# Patient Record
Sex: FEMALE | Race: WHITE | NOT HISPANIC OR LATINO | Employment: OTHER | URBAN - METROPOLITAN AREA
[De-identification: names, ages, dates, MRNs, and addresses within clinical notes are randomized per-mention and may not be internally consistent; named-entity substitution may affect disease eponyms.]

---

## 2023-03-04 PROBLEM — I10 BENIGN ESSENTIAL HYPERTENSION: Status: ACTIVE | Noted: 2020-12-02

## 2023-03-04 PROBLEM — E11.9 TYPE 2 DIABETES MELLITUS (MULTI): Status: ACTIVE | Noted: 2020-12-02

## 2023-03-04 PROBLEM — H04.129 TEAR FILM INSUFFICIENCY: Status: ACTIVE | Noted: 2020-12-02

## 2023-03-04 PROBLEM — E07.89 OTHER SPECIFIED DISORDERS OF THYROID: Status: ACTIVE | Noted: 2020-12-02

## 2023-03-04 PROBLEM — N39.41 URGE INCONTINENCE OF URINE: Status: ACTIVE | Noted: 2020-12-02

## 2023-03-04 PROBLEM — E66.9 OBESITY WITH BODY MASS INDEX 30 OR GREATER: Status: ACTIVE | Noted: 2023-03-04

## 2023-03-04 PROBLEM — E78.5 HYPERLIPIDEMIA: Status: ACTIVE | Noted: 2020-12-02

## 2023-03-04 PROBLEM — E55.9 VITAMIN D DEFICIENCY: Status: ACTIVE | Noted: 2020-12-02

## 2023-03-04 PROBLEM — E53.9 VITAMIN B-COMPLEX DEFICIENCY: Status: ACTIVE | Noted: 2020-12-02

## 2023-03-04 RX ORDER — INSULIN GLARGINE 100 [IU]/ML
32 INJECTION, SOLUTION SUBCUTANEOUS NIGHTLY
COMMUNITY
End: 2023-03-17 | Stop reason: ALTCHOICE

## 2023-03-04 RX ORDER — METFORMIN HYDROCHLORIDE 500 MG/1
2 TABLET, EXTENDED RELEASE ORAL 2 TIMES DAILY
COMMUNITY
Start: 2020-12-02 | End: 2023-03-17 | Stop reason: ALTCHOICE

## 2023-03-04 RX ORDER — MAGNESIUM 250 MG
TABLET ORAL
COMMUNITY
End: 2023-11-07 | Stop reason: WASHOUT

## 2023-03-04 RX ORDER — CYCLOSPORINE 0.5 MG/ML
EMULSION OPHTHALMIC
COMMUNITY
End: 2023-09-11

## 2023-03-04 RX ORDER — LEVOTHYROXINE SODIUM 100 UG/1
1 TABLET ORAL DAILY
COMMUNITY
End: 2023-03-17 | Stop reason: ALTCHOICE

## 2023-03-04 RX ORDER — BIOTIN 10 MG
TABLET ORAL
COMMUNITY
End: 2023-03-17 | Stop reason: ALTCHOICE

## 2023-03-04 RX ORDER — PRAVASTATIN SODIUM 20 MG/1
1 TABLET ORAL NIGHTLY
COMMUNITY
Start: 2021-08-23 | End: 2023-03-17

## 2023-03-04 RX ORDER — ATENOLOL 50 MG/1
1 TABLET ORAL DAILY
COMMUNITY
Start: 2020-12-02 | End: 2023-03-17 | Stop reason: ALTCHOICE

## 2023-03-04 RX ORDER — DULAGLUTIDE 1.5 MG/.5ML
INJECTION, SOLUTION SUBCUTANEOUS
COMMUNITY
End: 2023-03-17 | Stop reason: ALTCHOICE

## 2023-03-04 RX ORDER — CHOLECALCIFEROL (VITAMIN D3) 125 MCG
CAPSULE ORAL
COMMUNITY
End: 2023-03-17 | Stop reason: ALTCHOICE

## 2023-03-04 RX ORDER — BLOOD-GLUCOSE METER
EACH MISCELLANEOUS 2 TIMES DAILY
COMMUNITY
End: 2023-04-25 | Stop reason: ALTCHOICE

## 2023-03-15 ASSESSMENT — ENCOUNTER SYMPTOMS
NECK PAIN: 1
SWEATS: 0
BLURRED VISION: 0
HYPERTENSION: 1
PND: 0
ORTHOPNEA: 0
HEADACHES: 0
PALPITATIONS: 0
SHORTNESS OF BREATH: 0

## 2023-03-17 ENCOUNTER — OFFICE VISIT (OUTPATIENT)
Dept: PRIMARY CARE | Facility: CLINIC | Age: 68
End: 2023-03-17
Payer: MEDICARE

## 2023-03-17 ENCOUNTER — TELEPHONE (OUTPATIENT)
Dept: PRIMARY CARE | Facility: CLINIC | Age: 68
End: 2023-03-17

## 2023-03-17 VITALS
WEIGHT: 166 LBS | DIASTOLIC BLOOD PRESSURE: 90 MMHG | BODY MASS INDEX: 27.66 KG/M2 | OXYGEN SATURATION: 98 % | SYSTOLIC BLOOD PRESSURE: 150 MMHG | HEART RATE: 82 BPM | HEIGHT: 65 IN

## 2023-03-17 DIAGNOSIS — Z13.6 SCREENING FOR CARDIOVASCULAR CONDITION: ICD-10-CM

## 2023-03-17 DIAGNOSIS — R68.2 DRY MOUTH: ICD-10-CM

## 2023-03-17 DIAGNOSIS — R26.89 FUNCTIONAL GAIT ABNORMALITY: ICD-10-CM

## 2023-03-17 DIAGNOSIS — I10 BENIGN ESSENTIAL HYPERTENSION: ICD-10-CM

## 2023-03-17 DIAGNOSIS — M54.50 CHRONIC BILATERAL LOW BACK PAIN WITHOUT SCIATICA: ICD-10-CM

## 2023-03-17 DIAGNOSIS — R41.89 BRAIN FOG: ICD-10-CM

## 2023-03-17 DIAGNOSIS — G89.29 CHRONIC BILATERAL LOW BACK PAIN WITHOUT SCIATICA: ICD-10-CM

## 2023-03-17 DIAGNOSIS — Z78.0 POSTMENOPAUSAL STATUS (AGE-RELATED) (NATURAL): ICD-10-CM

## 2023-03-17 DIAGNOSIS — E53.8 VITAMIN B12 DEFICIENCY: ICD-10-CM

## 2023-03-17 DIAGNOSIS — E78.5 HYPERLIPIDEMIA, UNSPECIFIED HYPERLIPIDEMIA TYPE: ICD-10-CM

## 2023-03-17 DIAGNOSIS — Z00.00 ROUTINE GENERAL MEDICAL EXAMINATION AT HEALTH CARE FACILITY: ICD-10-CM

## 2023-03-17 DIAGNOSIS — E11.9 TYPE 2 DIABETES MELLITUS WITHOUT COMPLICATION, WITH LONG-TERM CURRENT USE OF INSULIN (MULTI): ICD-10-CM

## 2023-03-17 DIAGNOSIS — Z79.4 TYPE 2 DIABETES MELLITUS WITHOUT COMPLICATION, WITH LONG-TERM CURRENT USE OF INSULIN (MULTI): ICD-10-CM

## 2023-03-17 DIAGNOSIS — Z13.820 SCREENING FOR OSTEOPOROSIS: Primary | ICD-10-CM

## 2023-03-17 PROBLEM — N39.41 URGE INCONTINENCE OF URINE: Status: RESOLVED | Noted: 2020-12-02 | Resolved: 2023-03-17

## 2023-03-17 PROBLEM — E53.9 VITAMIN B-COMPLEX DEFICIENCY: Status: RESOLVED | Noted: 2020-12-02 | Resolved: 2023-03-17

## 2023-03-17 PROBLEM — E55.9 VITAMIN D DEFICIENCY: Chronic | Status: ACTIVE | Noted: 2020-12-02

## 2023-03-17 PROBLEM — E55.9 VITAMIN D DEFICIENCY: Chronic | Status: RESOLVED | Noted: 2020-12-02 | Resolved: 2023-03-17

## 2023-03-17 PROBLEM — H04.129 TEAR FILM INSUFFICIENCY: Status: RESOLVED | Noted: 2020-12-02 | Resolved: 2023-03-17

## 2023-03-17 PROBLEM — E07.89 OTHER SPECIFIED DISORDERS OF THYROID: Chronic | Status: ACTIVE | Noted: 2020-12-02

## 2023-03-17 PROBLEM — E66.9 OBESITY WITH BODY MASS INDEX 30 OR GREATER: Chronic | Status: ACTIVE | Noted: 2023-03-04

## 2023-03-17 PROCEDURE — G0402 INITIAL PREVENTIVE EXAM: HCPCS | Performed by: FAMILY MEDICINE

## 2023-03-17 PROCEDURE — 4010F ACE/ARB THERAPY RXD/TAKEN: CPT | Performed by: FAMILY MEDICINE

## 2023-03-17 PROCEDURE — 1036F TOBACCO NON-USER: CPT | Performed by: FAMILY MEDICINE

## 2023-03-17 PROCEDURE — 99214 OFFICE O/P EST MOD 30 MIN: CPT | Performed by: FAMILY MEDICINE

## 2023-03-17 PROCEDURE — 1159F MED LIST DOCD IN RCRD: CPT | Performed by: FAMILY MEDICINE

## 2023-03-17 PROCEDURE — 1170F FXNL STATUS ASSESSED: CPT | Performed by: FAMILY MEDICINE

## 2023-03-17 PROCEDURE — 3077F SYST BP >= 140 MM HG: CPT | Performed by: FAMILY MEDICINE

## 2023-03-17 PROCEDURE — 3080F DIAST BP >= 90 MM HG: CPT | Performed by: FAMILY MEDICINE

## 2023-03-17 PROCEDURE — 1160F RVW MEDS BY RX/DR IN RCRD: CPT | Performed by: FAMILY MEDICINE

## 2023-03-17 PROCEDURE — G0403 EKG FOR INITIAL PREVENT EXAM: HCPCS | Performed by: FAMILY MEDICINE

## 2023-03-17 RX ORDER — ROSUVASTATIN CALCIUM 10 MG/1
10 TABLET, COATED ORAL NIGHTLY
COMMUNITY
End: 2023-03-17 | Stop reason: SDUPTHER

## 2023-03-17 RX ORDER — LOSARTAN POTASSIUM 100 MG/1
100 TABLET ORAL DAILY
COMMUNITY
End: 2023-03-17

## 2023-03-17 RX ORDER — ASPIRIN 81 MG/1
1 TABLET ORAL DAILY
COMMUNITY
Start: 2021-12-16 | End: 2023-11-07 | Stop reason: ALTCHOICE

## 2023-03-17 RX ORDER — LOSARTAN POTASSIUM 50 MG/1
50 TABLET ORAL DAILY
Qty: 90 TABLET | Refills: 1 | Status: SHIPPED | OUTPATIENT
Start: 2023-03-17 | End: 2023-11-07 | Stop reason: SDUPTHER

## 2023-03-17 RX ORDER — BUTALB/ACETAMINOPHEN/CAFFEINE 50-325-40
1 TABLET ORAL DAILY
COMMUNITY
End: 2023-11-07 | Stop reason: ALTCHOICE

## 2023-03-17 RX ORDER — ROSUVASTATIN CALCIUM 10 MG/1
10 TABLET, COATED ORAL NIGHTLY
Qty: 90 TABLET | Refills: 0 | Status: SHIPPED | OUTPATIENT
Start: 2023-03-17 | End: 2023-11-07 | Stop reason: SDUPTHER

## 2023-03-17 RX ORDER — LEVOTHYROXINE SODIUM 88 UG/1
88 TABLET ORAL
COMMUNITY
End: 2023-09-11

## 2023-03-17 ASSESSMENT — ENCOUNTER SYMPTOMS
CONSTIPATION: 0
RHINORRHEA: 0
DIARRHEA: 0
BLURRED VISION: 0
SWEATS: 0
TREMORS: 0
FATIGUE: 0
ABDOMINAL PAIN: 0
HYPERTENSION: 1
SHORTNESS OF BREATH: 0
COUGH: 0
DIZZINESS: 0
VOMITING: 0
ORTHOPNEA: 0
PND: 0
NECK PAIN: 1
VOICE CHANGE: 0
WEAKNESS: 1
SLEEP DISTURBANCE: 0
DYSURIA: 0
FEVER: 0
SORE THROAT: 0
NAUSEA: 0
HEADACHES: 0
PALPITATIONS: 0
APPETITE CHANGE: 0
CHILLS: 0
SPEECH DIFFICULTY: 0

## 2023-03-17 ASSESSMENT — PATIENT HEALTH QUESTIONNAIRE - PHQ9
2. FEELING DOWN, DEPRESSED OR HOPELESS: NOT AT ALL
SUM OF ALL RESPONSES TO PHQ9 QUESTIONS 1 AND 2: 0
1. LITTLE INTEREST OR PLEASURE IN DOING THINGS: NOT AT ALL

## 2023-03-17 ASSESSMENT — ACTIVITIES OF DAILY LIVING (ADL)
DRESSING: INDEPENDENT
TAKING_MEDICATION: INDEPENDENT
MANAGING_FINANCES: INDEPENDENT
BATHING: INDEPENDENT
GROCERY_SHOPPING: INDEPENDENT
DOING_HOUSEWORK: INDEPENDENT

## 2023-03-17 NOTE — ASSESSMENT & PLAN NOTE
Brain MRI normal.  Cervical MRI shows arthritis.  Check lumbar x-ray.  Recommend follow-up with neurology.  
Continue rosuvastatin 10 mg.  
Management per endocrinology.  
Management per endocrinology.  
Possible long-term COVID. Due to changes in memory and mother's history of dementia, recommend evaluation by neuropsychology.   
Sjogren's testing ordered.  Patient requesting referral to ENT.  
Start losartan 50 mg.  Discussed that would like to avoid beta-blockers and would not recommend restarting atenolol.  
No

## 2023-03-17 NOTE — PROGRESS NOTES
Subjective   Reason for Visit: Юлия Ku is an 67 y.o. female here for a Medicare Wellness visit.     Past Medical, Surgical, and Family History reviewed and updated in chart.    Reviewed all medications by prescribing practitioner or clinical pharmacist (such as prescriptions, OTCs, herbal therapies and supplements) and documented in the medical record.    Юлия returns for follow-up after seeing another PCP.    Patient has been having issues with dry mouth.  Discussed this with previous PCP who did Sjogren's testing which was negative.  Patient states mouth always feels dry.  Is eating less due to dryness in mouth.  Is not having any pain in the salivary glands.  Has seen multiple dentists, but has not received any help with this problem.    Patient is also having issues with her gait.  Has had 3 falls in the last year.  Has issues with the gait shuffling.  Did see a neurologist.  MRI of the brain and cervical spine was done.  Results showed arthritis in the cervical spine spine.    Patient has been off atenolol.  Has been taking her mom's losartan intermittently.  Did not take any medication in the past few days.  Knows that her blood pressures have been running high and other doctors offices.    Also is having issues with brain fog.  Feels like her mind is not as clear.  This has been going on since COVID.  Her mom was diagnosed with dementia in her 70s.    Hypertension  This is a recurrent problem. The current episode started more than 1 year ago. The problem has been waxing and waning since onset. The problem is uncontrolled. Associated symptoms include malaise/fatigue and neck pain. Pertinent negatives include no anxiety, blurred vision, chest pain, headaches, orthopnea, palpitations, peripheral edema, PND, shortness of breath or sweats. Agents associated with hypertension include thyroid hormones. Risk factors for coronary artery disease include diabetes mellitus. There are no compliance problems.   "      Patient Care Team:  Bibiana Dupree DO as PCP - General  Yovany Grigsby MD as Referring Physician (Neurology)  Yosef Mccormick MD as Referring Physician (Endocrinology)     Review of Systems   Constitutional:  Positive for malaise/fatigue. Negative for appetite change, chills, fatigue and fever.   HENT:  Negative for congestion, rhinorrhea, sore throat and voice change.    Eyes:  Negative for blurred vision and visual disturbance.   Respiratory:  Negative for cough and shortness of breath.    Cardiovascular:  Negative for chest pain, palpitations, orthopnea and PND.   Gastrointestinal:  Negative for abdominal pain, constipation, diarrhea, nausea and vomiting.   Genitourinary:  Negative for dysuria and urgency.   Musculoskeletal:  Positive for neck pain.   Skin:  Negative for rash.   Neurological:  Positive for weakness. Negative for dizziness, tremors, speech difficulty and headaches.   Psychiatric/Behavioral:  Negative for sleep disturbance.        Objective   Vitals:  /90   Pulse 82   Ht 1.651 m (5' 5\")   Wt 75.3 kg (166 lb)   SpO2 98%   BMI 27.62 kg/m²       Physical Exam  Constitutional:       General: She is not in acute distress.     Appearance: Normal appearance.   HENT:      Head: Normocephalic.      Nose: No congestion.      Mouth/Throat:      Mouth: Mucous membranes are moist.   Eyes:      Extraocular Movements: Extraocular movements intact.      Conjunctiva/sclera: Conjunctivae normal.   Cardiovascular:      Rate and Rhythm: Normal rate and regular rhythm.      Heart sounds: No murmur heard.  Pulmonary:      Effort: Pulmonary effort is normal.      Breath sounds: No wheezing or rhonchi.   Abdominal:      Palpations: Abdomen is soft.      Tenderness: There is no abdominal tenderness.   Musculoskeletal:         General: No swelling or tenderness.      Right lower leg: No edema.      Left lower leg: No edema.   Skin:     General: Skin is warm and dry.   Neurological:      " General: No focal deficit present.      Mental Status: She is alert.      Motor: No weakness.      Coordination: Coordination normal.      Gait: Gait normal.      Deep Tendon Reflexes: Reflexes normal.   Psychiatric:         Mood and Affect: Mood normal.         Behavior: Behavior normal.         Assessment/Plan   Problem List Items Addressed This Visit          Circulatory    Benign essential hypertension (Chronic)    Current Assessment & Plan     Start losartan 50 mg.  Discussed that would like to avoid beta-blockers and would not recommend restarting atenolol.         Relevant Medications    losartan (Cozaar) 50 mg tablet    Other Relevant Orders    Comprehensive Metabolic Panel       Endocrine/Metabolic    Type 2 diabetes mellitus (CMS/HCC) (Chronic)    Current Assessment & Plan     Management per endocrinology.            Other    Hyperlipidemia (Chronic)    Current Assessment & Plan     Continue rosuvastatin 10 mg.         Relevant Medications    rosuvastatin (Crestor) 10 mg tablet    Other Relevant Orders    Lipid Panel    Comprehensive Metabolic Panel    Dry mouth    Current Assessment & Plan     Sjogren's testing ordered.  Patient requesting referral to ENT.         Relevant Orders    Anti-SSA    Anti-SSB    Referral to ENT    Functional gait abnormality    Current Assessment & Plan     Brain MRI normal.  Cervical MRI shows arthritis.  Check lumbar x-ray.  Recommend follow-up with neurology.         Relevant Orders    XR lumbar spine 2-3 views    Brain fog    Current Assessment & Plan     Possible long-term COVID. Due to changes in memory and mother's history of dementia, recommend evaluation by neuropsychology.          Relevant Orders    Referral to Neuropsychology     Other Visit Diagnoses       Screening for osteoporosis    -  Primary    Relevant Orders    XR DEXA bone density    Postmenopausal status (age-related) (natural)        Relevant Orders    XR DEXA bone density    Routine general medical  examination at health care facility        Screening for cardiovascular condition        Relevant Orders    ECG 12 lead (Completed)    Chronic bilateral low back pain without sciatica        Relevant Orders    XR lumbar spine 2-3 views    Vitamin B12 deficiency        Relevant Orders    Vitamin B12

## 2023-03-17 NOTE — PROGRESS NOTES
Answers submitted by the patient for this visit:  High Blood Pressure Questionnaire (Submitted on 3/15/2023)  Chief Complaint: Hypertension  Chronicity: recurrent  Onset: more than 1 year ago  Progression since onset: waxing and waning  Condition status: uncontrolled  anxiety: No  blurred vision: No  chest pain: No  headaches: No  malaise/fatigue: Yes  neck pain: Yes  orthopnea: No  palpitations: No  peripheral edema: No  PND: No  shortness of breath: No  sweats: No  Agents associated with hypertension: thyroid hormones  CAD risks: diabetes mellitus  Compliance problems: no compliance problems

## 2023-03-17 NOTE — TELEPHONE ENCOUNTER
Receieved a call from Lizbeth from [facility] they received Referral for ENT Dr. Encarnacion for Dry Mouth-- they do not handle that and also the doctor states  should be handled by a dentist.

## 2023-03-20 ENCOUNTER — APPOINTMENT (OUTPATIENT)
Dept: PRIMARY CARE | Facility: CLINIC | Age: 68
End: 2023-03-20
Payer: MEDICARE

## 2023-03-21 ENCOUNTER — APPOINTMENT (OUTPATIENT)
Dept: PRIMARY CARE | Facility: CLINIC | Age: 68
End: 2023-03-21
Payer: MEDICARE

## 2023-04-03 ENCOUNTER — LAB (OUTPATIENT)
Dept: LAB | Facility: LAB | Age: 68
End: 2023-04-03
Payer: MEDICARE

## 2023-04-03 DIAGNOSIS — R68.2 DRY MOUTH: ICD-10-CM

## 2023-04-03 DIAGNOSIS — E53.8 VITAMIN B12 DEFICIENCY: ICD-10-CM

## 2023-04-03 DIAGNOSIS — I10 BENIGN ESSENTIAL HYPERTENSION: ICD-10-CM

## 2023-04-03 DIAGNOSIS — E78.5 HYPERLIPIDEMIA, UNSPECIFIED HYPERLIPIDEMIA TYPE: ICD-10-CM

## 2023-04-03 LAB
ALANINE AMINOTRANSFERASE (SGPT) (U/L) IN SER/PLAS: 11 U/L (ref 7–45)
ALBUMIN (G/DL) IN SER/PLAS: 4.6 G/DL (ref 3.4–5)
ALKALINE PHOSPHATASE (U/L) IN SER/PLAS: 43 U/L (ref 33–136)
ANION GAP IN SER/PLAS: 11 MMOL/L (ref 10–20)
ANTI-SSA: <0.2 AI
ANTI-SSB: <0.2 AI
ASPARTATE AMINOTRANSFERASE (SGOT) (U/L) IN SER/PLAS: 13 U/L (ref 9–39)
BILIRUBIN TOTAL (MG/DL) IN SER/PLAS: 0.8 MG/DL (ref 0–1.2)
CALCIUM (MG/DL) IN SER/PLAS: 9.6 MG/DL (ref 8.6–10.3)
CARBON DIOXIDE, TOTAL (MMOL/L) IN SER/PLAS: 29 MMOL/L (ref 21–32)
CHLORIDE (MMOL/L) IN SER/PLAS: 103 MMOL/L (ref 98–107)
CHOLESTEROL (MG/DL) IN SER/PLAS: 126 MG/DL (ref 0–199)
CHOLESTEROL IN HDL (MG/DL) IN SER/PLAS: 61.3 MG/DL
CHOLESTEROL/HDL RATIO: 2.1
COBALAMIN (VITAMIN B12) (PG/ML) IN SER/PLAS: 810 PG/ML (ref 211–911)
CREATININE (MG/DL) IN SER/PLAS: 0.76 MG/DL (ref 0.5–1.05)
GFR FEMALE: 86 ML/MIN/1.73M2
GLUCOSE (MG/DL) IN SER/PLAS: 102 MG/DL (ref 74–99)
LDL: 47 MG/DL (ref 0–99)
POTASSIUM (MMOL/L) IN SER/PLAS: 4.3 MMOL/L (ref 3.5–5.3)
PROTEIN TOTAL: 6.6 G/DL (ref 6.4–8.2)
SODIUM (MMOL/L) IN SER/PLAS: 139 MMOL/L (ref 136–145)
TRIGLYCERIDE (MG/DL) IN SER/PLAS: 88 MG/DL (ref 0–149)
UREA NITROGEN (MG/DL) IN SER/PLAS: 13 MG/DL (ref 6–23)
VLDL: 18 MG/DL (ref 0–40)

## 2023-04-03 PROCEDURE — 80061 LIPID PANEL: CPT

## 2023-04-03 PROCEDURE — 36415 COLL VENOUS BLD VENIPUNCTURE: CPT

## 2023-04-03 PROCEDURE — 86235 NUCLEAR ANTIGEN ANTIBODY: CPT

## 2023-04-03 PROCEDURE — 80053 COMPREHEN METABOLIC PANEL: CPT

## 2023-04-03 PROCEDURE — 82607 VITAMIN B-12: CPT

## 2023-04-25 ENCOUNTER — OFFICE VISIT (OUTPATIENT)
Dept: PRIMARY CARE | Facility: CLINIC | Age: 68
End: 2023-04-25
Payer: MEDICARE

## 2023-04-25 VITALS
OXYGEN SATURATION: 95 % | BODY MASS INDEX: 27.46 KG/M2 | HEART RATE: 99 BPM | DIASTOLIC BLOOD PRESSURE: 72 MMHG | SYSTOLIC BLOOD PRESSURE: 126 MMHG | TEMPERATURE: 97.5 F | WEIGHT: 165 LBS

## 2023-04-25 DIAGNOSIS — R68.2 DRY MOUTH: Primary | ICD-10-CM

## 2023-04-25 DIAGNOSIS — Z11.59 ENCOUNTER FOR HEPATITIS C SCREENING TEST FOR LOW RISK PATIENT: ICD-10-CM

## 2023-04-25 DIAGNOSIS — Z79.4 TYPE 2 DIABETES MELLITUS WITHOUT COMPLICATION, WITH LONG-TERM CURRENT USE OF INSULIN (MULTI): Chronic | ICD-10-CM

## 2023-04-25 DIAGNOSIS — Z11.4 SCREENING FOR HIV (HUMAN IMMUNODEFICIENCY VIRUS): ICD-10-CM

## 2023-04-25 DIAGNOSIS — E11.9 TYPE 2 DIABETES MELLITUS WITHOUT COMPLICATION, WITH LONG-TERM CURRENT USE OF INSULIN (MULTI): Chronic | ICD-10-CM

## 2023-04-25 DIAGNOSIS — I10 BENIGN ESSENTIAL HYPERTENSION: Chronic | ICD-10-CM

## 2023-04-25 DIAGNOSIS — R20.8 BURNING SENSATION OF MOUTH: ICD-10-CM

## 2023-04-25 PROCEDURE — 3074F SYST BP LT 130 MM HG: CPT | Performed by: FAMILY MEDICINE

## 2023-04-25 PROCEDURE — 1160F RVW MEDS BY RX/DR IN RCRD: CPT | Performed by: FAMILY MEDICINE

## 2023-04-25 PROCEDURE — 99214 OFFICE O/P EST MOD 30 MIN: CPT | Performed by: FAMILY MEDICINE

## 2023-04-25 PROCEDURE — 1159F MED LIST DOCD IN RCRD: CPT | Performed by: FAMILY MEDICINE

## 2023-04-25 PROCEDURE — 1036F TOBACCO NON-USER: CPT | Performed by: FAMILY MEDICINE

## 2023-04-25 PROCEDURE — 3078F DIAST BP <80 MM HG: CPT | Performed by: FAMILY MEDICINE

## 2023-04-25 PROCEDURE — 4010F ACE/ARB THERAPY RXD/TAKEN: CPT | Performed by: FAMILY MEDICINE

## 2023-04-25 RX ORDER — INSULIN GLARGINE 100 [IU]/ML
20 INJECTION, SOLUTION SUBCUTANEOUS DAILY
COMMUNITY
End: 2023-10-26 | Stop reason: WASHOUT

## 2023-04-25 RX ORDER — METFORMIN HYDROCHLORIDE 500 MG/1
500 TABLET, EXTENDED RELEASE ORAL
COMMUNITY
Start: 2022-10-04

## 2023-04-25 RX ORDER — MAGNESIUM GLUCONATE 27.5 (500)
250 TABLET ORAL DAILY
COMMUNITY
End: 2023-11-07 | Stop reason: WASHOUT

## 2023-04-25 RX ORDER — NYSTATIN 100000 [USP'U]/ML
500000 SUSPENSION ORAL 4 TIMES DAILY
Qty: 280 ML | Refills: 0 | Status: SHIPPED | OUTPATIENT
Start: 2023-04-25 | End: 2023-10-26 | Stop reason: WASHOUT

## 2023-04-25 ASSESSMENT — ENCOUNTER SYMPTOMS
COUGH: 0
VOMITING: 0
CHILLS: 0
SORE THROAT: 0
DIARRHEA: 0
NAUSEA: 0
FEVER: 0
TROUBLE SWALLOWING: 0
SHORTNESS OF BREATH: 0
DYSURIA: 0
ABDOMINAL PAIN: 0

## 2023-04-25 ASSESSMENT — PATIENT HEALTH QUESTIONNAIRE - PHQ9
SUM OF ALL RESPONSES TO PHQ9 QUESTIONS 1 AND 2: 0
1. LITTLE INTEREST OR PLEASURE IN DOING THINGS: NOT AT ALL
2. FEELING DOWN, DEPRESSED OR HOPELESS: NOT AT ALL

## 2023-04-25 NOTE — PROGRESS NOTES
Subjective   Patient ID: Юлия Ku is a 68 y.o. female who presents for Dry Mouth (X 8 months   Discuss autoimmune testing).    Юлия is here to discuss her dry mouth.  This is a constant, chronic problem.  Has been using lozenges and other dry mouth products, but she is not having any improvement.  Also has burning in mouth. Sometimes foods taste different than usual. Labs done. Here to review.     She is worried about myasthenia gravis as her father was diagnosed with it..  Reports that she does have double vision occasionally. Happens after take out contacts. Does not happen when contacts in.               Review of Systems   Constitutional:  Negative for chills and fever.   HENT:  Negative for mouth sores, sore throat and trouble swallowing.    Respiratory:  Negative for cough and shortness of breath.    Cardiovascular:  Negative for chest pain.   Gastrointestinal:  Negative for abdominal pain, diarrhea, nausea and vomiting.   Genitourinary:  Negative for dysuria.       Objective   /72   Pulse 99   Temp 36.4 °C (97.5 °F)   Wt 74.8 kg (165 lb)   SpO2 95%   BMI 27.46 kg/m²     Physical Exam  Constitutional:       General: She is not in acute distress.  HENT:      Head: Normocephalic and atraumatic.      Comments: White tongue  Eyes:      Extraocular Movements: Extraocular movements intact.   Pulmonary:      Effort: Pulmonary effort is normal.   Neurological:      Mental Status: She is alert.   Psychiatric:         Mood and Affect: Mood normal.         Assessment/Plan   Diagnoses and all orders for this visit:  Dry mouth  Comments:  Referred to rheumatology. Labs ordered to further evaluate. Possibly stomatitis will try nystatin treatment to see if improves symptoms.  Orders:  -     Referral to Rheumatology; Future  -     Acetylcholine Receptor Binding Antibody; Future  -     MONALISA; Future  -     Hepatitis C Antibody; Future  -     CBC and Auto Differential; Future  Burning sensation of mouth  -      nystatin (Mycostatin) 100,000 unit/mL suspension; Take 5 mL (500,000 Units) by mouth in the morning and 5 mL (500,000 Units) at noon and 5 mL (500,000 Units) in the evening and 5 mL (500,000 Units) before bedtime. Swish in mouth and spit out..  Screening for HIV (human immunodeficiency virus)  Encounter for hepatitis C screening test for low risk patient  -     Hepatitis C Antibody; Future  Type 2 diabetes mellitus without complication, with long-term current use of insulin (CMS/HCC)  -     CBC and Auto Differential; Future  Benign essential hypertension  -     CBC and Auto Differential; Future

## 2023-04-25 NOTE — ASSESSMENT & PLAN NOTE
Negative Sjogren's antibodies 2023. Referred to rheumatology. Labs ordered to further evaluate. Possibly stomatitis will try nystatin treatment to see if improves symptoms.

## 2023-04-27 ENCOUNTER — LAB (OUTPATIENT)
Dept: LAB | Facility: LAB | Age: 68
End: 2023-04-27
Payer: MEDICARE

## 2023-04-27 DIAGNOSIS — I10 BENIGN ESSENTIAL HYPERTENSION: Chronic | ICD-10-CM

## 2023-04-27 DIAGNOSIS — Z11.59 ENCOUNTER FOR HEPATITIS C SCREENING TEST FOR LOW RISK PATIENT: ICD-10-CM

## 2023-04-27 DIAGNOSIS — R68.2 DRY MOUTH: ICD-10-CM

## 2023-04-27 DIAGNOSIS — Z79.4 TYPE 2 DIABETES MELLITUS WITHOUT COMPLICATION, WITH LONG-TERM CURRENT USE OF INSULIN (MULTI): Chronic | ICD-10-CM

## 2023-04-27 DIAGNOSIS — E11.9 TYPE 2 DIABETES MELLITUS WITHOUT COMPLICATION, WITH LONG-TERM CURRENT USE OF INSULIN (MULTI): Chronic | ICD-10-CM

## 2023-04-27 LAB
BASOPHILS (10*3/UL) IN BLOOD BY AUTOMATED COUNT: 0.04 X10E9/L (ref 0–0.1)
BASOPHILS/100 LEUKOCYTES IN BLOOD BY AUTOMATED COUNT: 0.7 % (ref 0–2)
EOSINOPHILS (10*3/UL) IN BLOOD BY AUTOMATED COUNT: 0.07 X10E9/L (ref 0–0.7)
EOSINOPHILS/100 LEUKOCYTES IN BLOOD BY AUTOMATED COUNT: 1.3 % (ref 0–6)
ERYTHROCYTE DISTRIBUTION WIDTH (RATIO) BY AUTOMATED COUNT: 15.2 % (ref 11.5–14.5)
ERYTHROCYTE MEAN CORPUSCULAR HEMOGLOBIN CONCENTRATION (G/DL) BY AUTOMATED: 32.2 G/DL (ref 32–36)
ERYTHROCYTE MEAN CORPUSCULAR VOLUME (FL) BY AUTOMATED COUNT: 91 FL (ref 80–100)
ERYTHROCYTES (10*6/UL) IN BLOOD BY AUTOMATED COUNT: 4.67 X10E12/L (ref 4–5.2)
HEMATOCRIT (%) IN BLOOD BY AUTOMATED COUNT: 42.6 % (ref 36–46)
HEMOGLOBIN (G/DL) IN BLOOD: 13.7 G/DL (ref 12–16)
IMMATURE GRANULOCYTES/100 LEUKOCYTES IN BLOOD BY AUTOMATED COUNT: 0.2 % (ref 0–0.9)
LEUKOCYTES (10*3/UL) IN BLOOD BY AUTOMATED COUNT: 5.5 X10E9/L (ref 4.4–11.3)
LYMPHOCYTES (10*3/UL) IN BLOOD BY AUTOMATED COUNT: 1.55 X10E9/L (ref 1.2–4.8)
LYMPHOCYTES/100 LEUKOCYTES IN BLOOD BY AUTOMATED COUNT: 28.3 % (ref 13–44)
MONOCYTES (10*3/UL) IN BLOOD BY AUTOMATED COUNT: 0.4 X10E9/L (ref 0.1–1)
MONOCYTES/100 LEUKOCYTES IN BLOOD BY AUTOMATED COUNT: 7.3 % (ref 2–10)
NEUTROPHILS (10*3/UL) IN BLOOD BY AUTOMATED COUNT: 3.41 X10E9/L (ref 1.2–7.7)
NEUTROPHILS/100 LEUKOCYTES IN BLOOD BY AUTOMATED COUNT: 62.2 % (ref 40–80)
PLATELETS (10*3/UL) IN BLOOD AUTOMATED COUNT: 265 X10E9/L (ref 150–450)

## 2023-04-27 PROCEDURE — 86038 ANTINUCLEAR ANTIBODIES: CPT

## 2023-04-27 PROCEDURE — 85025 COMPLETE CBC W/AUTO DIFF WBC: CPT

## 2023-04-27 PROCEDURE — 36415 COLL VENOUS BLD VENIPUNCTURE: CPT

## 2023-04-27 PROCEDURE — 83519 RIA NONANTIBODY: CPT

## 2023-04-27 PROCEDURE — 86803 HEPATITIS C AB TEST: CPT

## 2023-04-28 LAB
ANTI-NUCLEAR ANTIBODY (ANA): NEGATIVE
HEPATITIS C VIRUS AB PRESENCE IN SERUM: NONREACTIVE

## 2023-05-01 ENCOUNTER — TELEPHONE (OUTPATIENT)
Dept: PRIMARY CARE | Facility: CLINIC | Age: 68
End: 2023-05-01

## 2023-05-01 ENCOUNTER — APPOINTMENT (OUTPATIENT)
Dept: PRIMARY CARE | Facility: CLINIC | Age: 68
End: 2023-05-01
Payer: MEDICARE

## 2023-05-01 LAB — ACHR BINDING AB, SERUM: 0 NMOL/L (ref 0–0.4)

## 2023-05-01 NOTE — TELEPHONE ENCOUNTER
----- Message from Bibiana Dupree DO sent at 5/1/2023  8:27 AM EDT -----  Please let patient know that all of her labs were normal including blood counts, screen for autoimmune disease and screening for hepatitis C. Recommend see rheumatologist as planned.

## 2023-05-02 ENCOUNTER — PATIENT MESSAGE (OUTPATIENT)
Dept: PRIMARY CARE | Facility: CLINIC | Age: 68
End: 2023-05-02
Payer: MEDICARE

## 2023-06-02 LAB
ANTICARDIOLIPIN IGA ANTIBODY: <0.5 APL U/ML (ref 0–20)
ANTICARDIOLIPIN IGG ANTIBODY: <1.6 GPL U/ML (ref 0–20)
ANTICARDIOLIPIN IGM ANTIBODY: 0.7 MPL U/ML (ref 0–20)
BETA 2 GLYCOPROTEIN 1 IGA AB IN SERUM: <0.6 U/ML (ref 0–20)
BETA 2 GLYCOPROTEIN 1 IGG AB IN SERUM: <1.4 U/ML (ref 0–20)
BETA 2 GLYCOPROTEIN 1 IGM ANTIBODY IN SERUM: 0.7 U/ML (ref 0–20)
IGG (MG/DL) IN SER/PLAS: 469 MG/DL (ref 700–1600)
IGG SUBCLASS 1 (MG/DL) IN SERUM: 372 MG/DL (ref 490–1140)
IGG SUBCLASS 2 (MG/DL) IN SERUM: 105 MG/DL (ref 150–640)
IGG SUBCLASS 3 (MG/DL) IN SERUM: 19 MG/DL (ref 11–85)
IGG SUBCLASS 4 (MG/DL) IN SERUM: 46 MG/DL (ref 3–200)
THYROPEROXIDASE AB (IU/ML) IN SER/PLAS: <28 IU/ML
TISSUE TRANSGLUTAMINASE, IGA: <1 U/ML (ref 0–14)

## 2023-06-04 LAB
ALLERGEN ANIMAL: CAT DANDER IGE (KU/L): 0.59 KU/L
ALLERGEN ANIMAL: DOG DANDER IGE (KU/L): 0.11 KU/L
ALLERGEN GRASS: BERMUDA GRASS (CYNODON DACTYLON) IGE (KU/L): <0.1 KU/L
ALLERGEN GRASS: JOHNSON GRASS (SORGHUM HALEPENSE) IGE (KU/L): <0.1 KU/L
ALLERGEN GRASS: MEADOW GRASS, KENTUCKY BLUE (POA PRATENSIS )IGE (KU/L): 0.12 KU/L
ALLERGEN GRASS: TIMOTHY GRASS (PHLEUM PRATENSE) IGE (KU/L): <0.1 KU/L
ALLERGEN INSECT: COCKROACH IGE: <0.1 KU/L
ALLERGEN MICROORGANISM: ALTERNARIA ALTERNATA IGE (KU/L): <0.1 KU/L
ALLERGEN MICROORGANISM: ASPERGILLUS FUMIGATUS IGE (KU/L): <0.1 KU/L
ALLERGEN MICROORGANISM: CLADOSPORIUM HERBARUM IGE (KU/L): <0.1 KU/L
ALLERGEN MICROORGANISM: PENICILLIUM CHRYSOGENUM (P. NOTATUM) IGE (KU/L): <0.1 KU/L
ALLERGEN MITE: DERMATOPHAGOIDES FARINAE (HOUSE DUST MITE) IGE (KU/L): 1.5 KU/L
ALLERGEN MITE: DERMATOPHAGOIDES PTERONYSSINUS (HOUSE DUST MITE) IGE (KU/L): 0.92 KU/L
ALLERGEN TREE: BOX-ELDER (ACER NEGUNDO) IGE (KU/L): 0.11 KU/L
ALLERGEN TREE: COMMON SILVER BIRCH (BETULA VERRUCOSA) IGE (KU/L): <0.1 KU/L
ALLERGEN TREE: COTTONWOOD (POPULUS DELTOIDES) IGE (KU/L): <0.1 KU/L
ALLERGEN TREE: ELM (ULMUS AMERICANA) IGE (KU/L): <0.1 KU/L
ALLERGEN TREE: MAPLE LEAF SYCAMORE, LONDON PLANE IGE (KU/L): <0.1 KU/L
ALLERGEN TREE: MOUNTAIN JUNIPER (JUNIPERUS SABINOIDES) IGE (KU/L): <0.1 KU/L
ALLERGEN TREE: MULBERRY (MORUS ALBA) IGE (KU/L): <0.1 KU/L
ALLERGEN TREE: OAK (QUERCUS ALBA) IGE (KU/L): <0.1 KU/L
ALLERGEN TREE: PECAN, HICKORY (CARYA PECAN) IGE (KU/L): <0.1 KU/L
ALLERGEN TREE: WALNUT IGE: <0.1 KU/L
ALLERGEN TREE: WHITE ASH (FRAXINUS AMERICANA) IGE (KU/L): 0.13 KU/L
ALLERGEN WEED: COMMON PIGWEED (AMARANTHUS RETROFLEXUS) IGE (KU/L): <0.1 KU/L
ALLERGEN WEED: COMMON RAGWEED (AMB. ARTEMISIIFOLIA/A. ELATIOR) IGE (KU/L): <0.1 KU/L
ALLERGEN WEED: GOOSEFOOT, LAMB'S QUARTERS (CHENOPODIUM ALBUM) IGE (KU/L): <0.1 KU/L
ALLERGEN WEED: PLANTAIN (ENGLISH), RIBWORT (PLANTAGO LANCEOLATA) IGE (KU/L): <0.1 KU/L
ALLERGEN WEED: PRICKLY SALTWORT/RUSSIAN THISTLE (SALSOLA KALI) IGE (KU/L): <0.1 KU/L
ALLERGEN WEED: SHEEP SORREL (RUMEX ACETOSELLA) IGE (KU/L): <0.1 KU/L
IMMUNOCAP IGE: 44.4 KU/L (ref 0–214)
IMMUNOCAP INTERPRETATION: ABNORMAL

## 2023-06-05 ENCOUNTER — PATIENT MESSAGE (OUTPATIENT)
Dept: PRIMARY CARE | Facility: CLINIC | Age: 68
End: 2023-06-05
Payer: MEDICARE

## 2023-06-05 DIAGNOSIS — E07.89 OTHER SPECIFIED DISORDERS OF THYROID: Primary | Chronic | ICD-10-CM

## 2023-06-06 LAB
THYROGLOBULIN AB (IU/ML) IN SER/PLAS: <0.9 IU/ML (ref 0–4)
THYROGLOBULIN LC-MS/MS: NORMAL NG/ML (ref 1.3–31.8)
THYROGLOBULIN: 1.3 NG/ML (ref 1.3–31.8)

## 2023-06-09 LAB
CRYOGLOBULIN, QUALITATIVE: NORMAL
TRYPTASE: 2.4 MCG/L

## 2023-07-11 ASSESSMENT — ENCOUNTER SYMPTOMS
VISUAL CHANGE: 0
SLURRED SPEECH: 0
LOSS OF BALANCE: 1
FOCAL SENSORY LOSS: 0
HEADACHES: 0
VERTIGO: 0
PALPITATIONS: 0
MEMORY LOSS: 0
ABDOMINAL PAIN: 0
BOWEL INCONTINENCE: 0
AURA: 0
DIAPHORESIS: 0
DIZZINESS: 1
LIGHT-HEADEDNESS: 1
VOMITING: 0
FOCAL WEAKNESS: 1
ALTERED MENTAL STATUS: 0
SHORTNESS OF BREATH: 0
CONFUSION: 0
FATIGUE: 1
BACK PAIN: 0
NECK PAIN: 0
WEAKNESS: 1
FEVER: 0
NAUSEA: 0
NEUROLOGIC COMPLAINT: 1
CLUMSINESS: 1
NEAR-SYNCOPE: 0

## 2023-07-15 LAB — B. BURGDORFERI VLSE1/PEPC10 ABS, ELISA: 0.32 IV

## 2023-07-18 ENCOUNTER — OFFICE VISIT (OUTPATIENT)
Dept: PRIMARY CARE | Facility: CLINIC | Age: 68
End: 2023-07-18
Payer: MEDICARE

## 2023-07-18 VITALS
OXYGEN SATURATION: 96 % | DIASTOLIC BLOOD PRESSURE: 74 MMHG | BODY MASS INDEX: 27.46 KG/M2 | HEART RATE: 63 BPM | TEMPERATURE: 97 F | WEIGHT: 165 LBS | SYSTOLIC BLOOD PRESSURE: 122 MMHG

## 2023-07-18 DIAGNOSIS — Z79.4 TYPE 2 DIABETES MELLITUS WITHOUT COMPLICATION, WITH LONG-TERM CURRENT USE OF INSULIN (MULTI): Chronic | ICD-10-CM

## 2023-07-18 DIAGNOSIS — E78.5 HYPERLIPIDEMIA, UNSPECIFIED HYPERLIPIDEMIA TYPE: Chronic | ICD-10-CM

## 2023-07-18 DIAGNOSIS — E11.9 TYPE 2 DIABETES MELLITUS WITHOUT COMPLICATION, WITH LONG-TERM CURRENT USE OF INSULIN (MULTI): Chronic | ICD-10-CM

## 2023-07-18 DIAGNOSIS — R68.2 DRY MOUTH: ICD-10-CM

## 2023-07-18 DIAGNOSIS — R26.89 FUNCTIONAL GAIT ABNORMALITY: ICD-10-CM

## 2023-07-18 DIAGNOSIS — E89.0 POSTABLATIVE HYPOTHYROIDISM: Chronic | ICD-10-CM

## 2023-07-18 DIAGNOSIS — I10 BENIGN ESSENTIAL HYPERTENSION: Chronic | ICD-10-CM

## 2023-07-18 DIAGNOSIS — R41.89 BRAIN FOG: Primary | ICD-10-CM

## 2023-07-18 PROBLEM — E66.9 OBESITY WITH BODY MASS INDEX 30 OR GREATER: Chronic | Status: RESOLVED | Noted: 2023-03-04 | Resolved: 2023-07-18

## 2023-07-18 PROBLEM — I25.10 ARTERIOSCLEROSIS OF CORONARY ARTERY: Status: ACTIVE | Noted: 2022-06-27

## 2023-07-18 PROCEDURE — 1036F TOBACCO NON-USER: CPT | Performed by: FAMILY MEDICINE

## 2023-07-18 PROCEDURE — 4010F ACE/ARB THERAPY RXD/TAKEN: CPT | Performed by: FAMILY MEDICINE

## 2023-07-18 PROCEDURE — 1159F MED LIST DOCD IN RCRD: CPT | Performed by: FAMILY MEDICINE

## 2023-07-18 PROCEDURE — 1160F RVW MEDS BY RX/DR IN RCRD: CPT | Performed by: FAMILY MEDICINE

## 2023-07-18 PROCEDURE — 99214 OFFICE O/P EST MOD 30 MIN: CPT | Performed by: FAMILY MEDICINE

## 2023-07-18 PROCEDURE — 3074F SYST BP LT 130 MM HG: CPT | Performed by: FAMILY MEDICINE

## 2023-07-18 PROCEDURE — 3078F DIAST BP <80 MM HG: CPT | Performed by: FAMILY MEDICINE

## 2023-07-18 RX ORDER — MONTELUKAST SODIUM 10 MG/1
1 TABLET ORAL NIGHTLY
Qty: 30 TABLET | Refills: 5 | COMMUNITY
Start: 2023-06-01 | End: 2023-10-26 | Stop reason: WASHOUT

## 2023-07-18 ASSESSMENT — ENCOUNTER SYMPTOMS
PALPITATIONS: 0
NECK PAIN: 0
FOCAL WEAKNESS: 1
BOWEL INCONTINENCE: 0
VERTIGO: 0
NEAR-SYNCOPE: 0
LOSS OF BALANCE: 1
MEMORY LOSS: 0
FATIGUE: 1
WEAKNESS: 1
HEADACHES: 0
NEUROLOGIC COMPLAINT: 1
VISUAL CHANGE: 0
VOMITING: 0
CONFUSION: 0
CLUMSINESS: 1
ALTERED MENTAL STATUS: 0
ABDOMINAL PAIN: 0
FEVER: 0
SHORTNESS OF BREATH: 0
DIAPHORESIS: 0
FOCAL SENSORY LOSS: 0
SLURRED SPEECH: 0
DIZZINESS: 1
NAUSEA: 0
BACK PAIN: 0
AURA: 0
LIGHT-HEADEDNESS: 1

## 2023-07-18 NOTE — PROGRESS NOTES
Subjective   Patient ID: Юлия Ku is a 68 y.o. female who presents for Hyperlipidemia, Hypertension, and Diabetes (Recheck. Review labs.).    Юлия presents to discuss multiple issues.     Her main concerns are brain fog and weakness.     Did see endocrinologist. Was told to take 88 mcg levothyroxine consistently then recheck labs. Patient has not been taking the levothyroine 88 mcg because she states that she does not feel right taking it. Has been taking old rx instead. Is concerned that her brain fog could be due to long-covid.    Saw neurology regarding weakness. Plan to continue PT.     Went to ENT regarding dry mouth. Labs negative. Referred to oral surgeon to discuss mouth biopsy, but patient did not schedule because lost phone number.     Acute Neurological Problem  The patient's primary symptoms include clumsiness, focal weakness, a loss of balance and weakness. The patient's pertinent negatives include no altered mental status, focal sensory loss, memory loss, near-syncope, slurred speech, syncope or visual change. This is a chronic problem. The current episode started more than 1 year ago. The neurological problem developed gradually. The problem has been waxing and waning since onset. There was lower extremity focality noted. Associated symptoms include dizziness, fatigue and light-headedness. Pertinent negatives include no abdominal pain, auditory change, aura, back pain, bladder incontinence, bowel incontinence, chest pain, confusion, diaphoresis, fever, headaches, nausea, neck pain, palpitations, shortness of breath, vertigo or vomiting. Past treatments include bed rest and walking. The treatment provided no relief.        Review of Systems   Constitutional:  Positive for fatigue. Negative for diaphoresis and fever.   Respiratory:  Negative for shortness of breath.    Cardiovascular:  Negative for chest pain, palpitations and near-syncope.   Gastrointestinal:  Negative for abdominal pain, bowel  incontinence, nausea and vomiting.   Genitourinary:  Negative for bladder incontinence.   Musculoskeletal:  Negative for back pain and neck pain.   Neurological:  Positive for dizziness, focal weakness, weakness, light-headedness and loss of balance. Negative for vertigo, syncope and headaches.   Psychiatric/Behavioral:  Negative for confusion and memory loss.        Objective   /74   Pulse 63   Temp 36.1 °C (97 °F)   Wt 74.8 kg (165 lb)   SpO2 96%   BMI 27.46 kg/m²     Physical Exam  Constitutional:       General: She is not in acute distress.     Appearance: Normal appearance.   HENT:      Head: Normocephalic.      Mouth/Throat:      Mouth: Mucous membranes are moist.   Eyes:      Extraocular Movements: Extraocular movements intact.      Conjunctiva/sclera: Conjunctivae normal.   Cardiovascular:      Rate and Rhythm: Normal rate and regular rhythm.      Heart sounds: No murmur heard.  Pulmonary:      Breath sounds: No wheezing or rhonchi.   Musculoskeletal:      Cervical back: Neck supple.   Skin:     General: Skin is warm and dry.   Neurological:      Mental Status: She is alert.   Psychiatric:         Attention and Perception: Attention and perception normal.         Mood and Affect: Mood normal.         Speech: Speech is delayed.         Behavior: Behavior normal. Behavior is cooperative.         Cognition and Memory: Cognition normal.         Assessment/Plan   Problem List Items Addressed This Visit       Type 2 diabetes mellitus (CMS/HCC) (Chronic)     Mgmt per endocrinology.          Benign essential hypertension (Chronic)    Hyperlipidemia (Chronic)     Continue rosuvastatin.          Postablative hypothyroidism (Chronic)     Recommend start taking medication as prescribed by endocrinologist.          Dry mouth     Negative Sjogren's antibodies 2023. Seen by ENT.ENT referred to oral surgeon to discuss mouth biopsy.         Functional gait abnormality     Continue PT.         Relevant Orders     Disability Placard    Brain fog - Primary     Suspect due to uncontrolled thyroid disease as recent TSH 39. Recommend start taking the 88mcg of levothyroxine that was ordered by endocrinologist. Patient to reach out to specialist regarding repeat labs.         Relevant Orders    Referral to [facility]

## 2023-07-18 NOTE — PATIENT INSTRUCTIONS
- Bring your  to your next appointment.   - Call Dr. Ingram regarding labs and medication for your thyroid and diabetes.

## 2023-07-19 NOTE — ASSESSMENT & PLAN NOTE
Suspect due to uncontrolled thyroid disease as recent TSH 39. Recommend start taking the 88mcg of levothyroxine that was ordered by endocrinologist. Patient to reach out to specialist regarding repeat labs.

## 2023-07-19 NOTE — ASSESSMENT & PLAN NOTE
Negative Sjogren's antibodies 2023. Seen by ENT.ENT referred to oral surgeon to discuss mouth biopsy.

## 2023-07-25 LAB
RHEUMATOID FACTOR (IU/ML) IN SERUM OR PLASMA: <10 IU/ML (ref 0–15)
SEDIMENTATION RATE, ERYTHROCYTE: 5 MM/H (ref 0–30)
THYROTROPIN (MIU/L) IN SER/PLAS BY DETECTION LIMIT <= 0.05 MIU/L: 37.75 MIU/L (ref 0.44–3.98)
THYROXINE (T4) FREE (NG/DL) IN SER/PLAS: 0.8 NG/DL (ref 0.61–1.12)

## 2023-07-26 LAB — ANTI-NUCLEAR ANTIBODY (ANA): NEGATIVE

## 2023-07-29 LAB
VITAMIN A (RETINOL): 0.67 MG/L (ref 0.3–1.2)
VITAMIN A (RETINYL PALMITATE): 0.04 MG/L (ref 0–0.1)
VITAMIN A, INTERPRETATION: NORMAL

## 2023-08-14 ENCOUNTER — PATIENT MESSAGE (OUTPATIENT)
Dept: PRIMARY CARE | Facility: CLINIC | Age: 68
End: 2023-08-14
Payer: MEDICARE

## 2023-08-14 DIAGNOSIS — M62.81 MUSCLE WEAKNESS (GENERALIZED): ICD-10-CM

## 2023-08-14 DIAGNOSIS — Z86.16 HISTORY OF COVID-19: Primary | ICD-10-CM

## 2023-08-16 ENCOUNTER — TELEPHONE (OUTPATIENT)
Dept: PRIMARY CARE | Facility: CLINIC | Age: 68
End: 2023-08-16
Payer: MEDICARE

## 2023-08-16 NOTE — TELEPHONE ENCOUNTER
"Called pt and informed her that if \"[place]\" is a COVID Clinic than referral should work... it does not specify a location on referral. I informed the patient if she has any issues to let us know.   "

## 2023-08-16 NOTE — TELEPHONE ENCOUNTER
----- Message from Bibiana Dupree DO sent at 8/16/2023 10:37 AM EDT -----  Regarding: FW: post covid vaccine number 3  Contact: [phone]  Please contact patient to get information for requested referral  ----- Message -----  From: Katie Lam RN  Sent: 8/16/2023  10:21 AM EDT  To: Bibiana Dupree DO  Subject: FW: post covid vaccine number 3                    ----- Message -----  From: Юлия Ku  Sent: 8/16/2023   9:43 AM EDT  To: #  Subject: post covid vaccine number 3                      could you place the referral to the [place] one?  THe one here is Oct. 26th!!

## 2023-09-01 LAB — B. BURGDORFERI VLSE1/PEPC10 ABS, ELISA: 0.36 IV

## 2023-09-04 LAB
DIPHTHERIA ANTIBODY: 0.6 IU/ML
PNEUMO SEROTYPE INTERPRETATION: NORMAL
SEROTYPE 1, VIRC: 0.18 UG/ML
SEROTYPE 12F, VIRC: <0.04 UG/ML
SEROTYPE 14, VIRC: 0.79 UG/ML
SEROTYPE 18C(56), VIRC: <0.05 UG/ML
SEROTYPE 19F, VIRC: 2.98 UG/ML
SEROTYPE 23F, VIRC: 0.1 UG/ML
SEROTYPE 3, VIRC: 0.16 UG/ML
SEROTYPE 4, VIRC: 0.09 UG/ML
SEROTYPE 5, VIRC: 0.06 UG/ML
SEROTYPE 6B(26), VIRC: 0.04 UG/ML
SEROTYPE 7F(51), VIRC: 0.29 UG/ML
SEROTYPE 8, VIRC: <0.03 UG/ML
SEROTYPE 9N, VIRC: 0.2 UG/ML
SEROTYPE 9V(68), VIRC: 0.06 UG/ML
TETANUS AB IGG: 0.7 IU/ML

## 2023-09-07 LAB — THYROTROPIN (MIU/L) IN SER/PLAS BY DETECTION LIMIT <= 0.05 MIU/L: 0.75 MIU/L (ref 0.44–3.98)

## 2023-09-11 ENCOUNTER — OFFICE VISIT (OUTPATIENT)
Dept: PRIMARY CARE | Facility: CLINIC | Age: 68
End: 2023-09-11
Payer: MEDICARE

## 2023-09-11 VITALS
HEART RATE: 91 BPM | WEIGHT: 161 LBS | OXYGEN SATURATION: 96 % | SYSTOLIC BLOOD PRESSURE: 144 MMHG | BODY MASS INDEX: 26.79 KG/M2 | TEMPERATURE: 97.1 F | DIASTOLIC BLOOD PRESSURE: 92 MMHG

## 2023-09-11 DIAGNOSIS — R41.89 COGNITIVE CHANGES: Primary | ICD-10-CM

## 2023-09-11 DIAGNOSIS — E03.9 HYPOTHYROIDISM, UNSPECIFIED TYPE: ICD-10-CM

## 2023-09-11 DIAGNOSIS — R41.89 BRAIN FOG: ICD-10-CM

## 2023-09-11 DIAGNOSIS — R26.9 GAIT ABNORMALITY: ICD-10-CM

## 2023-09-11 DIAGNOSIS — L65.9 HAIR LOSS: ICD-10-CM

## 2023-09-11 DIAGNOSIS — R68.2 DRY MOUTH: ICD-10-CM

## 2023-09-11 PROCEDURE — 4010F ACE/ARB THERAPY RXD/TAKEN: CPT | Performed by: FAMILY MEDICINE

## 2023-09-11 PROCEDURE — 1036F TOBACCO NON-USER: CPT | Performed by: FAMILY MEDICINE

## 2023-09-11 PROCEDURE — 1160F RVW MEDS BY RX/DR IN RCRD: CPT | Performed by: FAMILY MEDICINE

## 2023-09-11 PROCEDURE — 3077F SYST BP >= 140 MM HG: CPT | Performed by: FAMILY MEDICINE

## 2023-09-11 PROCEDURE — 99215 OFFICE O/P EST HI 40 MIN: CPT | Performed by: FAMILY MEDICINE

## 2023-09-11 PROCEDURE — 1159F MED LIST DOCD IN RCRD: CPT | Performed by: FAMILY MEDICINE

## 2023-09-11 PROCEDURE — 3080F DIAST BP >= 90 MM HG: CPT | Performed by: FAMILY MEDICINE

## 2023-09-11 RX ORDER — LEVOTHYROXINE SODIUM 100 UG/1
100 TABLET ORAL DAILY
Qty: 90 TABLET | Refills: 1 | Status: SHIPPED | OUTPATIENT
Start: 2023-09-11 | End: 2024-04-11 | Stop reason: SDUPTHER

## 2023-09-11 RX ORDER — MIDODRINE HYDROCHLORIDE 5 MG/1
5 TABLET ORAL 3 TIMES DAILY
COMMUNITY
Start: 2023-08-30 | End: 2023-10-26 | Stop reason: WASHOUT

## 2023-09-11 ASSESSMENT — ENCOUNTER SYMPTOMS
CHILLS: 0
WEAKNESS: 1
FATIGUE: 1
FEVER: 0

## 2023-09-11 NOTE — LETTER
September 15, 2023     Юлия Ku  [address] [place] [address]    Patient: Юлия Ku   YOB: 1955   Date of Visit: 9/11/2023       Dear Dr. Obrien    I recently saw our mutual patient Юлия Ku. Over the last year she has had significant decline in both her cognition function and changes in her gait. Her neurologic work-up     Sincerely,     Bibiana Dupree, DO      ______________________________________________________________________________________    Subjective  Patient ID: Юлия Ku is a 68 y.o. female who presents for cognitive function (Discuss MOCA test).    Юлия presents with her sister for follow-up.    She continues to notice brain fog. Sister has noticed that her memory has declined. Scheduled with [facility] on 9/26. MRI done. Labs done. Here for further testing today.     Also has noticed hair loss. Located throughout scalp.          Review of Systems   Constitutional:  Positive for fatigue. Negative for chills and fever.   Musculoskeletal:  Positive for gait problem.   Neurological:  Positive for weakness.       Objective  BP (!) 144/92   Pulse 91   Temp 36.2 °C (97.1 °F)   Wt 73 kg (161 lb)   SpO2 96%   BMI 26.79 kg/m²     Physical Exam  Constitutional:       General: She is not in acute distress.     Appearance: Normal appearance.   HENT:      Head: Normocephalic.   Pulmonary:      Effort: Pulmonary effort is normal.   Neurological:      General: No focal deficit present.      Mental Status: She is alert.   Psychiatric:         Mood and Affect: Mood normal.         Assessment/Plan  Problem List Items Addressed This Visit       Cognitive changes - Primary     MOCA normal 26/30 on 9/11/23         Relevant Orders    Referral to Neuropsychology    Hypothyroidism     TSH at goal          Relevant Medications    levothyroxine (Synthroid, Levoxyl) 100 mcg tablet    Gait abnormality     Work-up negative including lumbar MRI, brain MRI, vitamin  B12 testing. Check for rare deficiencies that could be contributing to gait changes. Consult with  rheumatology to see if Sjogren's could be impacting gait.          Relevant Orders    Folate    Vitamin E    Copper, serum    Hair loss     Check iron panel.          Relevant Orders    CBC and Auto Differential    Iron and TIBC    Ferritin            Time Based Billing:  - Prep Time on Date of Patient Encounter:  5 minutes  - Time Directly with Patient/Family/Caregiver:   42 minutes  - Documentation Time:   5 minutes    Total Minutes:  52

## 2023-09-11 NOTE — ASSESSMENT & PLAN NOTE
Work-up negative including lumbar MRI, brain MRI, vitamin B12 testing. Check for rare deficiencies that could be contributing to gait changes. Consult with  rheumatology to see if Sjogren's could be impacting gait.

## 2023-09-11 NOTE — PROGRESS NOTES
Subjective   Patient ID: Юлия Ku is a 68 y.o. female who presents for cognitive function (Discuss MOCA test).    Юлия presents with her sister for follow-up.    She continues to notice brain fog. Sister has noticed that her memory has declined. Scheduled with [facility] on 9/26. MRI done. Labs done. Here for further testing today.     Also has noticed hair loss. Located throughout scalp.          Review of Systems   Constitutional:  Positive for fatigue. Negative for chills and fever.   Musculoskeletal:  Positive for gait problem.   Neurological:  Positive for weakness.       Objective   BP (!) 144/92   Pulse 91   Temp 36.2 °C (97.1 °F)   Wt 73 kg (161 lb)   SpO2 96%   BMI 26.79 kg/m²     Physical Exam  Constitutional:       General: She is not in acute distress.     Appearance: Normal appearance.   HENT:      Head: Normocephalic.   Pulmonary:      Effort: Pulmonary effort is normal.   Neurological:      General: No focal deficit present.      Mental Status: She is alert.   Psychiatric:         Mood and Affect: Mood normal.         Assessment/Plan   Problem List Items Addressed This Visit       Cognitive changes - Primary     MOCA normal 26/30 on 9/11/23         Relevant Orders    Referral to Neuropsychology    Hypothyroidism     TSH at goal          Relevant Medications    levothyroxine (Synthroid, Levoxyl) 100 mcg tablet    Gait abnormality     Work-up negative including lumbar MRI, brain MRI, vitamin B12 testing. Check for rare deficiencies that could be contributing to gait changes. Consult with  rheumatology to see if Sjogren's could be impacting gait.          Relevant Orders    Folate    Vitamin E    Copper, serum    Hair loss     Check iron panel.          Relevant Orders    CBC and Auto Differential    Iron and TIBC    Ferritin            Time Based Billing:  - Prep Time on Date of Patient Encounter:  5 minutes  - Time Directly with Patient/Family/Caregiver:   42 minutes  -  Documentation Time:   5 minutes    Total Minutes:  52

## 2023-09-11 NOTE — PATIENT INSTRUCTIONS
When you see Dr. Obrien:  - ask if he would agree with oral biopsy  - ask if he thnks that your brain fog and gait issues could be related to Sjogren's    Call 's office to let them know that your BP was 144/92 in PCP office. Does she want to change your midodrine?

## 2023-09-12 ENCOUNTER — LAB (OUTPATIENT)
Dept: LAB | Facility: LAB | Age: 68
End: 2023-09-12
Payer: MEDICARE

## 2023-09-12 DIAGNOSIS — L65.9 HAIR LOSS: ICD-10-CM

## 2023-09-12 DIAGNOSIS — R26.9 GAIT ABNORMALITY: ICD-10-CM

## 2023-09-12 LAB
BASOPHILS (10*3/UL) IN BLOOD BY AUTOMATED COUNT: 0.05 X10E9/L (ref 0–0.1)
BASOPHILS/100 LEUKOCYTES IN BLOOD BY AUTOMATED COUNT: 0.9 % (ref 0–2)
EOSINOPHILS (10*3/UL) IN BLOOD BY AUTOMATED COUNT: 0.06 X10E9/L (ref 0–0.7)
EOSINOPHILS/100 LEUKOCYTES IN BLOOD BY AUTOMATED COUNT: 1 % (ref 0–6)
ERYTHROCYTE DISTRIBUTION WIDTH (RATIO) BY AUTOMATED COUNT: 13.4 % (ref 11.5–14.5)
ERYTHROCYTE MEAN CORPUSCULAR HEMOGLOBIN CONCENTRATION (G/DL) BY AUTOMATED: 31.9 G/DL (ref 32–36)
ERYTHROCYTE MEAN CORPUSCULAR VOLUME (FL) BY AUTOMATED COUNT: 93 FL (ref 80–100)
ERYTHROCYTES (10*6/UL) IN BLOOD BY AUTOMATED COUNT: 4.56 X10E12/L (ref 4–5.2)
FERRITIN (UG/LL) IN SER/PLAS: 19 UG/L (ref 8–150)
FOLATE (NG/ML) IN SER/PLAS: 17.8 NG/ML
HEMATOCRIT (%) IN BLOOD BY AUTOMATED COUNT: 42.3 % (ref 36–46)
HEMOGLOBIN (G/DL) IN BLOOD: 13.5 G/DL (ref 12–16)
IMMATURE GRANULOCYTES/100 LEUKOCYTES IN BLOOD BY AUTOMATED COUNT: 0.2 % (ref 0–0.9)
IRON (UG/DL) IN SER/PLAS: 41 UG/DL (ref 35–150)
IRON BINDING CAPACITY (UG/DL) IN SER/PLAS: 443 UG/DL (ref 240–445)
IRON SATURATION (%) IN SER/PLAS: 9 % (ref 25–45)
LEUKOCYTES (10*3/UL) IN BLOOD BY AUTOMATED COUNT: 5.8 X10E9/L (ref 4.4–11.3)
LYMPHOCYTES (10*3/UL) IN BLOOD BY AUTOMATED COUNT: 1.69 X10E9/L (ref 1.2–4.8)
LYMPHOCYTES/100 LEUKOCYTES IN BLOOD BY AUTOMATED COUNT: 29.2 % (ref 13–44)
MONOCYTES (10*3/UL) IN BLOOD BY AUTOMATED COUNT: 0.6 X10E9/L (ref 0.1–1)
MONOCYTES/100 LEUKOCYTES IN BLOOD BY AUTOMATED COUNT: 10.4 % (ref 2–10)
NEUTROPHILS (10*3/UL) IN BLOOD BY AUTOMATED COUNT: 3.37 X10E9/L (ref 1.2–7.7)
NEUTROPHILS/100 LEUKOCYTES IN BLOOD BY AUTOMATED COUNT: 58.3 % (ref 40–80)
PLATELETS (10*3/UL) IN BLOOD AUTOMATED COUNT: 320 X10E9/L (ref 150–450)

## 2023-09-12 PROCEDURE — 83540 ASSAY OF IRON: CPT

## 2023-09-12 PROCEDURE — 82746 ASSAY OF FOLIC ACID SERUM: CPT

## 2023-09-12 PROCEDURE — 84446 ASSAY OF VITAMIN E: CPT

## 2023-09-12 PROCEDURE — 83550 IRON BINDING TEST: CPT

## 2023-09-12 PROCEDURE — 36415 COLL VENOUS BLD VENIPUNCTURE: CPT

## 2023-09-12 PROCEDURE — 82728 ASSAY OF FERRITIN: CPT

## 2023-09-12 PROCEDURE — 85025 COMPLETE CBC W/AUTO DIFF WBC: CPT

## 2023-09-12 PROCEDURE — 82525 ASSAY OF COPPER: CPT

## 2023-09-15 LAB
COPPER: 101 UG/DL (ref 80–155)
VITAMIN E (ALPHA-TOCOPHEROL): 11.2 MG/L (ref 5.5–18)
VITAMIN E (GAMMA-TOCOPHEROL): 0.6 MG/L (ref 0–6)

## 2023-09-18 ENCOUNTER — APPOINTMENT (OUTPATIENT)
Dept: PRIMARY CARE | Facility: CLINIC | Age: 68
End: 2023-09-18
Payer: MEDICARE

## 2023-10-12 ASSESSMENT — ANXIETY QUESTIONNAIRES
3. WORRYING TOO MUCH ABOUT DIFFERENT THINGS: NOT AT ALL
4. TROUBLE RELAXING: NOT AT ALL
1. FEELING NERVOUS, ANXIOUS, OR ON EDGE: NOT AT ALL
2. NOT BEING ABLE TO STOP OR CONTROL WORRYING: NOT AT ALL
6. BECOMING EASILY ANNOYED OR IRRITABLE: NOT AT ALL
7. FEELING AFRAID AS IF SOMETHING AWFUL MIGHT HAPPEN: NOT AT ALL
GAD7 TOTAL SCORE: 0
5. BEING SO RESTLESS THAT IT IS HARD TO SIT STILL: NOT AT ALL

## 2023-10-12 ASSESSMENT — PATIENT HEALTH QUESTIONNAIRE - PHQ9
3. TROUBLE FALLING OR STAYING ASLEEP OR SLEEPING TOO MUCH: SEVERAL DAYS
5. POOR APPETITE OR OVEREATING: NOT AT ALL
7. TROUBLE CONCENTRATING ON THINGS, SUCH AS READING THE NEWSPAPER OR WATCHING TELEVISION: SEVERAL DAYS
1. LITTLE INTEREST OR PLEASURE IN DOING THINGS: NOT AT ALL
4. FEELING TIRED OR HAVING LITTLE ENERGY: MORE THAN HALF THE DAYS
10. IF YOU CHECKED OFF ANY PROBLEMS, HOW DIFFICULT HAVE THESE PROBLEMS MADE IT FOR YOU TO DO YOUR WORK, TAKE CARE OF THINGS AT HOME, OR GET ALONG WITH OTHER PEOPLE: SOMEWHAT DIFFICULT
2. FEELING DOWN, DEPRESSED OR HOPELESS: NOT AT ALL
SUM OF ALL RESPONSES TO PHQ9 QUESTIONS 1 & 2: 0
6. FEELING BAD ABOUT YOURSELF - OR THAT YOU ARE A FAILURE OR HAVE LET YOURSELF OR YOUR FAMILY DOWN: NOT AT ALL
8. MOVING OR SPEAKING SO SLOWLY THAT OTHER PEOPLE COULD HAVE NOTICED. OR THE OPPOSITE, BEING SO FIGETY OR RESTLESS THAT YOU HAVE BEEN MOVING AROUND A LOT MORE THAN USUAL: SEVERAL DAYS
SUM OF ALL RESPONSES TO PHQ QUESTIONS 1-9: 5
9. THOUGHTS THAT YOU WOULD BE BETTER OFF DEAD, OR OF HURTING YOURSELF: NOT AT ALL

## 2023-10-16 ENCOUNTER — APPOINTMENT (OUTPATIENT)
Dept: NEUROLOGY | Facility: CLINIC | Age: 68
End: 2023-10-16
Payer: MEDICARE

## 2023-10-17 ENCOUNTER — CLINICAL SUPPORT (OUTPATIENT)
Dept: OTHER | Facility: CLINIC | Age: 68
End: 2023-10-17
Payer: MEDICARE

## 2023-10-17 DIAGNOSIS — Z86.16 PERSONAL HISTORY OF COVID-19: ICD-10-CM

## 2023-10-17 ASSESSMENT — MONTREAL COGNITIVE ASSESSMENT (MOCA)
9. REPEAT EACH SENTENCE: 2
VISUOSPATIAL/EXECUTIVE SUBSCORE: 0
WHAT LEVEL OF EDUCATION WAS ATTAINED: 0
11. FOR EACH PAIR OF WORDS, WHAT CATEGORY DO THEY BELONG TO (OUT OF 2): 2
12. MEMORY INDEX SCORE: 1
6. READ LIST OF DIGITS [FORWARD/BACKWARD]: 2
10. [FLUENCY] NAME WORDS STARTING WITH DESIGNATED LETTER: 0
13. ORIENTATION SUBSCORE: 6
8. SERIAL SUBTRACTION OF 7S: 3
4. NAME EACH OF THE THREE ANIMALS SHOWN: 0
5. MEMORY TRIALS: 0
WHAT IS THE TOTAL SCORE (OUT OF 30): 17
7. [VIGILENCE] TAP WHEN HEARING DESIGNATED LETTER: 1

## 2023-10-17 ASSESSMENT — SLEEP AND FATIGUE QUESTIONNAIRES
EASILY_FATIGUED: 4
FATIGUE_INTERFERES_SOCIAL_LIFE: 5
FATIGUE_INTERFERES_RESPONSIBILITIES: 5
MY FATIGUE PREVENTS SUSTAINED PHYSICAL FUNCTIONING.: 5
FATIGUE_INTERFERES_PHYSICAL_FUNCTIONING: 5
EXERCISE_BRINGS_ON_FATIGUE: 2
FATIGUE_MOST_DISABILING_SYMPTOM: 5
FATIGUE_CAUSES_FREQUENT_PROBLEMTS: 5
MY MOTIVATION IS LOWER WHEN I AM FATIGUED.: 5

## 2023-10-20 ASSESSMENT — PATIENT HEALTH QUESTIONNAIRE - PHQ9
SUM OF ALL RESPONSES TO PHQ QUESTIONS 1-9: 5
1. LITTLE INTEREST OR PLEASURE IN DOING THINGS: NOT AT ALL
8. MOVING OR SPEAKING SO SLOWLY THAT OTHER PEOPLE COULD HAVE NOTICED. OR THE OPPOSITE - BEING SO FIDGETY OR RESTLESS THAT YOU HAVE BEEN MOVING AROUND A LOT MORE THAN USUAL: SEVERAL DAYS
1. LITTLE INTEREST OR PLEASURE IN DOING THINGS: NOT AT ALL
7. TROUBLE CONCENTRATING ON THINGS, SUCH AS READING THE NEWSPAPER OR WATCHING TELEVISION: SEVERAL DAYS
7. TROUBLE CONCENTRATING ON THINGS, SUCH AS READING THE NEWSPAPER OR WATCHING TELEVISION: SEVERAL DAYS
3. TROUBLE FALLING OR STAYING ASLEEP OR SLEEPING TOO MUCH: SEVERAL DAYS
10. IF YOU CHECKED OFF ANY PROBLEMS, HOW DIFFICULT HAVE THESE PROBLEMS MADE IT FOR YOU TO DO YOUR WORK, TAKE CARE OF THINGS AT HOME, OR GET ALONG WITH OTHER PEOPLE: SOMEWHAT DIFFICULT
10. IF YOU CHECKED OFF ANY PROBLEMS, HOW DIFFICULT HAVE THESE PROBLEMS MADE IT FOR YOU TO DO YOUR WORK, TAKE CARE OF THINGS AT HOME, OR GET ALONG WITH OTHER PEOPLE: SOMEWHAT DIFFICULT
9. THOUGHTS THAT YOU WOULD BE BETTER OFF DEAD, OR OF HURTING YOURSELF: NOT AT ALL
5. POOR APPETITE OR OVEREATING: NOT AT ALL
4. FEELING TIRED OR HAVING LITTLE ENERGY: MORE THAN HALF THE DAYS
8. MOVING OR SPEAKING SO SLOWLY THAT OTHER PEOPLE COULD HAVE NOTICED. OR THE OPPOSITE, BEING SO FIGETY OR RESTLESS THAT YOU HAVE BEEN MOVING AROUND A LOT MORE THAN USUAL: SEVERAL DAYS
9. THOUGHTS THAT YOU WOULD BE BETTER OFF DEAD, OR OF HURTING YOURSELF: NOT AT ALL
3. TROUBLE FALLING OR STAYING ASLEEP: SEVERAL DAYS
4. FEELING TIRED OR HAVING LITTLE ENERGY: MORE THAN HALF THE DAYS
2. FEELING DOWN, DEPRESSED OR HOPELESS: NOT AT ALL
6. FEELING BAD ABOUT YOURSELF - OR THAT YOU ARE A FAILURE OR HAVE LET YOURSELF OR YOUR FAMILY DOWN: NOT AT ALL
2. FEELING DOWN, DEPRESSED OR HOPELESS: NOT AT ALL
SUM OF ALL RESPONSES TO PHQ9 QUESTIONS 1 & 2: 0
5. POOR APPETITE OR OVEREATING: NOT AT ALL
6. FEELING BAD ABOUT YOURSELF - OR THAT YOU ARE A FAILURE OR HAVE LET YOURSELF OR YOUR FAMILY DOWN: NOT AT ALL

## 2023-10-20 ASSESSMENT — SLEEP AND FATIGUE QUESTIONNAIRES
FATIGUE_MOST_DISABILING_SYMPTOM: 5
EASILY_FATIGUED: 4
MY MOTIVATION IS LOWER WHEN I AM FATIGUED.: 5
MY MOTIVATION IS LOWER WHEN I AM FATIGUED.: 5
EXERCISE_BRINGS_ON_FATIGUE: 2
AVERAGE_FSS_SCORE: 4.56
EXERCISE_BRINGS_ON_FATIGUE: 2
FATIGUE_INTERFERES_PHYSICAL_FUNCTIONING: 5
FATIGUE_INTERFERES_RESPONSIBILITIES: 5
FATIGUE_INTERFERES_PHYSICAL_FUNCTIONING: 5
FATIGUE_CAUSES_FREQUENT_PROBLEMTS: 5
FATIGUE_INTERFERES_RESPONSIBILITIES: 5
VISUAL ANALOGUE FATIGUE SCALE (VAFS): 3
MY FATIGUE PREVENTS SUSTAINED PHYSICAL FUNCTIONING.: 5
FATIGUE_INTERFERES_SOCIAL_LIFE: 5
FATIGUE_CAUSES_FREQUENT_PROBLEMTS: 5
FATIGUE_MOST_DISABILING_SYMPTOM: 5
EASILY_FATIGUED: 4
MY FATIGUE PREVENTS SUSTAINED PHYSICAL FUNCTIONING.: 5
FATIGUE_INTERFERES_SOCIAL_LIFE: 5

## 2023-10-20 ASSESSMENT — ANXIETY QUESTIONNAIRES
6. BECOMING EASILY ANNOYED OR IRRITABLE: NOT AT ALL
3. WORRYING TOO MUCH ABOUT DIFFERENT THINGS: NOT AT ALL
4. TROUBLE RELAXING: NOT AT ALL
2. NOT BEING ABLE TO STOP OR CONTROL WORRYING: NOT AT ALL
GAD7 TOTAL SCORE: 0
7. FEELING AFRAID AS IF SOMETHING AWFUL MIGHT HAPPEN: NOT AT ALL
1. FEELING NERVOUS, ANXIOUS, OR ON EDGE: NOT AT ALL
4. TROUBLE RELAXING: NOT AT ALL
5. BEING SO RESTLESS THAT IT IS HARD TO SIT STILL: NOT AT ALL
7. FEELING AFRAID AS IF SOMETHING AWFUL MIGHT HAPPEN: NOT AT ALL
1. FEELING NERVOUS, ANXIOUS, OR ON EDGE: NOT AT ALL
2. NOT BEING ABLE TO STOP OR CONTROL WORRYING: NOT AT ALL
6. BECOMING EASILY ANNOYED OR IRRITABLE: NOT AT ALL
3. WORRYING TOO MUCH ABOUT DIFFERENT THINGS: NOT AT ALL
5. BEING SO RESTLESS THAT IT IS HARD TO SIT STILL: NOT AT ALL

## 2023-10-20 ASSESSMENT — LIFESTYLE VARIABLES
USE_ALCOHOL_TO_HELP_SLEEP: NO
DO_YOU_DRINK?: I DID NOT DO THIS BEFORE COVID-19

## 2023-10-25 NOTE — PROGRESS NOTES
NPV In-Person    Subjective   COVID-19 Infection Date:  01/2021  PCR confirmed (sx: Fatigue, Loss or disturbed taste, Brain fog  - no hospitalization, no treatment)    COVID-19 vaccine status: Pfizer 03/30/21, 04/20/21, 10/29/21, 09/16/22, 09/20/23    Occupation:     Current Providers: PCP Dr. Bibiana millan, Endocrinology Dr. Ingram, Neurology Movement Dr. Ibarra, Neurology MS Dr. Zavala,  Rheumatology Dr. Obrien, Cardiology Dr. Funez    Survey Scores: 10/2023  PHQ-9: 5    MARGO-7: 0    Sleep Wellness: 11, snores  FSS average: 4.56    Modified Ecog average: 1.58    MOCA: 17/22 (10/2023)  Overall Health: 45     68 y.o. female with a h/o COVID-19 in January 2021, DM2, HTN, HLD, Grave's disease s/p ablation now on thyroid replacement, CAD, presents to [facility] care at [facility] with c/o Dry mouth, freezing gait, fatigue, brain fog, poor sleep     Has been struggling with dry mouth since November 2022, no sores, some choking on water, not with food  Loses taste by the end of the day  Smell has been OK  Had three blood tests for Sjogren's that were negative  Some moisture in her mouth but it gets slimey  Dry eyes have been worse  Was seen by dry mouth specialist in [place], was told there is no cure if it is, so did not proceed with biopsy  In June 2022 fell down the stairs and then off her bike, fell walking for the first time av 2022  Gait freezes when she turns or stops and then starts to walk again  Dr. Ibarra prescribed carbidopa/Levodopa, that seems to help some, doses do not seem to last long enough, not on max dose yet, next week will be at goal, hasn't fallen in 3 weeks  Medication is worsening the fatigue and brain fog  In Neuro PT at [facility], doesn't seem to help, more mindful of how to cope  Evaluation by MS specialist was negative   Has been losing weight, 45lbs total  Eating less due to dry mouth, does not feel as hungry as she used to  Constipation, takes dulcolax  every other day, has not been taking iron supplement as this is causing constipation  Was constipated as a child, off and on in between, was not constipated for a long time and then it returned a few months ago  No n/v or acid reflux  Urinary urgency, drinking more because of dry mouth  Fatigue in the morning, eases up by 4pm  Not sleeping well at night, tossing for 3 hours before she falls asleep, wakes up at 3-4, stays awake until 6am  Dozing in the morning after breakfast  Snores, has not had sleep study yet  Brain fog, having outer body experiences, feels as though reality is a dream, would rather be sleeping  Forgetful, cannot drive, cannot go shopping, difficulty paying attention, word finding difficulties  First noticed brain fog a few months ago  No new or worsening headaches  When she stares closely at something then eyes go crazy, cannot focus   No hearing changes, no tinnitus  No dizziness, no numbness or tingling  Feels weak in her hands and feet, signature has changed, no tremors  No edema, no rashes or sores, no abnormal bleeding or bruising, bruising on knees for falling  No SOB or cough, fatigue and exhausted after walking here  No chest pain or palpitations  Tries to read her BP often, in case it gets too high, had to stop midodrine because BP increased too much  Sometimes feels depressed, would like to partake in group therapy for Long COVID  Notes that she has been seeing a chiropractor  Also saw Ivelisse Braxton with internal medicine [place] [facility] who recommended a colonoscopy, full body scan, mammogram to rule out cancer, recommended neuromuscular doctor and EMG testing    full ROS completed and all negative unless noted in HPI     Relevant prior healthcare visits:  -10/2023 Neuro PT at [facility] notes impairment in balance, coordination, gait,  posture  -10/2023 Neurology [facility] for primary freezing of gait, trial Sinemet, repeat PT, may consider amantadine in the future, consider DaTscan if mediations  prove ineffective, recommends to increase caloric intake  -09/2023 Rheumatology [facility] for evaluation of CTD, notes that Sjogren's test negative, lip biopsy was advised but not done, notes patient is interested in Paxlovid treatment for PASC, plan to observe  -09/2023 PCP referred to neuropsychology for worsening brain fog, consult to Rheumatology for gait changes  -09/2023 SLP [facility] for cognitive deficits and communication deficits  -08/2023 Cardiology for RV dysfunction without pulmonary HTN, ischemic evaluation and RHC was negative, plans to repeat echocardiogram and event monitor, notes that orthostatics were positive in office so will try on midodrine, though believes that shuffling gait and brain fog is neurological in nature and she would benefit form second opinion  -08/2023 OT notes improvement in overal executive functioning and L hand FMC, however slight decline in bilateral hand  strength and L gross shoulder strength    Relevant prior diagnostic studies:  -09/2023 echocardiogram   -03/2023 MRI brain unremarkable, MRI C-spine with cervical spondylosis    Relevant prior laboratory values, unremarkable unless noted:  -09/2023 Ferritin 19, iron studies low, CBC/D, Copper, Vitamin E, Folate, TSH  -08/2023 Tetanus Ab, Diphteria Ab, Strep Pneumo Ab, Lyme Ab  -07/2023 Esr, Vitamin A, MONALISA, RF, Thyroxine  -06/2023 Tryptase, Allergy respiratory panel abnormal, thyroid perox, thyroglobulin, Ttransglutaminase, IGA low, HbA1c 6.2%, IGG low, beta 2 glycoprotein, cardiolipin ab  -06/2023 cryoglobulin  -04/2023 Heptatis C ab, MONALISA, AChR bidning Ab, Vitamin D, Vitamin B12, Anti-SSB and SSA    Exercise routine:  Diet:  Weight hx: pre-COVID-19 201  lbs -> post-COVID-19 157  lbs  Substance use: no tobacco use hx  Social:      Current Outpatient Medications:     aspirin 81 mg EC tablet, Take 1 tablet (81 mg) by mouth once daily., Disp: , Rfl:     calcium citrate-vitamin D3 (Citracal+D) 315 mg-5 mcg (200 unit) tablet, Take 1  tablet by mouth once daily., Disp: , Rfl:     carbidopa-levodopa (Parcopa)  mg disintegrating tablet, Take 0.5 tablets by mouth 3 times a day., Disp: , Rfl:     levothyroxine (Synthroid, Levoxyl) 100 mcg tablet, Take 1 tablet (100 mcg) by mouth once daily., Disp: 90 tablet, Rfl: 1    losartan (Cozaar) 50 mg tablet, Take 1 tablet (50 mg) by mouth once daily., Disp: 90 tablet, Rfl: 1    LUTEIN ORAL, Lutein 25MG OTC, Disp: , Rfl:     magnesium 250 mg tablet, OTC, Disp: , Rfl:     magnesium gluconate (Magonate) 27.5 mg magne- sium (500 mg) tablet, Take 250 mg by mouth once daily., Disp: , Rfl:     metFORMIN XR (Glucophage-XR) 500 mg 24 hr tablet, Take 1 tablet (500 mg) by mouth once daily in the evening. Take with meals., Disp: , Rfl:     omega-3/dha/epa/fish oil (OMEGA-3 ORAL), Super Omega 3 Epa/Dha 1000 MG oral capsules OTC, Disp: , Rfl:     pen needle, diabetic (BD ULTRA-FINE RUPAL PEN NEEDLE MISC), 2 times a day. Use with insulin (90 day supply) BD Pen Needle/Rupal/Ultra-fine/32G X 4MM, Disp: , Rfl:     rosuvastatin (Crestor) 10 mg tablet, Take 1 tablet (10 mg) by mouth once daily at bedtime., Disp: 90 tablet, Rfl: 0    Past Medical History:   Diagnosis Date    Personal history of irradiation     Status post radioactive iodine thyroid ablation    Personal history of other diseases of the circulatory system     History of hypertension    Personal history of other endocrine, nutritional and metabolic disease     History of type 2 diabetes mellitus    Personal history of other endocrine, nutritional and metabolic disease     History of Graves' disease    Personal history of other endocrine, nutritional and metabolic disease     History of hyperlipidemia    Personal history of other endocrine, nutritional and metabolic disease     History of hypothyroidism       Past Surgical History:   Procedure Laterality Date    OTHER SURGICAL HISTORY  01/12/2021    No history of surgery       Family History   Problem Relation  "Name Age of Onset    Alzheimer's disease Mother      Hypertension Mother      Heart disease Maternal Grandfather         Objective   /75 (BP Location: Left arm, Patient Position: Sitting, BP Cuff Size: Adult)   Pulse 91   Temp 36.9 °C (98.4 °F) (Temporal)   Ht 1.613 m (5' 3.5\")   Wt 72.1 kg (159 lb)   SpO2 97%   BMI 27.72 kg/m²     Physical Exam  Vitals reviewed.   Constitutional:       Appearance: Normal appearance.   HENT:      Mouth/Throat:      Mouth: Mucous membranes are moist.   Eyes:      Conjunctiva/sclera: Conjunctivae normal.   Cardiovascular:      Rate and Rhythm: Normal rate and regular rhythm.      Heart sounds: Normal heart sounds.   Pulmonary:      Effort: Pulmonary effort is normal.      Breath sounds: Normal breath sounds.   Abdominal:      General: Bowel sounds are normal.      Palpations: Abdomen is soft.   Musculoskeletal:         General: Normal range of motion.      Cervical back: Neck supple.   Skin:     General: Skin is warm and dry.   Neurological:      General: No focal deficit present.      Gait: Gait abnormal.   Psychiatric:         Mood and Affect: Mood normal.         Cognition and Memory: Cognition normal.         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 Taking this along with orange juice or a " "Vitamin C supplement increases absorption.   -I will discuss dry mouth and freezing gait with other providers for further guidance  -additional recommendations provided under \"Patient Education\" section            Relevant Orders    Home sleep apnea test (HSAT)    Referral to Psychology    Referral to Occupational Therapy    Vitamin B6    Cortisol AM    Cortisol PM    Immunoglobulins, IgG, IgA, IgM     Other Visit Diagnoses         Codes    Brain fog     R41.89    Relevant Orders    Referral to Occupational Therapy    Postviral fatigue syndrome     G93.31    Relevant Orders    Home sleep apnea test (HSAT)    Referral to Occupational Therapy            "

## 2023-10-26 ENCOUNTER — OFFICE VISIT (OUTPATIENT)
Dept: OTHER | Facility: CLINIC | Age: 68
End: 2023-10-26
Payer: MEDICARE

## 2023-10-26 VITALS
TEMPERATURE: 98.4 F | SYSTOLIC BLOOD PRESSURE: 121 MMHG | WEIGHT: 159 LBS | DIASTOLIC BLOOD PRESSURE: 75 MMHG | OXYGEN SATURATION: 97 % | HEART RATE: 91 BPM | HEIGHT: 64 IN | BODY MASS INDEX: 27.14 KG/M2

## 2023-10-26 DIAGNOSIS — G93.31 POSTVIRAL FATIGUE SYNDROME: ICD-10-CM

## 2023-10-26 DIAGNOSIS — R41.89 BRAIN FOG: ICD-10-CM

## 2023-10-26 DIAGNOSIS — Z86.16 PERSONAL HISTORY OF COVID-19: ICD-10-CM

## 2023-10-26 DIAGNOSIS — U09.9 POST-ACUTE SEQUELAE OF COVID-19 (PASC): Primary | ICD-10-CM

## 2023-10-26 PROCEDURE — 1036F TOBACCO NON-USER: CPT | Performed by: NURSE PRACTITIONER

## 2023-10-26 PROCEDURE — 3074F SYST BP LT 130 MM HG: CPT | Performed by: NURSE PRACTITIONER

## 2023-10-26 PROCEDURE — G2212 PROLONG OUTPT/OFFICE VIS: HCPCS | Performed by: NURSE PRACTITIONER

## 2023-10-26 PROCEDURE — 99215 OFFICE O/P EST HI 40 MIN: CPT | Mod: ZK | Performed by: NURSE PRACTITIONER

## 2023-10-26 PROCEDURE — 1125F AMNT PAIN NOTED PAIN PRSNT: CPT | Performed by: NURSE PRACTITIONER

## 2023-10-26 PROCEDURE — 1160F RVW MEDS BY RX/DR IN RCRD: CPT | Performed by: NURSE PRACTITIONER

## 2023-10-26 PROCEDURE — 3078F DIAST BP <80 MM HG: CPT | Performed by: NURSE PRACTITIONER

## 2023-10-26 PROCEDURE — 1159F MED LIST DOCD IN RCRD: CPT | Performed by: NURSE PRACTITIONER

## 2023-10-26 PROCEDURE — 99205 OFFICE O/P NEW HI 60 MIN: CPT | Performed by: NURSE PRACTITIONER

## 2023-10-26 PROCEDURE — 4010F ACE/ARB THERAPY RXD/TAKEN: CPT | Performed by: NURSE PRACTITIONER

## 2023-10-26 RX ORDER — CARBIDOPA AND LEVODOPA 25; 100 MG/1; MG/1
0.5 TABLET, ORALLY DISINTEGRATING ORAL 3 TIMES DAILY
COMMUNITY
End: 2024-05-07 | Stop reason: WASHOUT

## 2023-10-26 ASSESSMENT — PAIN SCALES - GENERAL: PAINLEVEL: 6

## 2023-10-26 NOTE — PATIENT INSTRUCTIONS
It was my pleasure seeing you in the [facility] today.  We will focus on addressing the following symptoms discussed today: Dry mouth, freezing gait, fatigue, brain fog, poor sleep    My recommendations are as follows:  -Home Sleep Study to rule out sleep apnea as a cause/contribution to your symptoms   -referral to Long COVID Psychology  -referral to Occupational therapist Sommer Guardado for fatigue and brain fog  -additional bloodwork: repeat immunoglobulin levels, Vitamin B6, morning cortisol level (ideally before 9am) and evening cortisol level after 4pm  -shorty supplement recommendation: Ferrous Gluconate is typically well tolerated, you can take 27-38 mg elemental iron every other day. Taking this along with orange juice or a Vitamin C supplement increases absorption.   -I will discuss dry mouth and freezing gait with other providers for further guidance    Tips to help improve brain fog and fatigue:  --avoid drinking Alcohol while recovering from COVID  --ensure to practice 30 minutes of exercise 7 days per week to keep BNDGF (brain derived neurotrophic growth factor) elevated as this will help in the regeneration of neurons, you may split exercise time up into 5 minute increments if this is better tolerated.   --Keep a food diary that grades fatigue and brain fog (for example: Sunday pizza -> fatigue 5/10 and brain fog 1/10. Monday no food until 4pm -> fatigue 10/10 and brain fog 8/10). You may notice that carbohydrates are worsening your cognitive/mood symptoms 24hrs after ingestion. Avoid refined carbohydrates and added sugars.  --Try intermittent fasting to help with symptoms, for example 12 hours of fasting overnight with no food/drinks except for water between 7pm and 7am  --slowly increase your activity by no more than 10% per week, rest when you feel tired  --utilize pacing techniques to manage fatigue, schedule rest times throughout the day so you do not run out of energy, more information to  "be found on this here: http://[email or web address]/health-info-[email or web address]_covid-19_fatigue.pdf  --Review the  Health Talk on Managing Fatigue and Thinking Changes after COVID-19 here: https://www.[email or web address]/Health-Talks/articles/2022/05/managing-fatigue-and-thinking-changes-after-covid-19  --You can also find many helpful tips and tricks in this book: \"The Long COVID self-help guide, practical ways to manage symptoms\" by [facility] from the [facility] [place]    To help improve sleep:  --try Melatonin children's liquid drops 1-2mg underneath your tongue 30 minutes before you go to bed  --go to bed at the same time each night and get up at the same time each morning, including the weekend  --goal of 7-9 hours of uninterrupted sleep per night  --make sure your bedroom is quiet, dark, relaxing, and at a comfortable temperature  --remove electronic devices, such as TVs, computers, and smart phones, from your bedroom  --avoid caffeine after the morning and large meals before bed  --drink plenty of water throughout the day but avoid drinking fluids two hours before bed  --expose yourself to bright light in the morning  --engage in a calming bed-time routine of warm bath/shower, gratitude journal, guided meditation, etc.  --do not lay awake in bed for more than 20 minutes, get up and engage in a soothing activity such as reading a boring book until you feel sleepy     We will send a message in [employer] or call you with the results of your tests.  Further recommendations will follow based on testing results and your symptoms.   Please return to [facility] Recovery Clinic in 3 months, call [phone] or email [email or web address] if needed.    Please also consider attending  [facility] support group for long-COVID and post-ICU patients. To contact support group, email [email or web address] or call [phone]   "

## 2023-10-26 NOTE — ASSESSMENT & PLAN NOTE
"Dry mouth, freezing gait, fatigue, brain fog, poor sleep    -Home Sleep Study to rule out sleep apnea as a cause/contribution to your symptoms   -referral to [facility]  -referral to Occupational therapist Sommer Guardado for fatigue and brain fog  -additional bloodwork: repeat immunoglobulin levels, Vitamin B6, morning cortisol level (ideally before 9am) and evening cortisol level after 4pm  -shorty supplement recommendation: Ferrous Gluconate is typically well tolerated, you can take 27-38 mg elemental iron every other day. Taking this along with orange juice or a Vitamin C supplement increases absorption.   -I will discuss dry mouth and freezing gait with other providers for further guidance  -additional recommendations provided under \"Patient Education\" section     "

## 2023-10-27 DIAGNOSIS — R09.89 SINUS COMPLAINT: ICD-10-CM

## 2023-10-27 DIAGNOSIS — R68.2 DRY MOUTH: Primary | ICD-10-CM

## 2023-10-30 ENCOUNTER — LAB (OUTPATIENT)
Dept: LAB | Facility: LAB | Age: 68
End: 2023-10-30
Payer: MEDICARE

## 2023-10-30 DIAGNOSIS — U09.9 POST-ACUTE SEQUELAE OF COVID-19 (PASC): ICD-10-CM

## 2023-10-30 LAB
CORTIS AM PEAK SERPL-MSCNC: 24.5 UG/DL (ref 5–20)
IGA SERPL-MCNC: 51 MG/DL (ref 70–400)
IGG SERPL-MCNC: 501 MG/DL (ref 700–1600)
IGM SERPL-MCNC: 41 MG/DL (ref 40–230)

## 2023-10-30 PROCEDURE — 82533 TOTAL CORTISOL: CPT

## 2023-10-30 PROCEDURE — 84207 ASSAY OF VITAMIN B-6: CPT

## 2023-10-30 PROCEDURE — 36415 COLL VENOUS BLD VENIPUNCTURE: CPT

## 2023-10-30 PROCEDURE — 82784 ASSAY IGA/IGD/IGG/IGM EACH: CPT

## 2023-10-31 LAB — CORTIS AFTERNOON SERPL-MSCNC: 10.7 UG/DL (ref 2.5–10)

## 2023-11-02 DIAGNOSIS — D84.9 IMMUNE DEFICIENCY DISORDER (MULTI): Primary | ICD-10-CM

## 2023-11-02 LAB — PYRIDOXAL PHOS SERPL-SCNC: 26.7 NMOL/L (ref 20–125)

## 2023-11-07 ENCOUNTER — OFFICE VISIT (OUTPATIENT)
Dept: PRIMARY CARE | Facility: CLINIC | Age: 68
End: 2023-11-07
Payer: MEDICARE

## 2023-11-07 VITALS
HEART RATE: 97 BPM | OXYGEN SATURATION: 97 % | TEMPERATURE: 97 F | SYSTOLIC BLOOD PRESSURE: 126 MMHG | BODY MASS INDEX: 28.42 KG/M2 | WEIGHT: 163 LBS | DIASTOLIC BLOOD PRESSURE: 82 MMHG

## 2023-11-07 DIAGNOSIS — Z12.31 ENCOUNTER FOR SCREENING MAMMOGRAM FOR MALIGNANT NEOPLASM OF BREAST: ICD-10-CM

## 2023-11-07 DIAGNOSIS — U09.9 POST-ACUTE SEQUELAE OF COVID-19 (PASC): ICD-10-CM

## 2023-11-07 DIAGNOSIS — I10 BENIGN ESSENTIAL HYPERTENSION: ICD-10-CM

## 2023-11-07 DIAGNOSIS — E78.5 HYPERLIPIDEMIA, UNSPECIFIED HYPERLIPIDEMIA TYPE: ICD-10-CM

## 2023-11-07 DIAGNOSIS — E11.9 TYPE 2 DIABETES MELLITUS WITHOUT COMPLICATION, WITH LONG-TERM CURRENT USE OF INSULIN (MULTI): Chronic | ICD-10-CM

## 2023-11-07 DIAGNOSIS — Z79.4 TYPE 2 DIABETES MELLITUS WITHOUT COMPLICATION, WITH LONG-TERM CURRENT USE OF INSULIN (MULTI): Chronic | ICD-10-CM

## 2023-11-07 DIAGNOSIS — Z12.11 SCREEN FOR COLON CANCER: Primary | ICD-10-CM

## 2023-11-07 DIAGNOSIS — E03.9 HYPOTHYROIDISM, UNSPECIFIED TYPE: ICD-10-CM

## 2023-11-07 PROCEDURE — 3079F DIAST BP 80-89 MM HG: CPT | Performed by: FAMILY MEDICINE

## 2023-11-07 PROCEDURE — 1160F RVW MEDS BY RX/DR IN RCRD: CPT | Performed by: FAMILY MEDICINE

## 2023-11-07 PROCEDURE — 3074F SYST BP LT 130 MM HG: CPT | Performed by: FAMILY MEDICINE

## 2023-11-07 PROCEDURE — 1159F MED LIST DOCD IN RCRD: CPT | Performed by: FAMILY MEDICINE

## 2023-11-07 PROCEDURE — 99214 OFFICE O/P EST MOD 30 MIN: CPT | Performed by: FAMILY MEDICINE

## 2023-11-07 PROCEDURE — 4010F ACE/ARB THERAPY RXD/TAKEN: CPT | Performed by: FAMILY MEDICINE

## 2023-11-07 PROCEDURE — 1125F AMNT PAIN NOTED PAIN PRSNT: CPT | Performed by: FAMILY MEDICINE

## 2023-11-07 PROCEDURE — 1036F TOBACCO NON-USER: CPT | Performed by: FAMILY MEDICINE

## 2023-11-07 RX ORDER — BLOOD-GLUCOSE SENSOR
EACH MISCELLANEOUS
COMMUNITY
Start: 2023-10-06

## 2023-11-07 RX ORDER — LOSARTAN POTASSIUM 50 MG/1
50 TABLET ORAL DAILY
Qty: 90 TABLET | Refills: 1 | Status: SHIPPED | OUTPATIENT
Start: 2023-11-07 | End: 2024-04-17

## 2023-11-07 RX ORDER — INSULIN DEGLUDEC 100 U/ML
10 INJECTION, SOLUTION SUBCUTANEOUS NIGHTLY
COMMUNITY
End: 2024-01-31 | Stop reason: ALTCHOICE

## 2023-11-07 RX ORDER — ROSUVASTATIN CALCIUM 10 MG/1
10 TABLET, COATED ORAL NIGHTLY
Qty: 90 TABLET | Refills: 1 | Status: SHIPPED | OUTPATIENT
Start: 2023-11-07 | End: 2024-05-07 | Stop reason: SDUPTHER

## 2023-11-07 ASSESSMENT — ENCOUNTER SYMPTOMS
VOMITING: 0
FEVER: 0
COUGH: 0
CHILLS: 0
NAUSEA: 0
DYSURIA: 0
ABDOMINAL PAIN: 0
DIARRHEA: 0
SHORTNESS OF BREATH: 0

## 2023-11-07 NOTE — PROGRESS NOTES
Subjective   Patient ID: Юлия Ku is a 68 y.o. female who presents for Memory Loss (Recheck cognitive changes).    Юлия presents for follow-up of chronic conditions.  She was able to get into the long COVID clinic.  Diagnosed with complications of COVID disease.  Treatment plan in place.    She has started a new supplement which is helped significantly with her dry mouth.  Is still trying to find an oral surgeon to do a biopsy.    Blood pressures have been stable.         Review of Systems   Constitutional:  Negative for chills and fever.   Respiratory:  Negative for cough and shortness of breath.    Cardiovascular:  Negative for chest pain.   Gastrointestinal:  Negative for abdominal pain, diarrhea, nausea and vomiting.   Genitourinary:  Negative for dysuria.       Objective   /82   Pulse 97   Temp 36.1 °C (97 °F)   Wt 73.9 kg (163 lb)   SpO2 97%   BMI 28.42 kg/m²     Physical Exam  Constitutional:       General: She is not in acute distress.     Appearance: Normal appearance.   HENT:      Head: Normocephalic.      Mouth/Throat:      Mouth: Mucous membranes are moist.   Eyes:      Extraocular Movements: Extraocular movements intact.      Conjunctiva/sclera: Conjunctivae normal.   Cardiovascular:      Rate and Rhythm: Normal rate and regular rhythm.      Heart sounds: No murmur heard.  Pulmonary:      Breath sounds: No wheezing or rhonchi.   Musculoskeletal:      Cervical back: Neck supple.   Skin:     General: Skin is warm and dry.   Neurological:      Mental Status: She is alert.   Psychiatric:         Mood and Affect: Mood normal.         Behavior: Behavior normal.         Assessment/Plan   Problem List Items Addressed This Visit             ICD-10-CM    Type 2 diabetes mellitus (CMS/HCC) (Chronic) E11.9     Management per endocrinology.         Benign essential hypertension (Chronic) I10     Controlled.         Relevant Medications    losartan (Cozaar) 50 mg tablet    Other Relevant Orders     CBC and Auto Differential    Hyperlipidemia (Chronic) E78.5    Relevant Medications    rosuvastatin (Crestor) 10 mg tablet    Other Relevant Orders    Lipid Panel    Comprehensive Metabolic Panel    CBC and Auto Differential    Hypothyroidism E03.9     Following with endocrinology.          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          Other Visit Diagnoses         Codes    Screen for colon cancer    -  Primary Z12.11    Relevant Orders    Cologuard® colon cancer screening    Encounter for screening mammogram for malignant neoplasm of breast     Z12.31    Relevant Orders    BI mammo bilateral screening tomosynthesis

## 2023-11-10 RX ORDER — MONTELUKAST SODIUM 10 MG/1
1 TABLET ORAL NIGHTLY
COMMUNITY
Start: 2023-11-10 | End: 2023-11-30 | Stop reason: WASHOUT

## 2023-11-10 NOTE — ASSESSMENT & PLAN NOTE
She is having shuffling gait balance problems and brain fog. Followed up with a neurologist, MRI neck shows some cervical spondylosis but no clear cause was identified. To me her symptoms could be peripheral nerve mediated or even be signs of Parkinson's disease. I checked her orthostatics today and was positive. I am going to give a trial of midodrine for a few days to see if that has any effect on her symptoms if so we might also consider stopping losartan in future. We are checking an echo and a monitor for any arrhythmia but I really believe this is neurological in nature and that she will benefit from a second opinion. She does have some symptoms of vertigo as well although its not clear how much of that is causing her gait problem.

## 2023-11-10 NOTE — ASSESSMENT & PLAN NOTE
She had RV dysfunction without pulmonary hypertension. Cause unclear.  I suspect this was a combination of hyperthyroid state, uncontrolled blood pressure, recent Covid infection.   Her ischemic evaluation and right heart cath was negative.  Plan on repeating echocardiogram.      I attempted to intensify statins but does not seem like she will tolerate this. I advised her to at least try pravastatin every day, possibly with co-Q10.

## 2023-11-14 ENCOUNTER — OFFICE VISIT (OUTPATIENT)
Dept: CARDIOLOGY | Facility: CLINIC | Age: 68
End: 2023-11-14
Payer: MEDICARE

## 2023-11-14 ENCOUNTER — APPOINTMENT (OUTPATIENT)
Dept: ENDOCRINOLOGY | Facility: CLINIC | Age: 68
End: 2023-11-14
Payer: MEDICARE

## 2023-11-14 VITALS
DIASTOLIC BLOOD PRESSURE: 80 MMHG | HEIGHT: 63 IN | SYSTOLIC BLOOD PRESSURE: 134 MMHG | BODY MASS INDEX: 28.88 KG/M2 | HEART RATE: 84 BPM | WEIGHT: 163 LBS

## 2023-11-14 DIAGNOSIS — I95.1 ORTHOSTATIC HYPOTENSION: ICD-10-CM

## 2023-11-14 DIAGNOSIS — E78.5 HYPERLIPIDEMIA, UNSPECIFIED HYPERLIPIDEMIA TYPE: Chronic | ICD-10-CM

## 2023-11-14 DIAGNOSIS — I10 BENIGN ESSENTIAL HYPERTENSION: Primary | Chronic | ICD-10-CM

## 2023-11-14 PROCEDURE — 4010F ACE/ARB THERAPY RXD/TAKEN: CPT | Performed by: INTERNAL MEDICINE

## 2023-11-14 PROCEDURE — 1036F TOBACCO NON-USER: CPT | Performed by: INTERNAL MEDICINE

## 2023-11-14 PROCEDURE — 1125F AMNT PAIN NOTED PAIN PRSNT: CPT | Performed by: INTERNAL MEDICINE

## 2023-11-14 PROCEDURE — 1160F RVW MEDS BY RX/DR IN RCRD: CPT | Performed by: INTERNAL MEDICINE

## 2023-11-14 PROCEDURE — 99213 OFFICE O/P EST LOW 20 MIN: CPT | Performed by: INTERNAL MEDICINE

## 2023-11-14 PROCEDURE — 1159F MED LIST DOCD IN RCRD: CPT | Performed by: INTERNAL MEDICINE

## 2023-11-14 PROCEDURE — 3079F DIAST BP 80-89 MM HG: CPT | Performed by: INTERNAL MEDICINE

## 2023-11-14 PROCEDURE — 93000 ELECTROCARDIOGRAM COMPLETE: CPT | Performed by: INTERNAL MEDICINE

## 2023-11-14 PROCEDURE — 3075F SYST BP GE 130 - 139MM HG: CPT | Performed by: INTERNAL MEDICINE

## 2023-11-14 ASSESSMENT — ENCOUNTER SYMPTOMS
LOSS OF SENSATION IN FEET: 0
DEPRESSION: 0
OCCASIONAL FEELINGS OF UNSTEADINESS: 1

## 2023-11-14 NOTE — PROGRESS NOTES
Chief Complaint:   Follow-up (3 month)     History Of Present Illness:    Юлия Ku is a 68 y.o. female with history of type 2 diabetes mellitus, hypertension, hyperthyroidism, status post radioactive iodine thyroid ablation, who started having ankle swelling, jitteriness, shortness of breath after Covid infection in January 2021. She just did not feel right and went to see her endocrinologist. Both her diabetes and thyroid was out of control. Her TSH was suppressed and her hemoglobin A1c was elevated. She was started on Lantus and Trulicity with improvement of sugar levels. Her thyroid medications were cut back. She had an echo done which showed normal LV function normal diastolic filling but mild to moderate RV dysfunction. She tells me she has history of snoring but no daytime somnolence or history of apnea at night. She does not have chest pain, palpitations, lightheadedness loss of consciousness. Family history quite unremarkable.     She does not tolerate statins well as tolerate multiple statins before and now remains on crestor.     ECG shows sinus rhythm, low voltage precordial leads. We performed a exercise stress MPI. Exercise tolerance was poor. There were EKG changes without chest pain. Subsequent MPI with apical ischemia with normal LV function. She then underwent a right and left heart catheterization in 2021 that showed normal coronaries normal hemodynamics normal filling pressure and no evidence of shunt.     Recently she came in to see us for multiple issues e.g. brain fog, and balance problems. She would have a shuffling gait whenever she tries to turn and come close to falling. She sometimes gets the room spinning around when she moves her head in 1 direction. She tells me she had an extensive work-up including MRI brain MRI neck by a neurologist at [facility] who did not find any neurologic diagnosis or neurologic cause for her symptoms. Also her TSH is elevated although free T4 was  "normal.  We performed orthostatic vitals and it was positive.  Therefore we held her losartan that did not help symptoms and she tried midodrine but that also did not help her symptoms and in fact her blood pressure went up to 200.  At that point she stopped it.  She had a 24-hour Holter monitor that did not show any arrhythmia.  All this was in 2023.  Finally she went for a second opinion at a [facility] clinic and was told she has long COVID.     Last Recorded Vitals:  Vitals:    11/14/23 1419   BP: 134/80   BP Location: Left arm   Pulse: 84   Weight: 73.9 kg (163 lb)   Height: 1.6 m (5' 3\")       Past Medical History:  She has a past medical history of Personal history of irradiation, Personal history of other diseases of the circulatory system, Personal history of other endocrine, nutritional and metabolic disease, Personal history of other endocrine, nutritional and metabolic disease, Personal history of other endocrine, nutritional and metabolic disease, and Personal history of other endocrine, nutritional and metabolic disease.    Past Surgical History:  She has a past surgical history that includes Other surgical history (01/12/2021).      Social History:  She reports that she quit smoking about 45 years ago. Her smoking use included cigarettes. She has never used smokeless tobacco. She reports that she does not drink alcohol and does not use drugs.    Family History:  Family History   Problem Relation Name Age of Onset    Alzheimer's disease Mother      Hypertension Mother      Heart disease Maternal Grandfather          Allergies:  Mold, Codeine, and Lisinopril    Outpatient Medications:  Current Outpatient Medications   Medication Instructions    carbidopa-levodopa (Parcopa)  mg disintegrating tablet 0.5 tablets, oral, 3 times daily    FreeStyle Srinivas 3 Sensor device USE AS DIRECTED TO CHECK BLOOD GLUCOSE. CHANGE EVERY 14 DAYS    levothyroxine (SYNTHROID, LEVOXYL) 100 mcg, oral, Daily    losartan " (COZAAR) 50 mg, oral, Daily    metFORMIN XR (GLUCOPHAGE-XR) 500 mg, oral, Daily with evening meal    montelukast (Singulair) 10 mg tablet 1 tablet, oral, Nightly    NON FORMULARY Glia Plasmalogens    omega-3/dha/epa/fish oil (OMEGA-3 ORAL) Super Omega 3 Epa/Dha 1000 MG oral capsules<BR>OTC    pen needle, diabetic (BD ULTRA-FINE RUPAL PEN NEEDLE MISC) 2 times daily, Use with insulin (90 day supply)<BR>BD Pen Needle/Rupal/Ultra-fine/32G X 4MM    rosuvastatin (CRESTOR) 10 mg, oral, Nightly    Tresiba FlexTouch U-100 10 Units, subcutaneous, Nightly    vitamin B comp and vit C no.6 (VITAMIN B COMP WITH VIT C NO.6 ORAL) oral, Vitamin B 6 gummies       Physical Exam:  Physical Exam  Vitals reviewed.   Constitutional:       Appearance: Normal appearance.   Neck:      Vascular: No carotid bruit or JVD.   Cardiovascular:      Rate and Rhythm: Normal rate and regular rhythm.      Pulses: Normal pulses.      Heart sounds: Normal heart sounds, S1 normal and S2 normal.   Pulmonary:      Effort: Pulmonary effort is normal. No respiratory distress.      Breath sounds: No wheezing or rales.   Abdominal:      General: Abdomen is flat.      Palpations: Abdomen is soft.   Musculoskeletal:      Right lower leg: No edema.      Left lower leg: No edema.   Skin:     General: Skin is warm.   Neurological:      Mental Status: She is alert and oriented to person, place, and time. Mental status is at baseline.   Psychiatric:         Mood and Affect: Mood normal.         Behavior: Behavior normal.           Last Labs:  CBC -  Lab Results   Component Value Date    WBC 5.8 09/12/2023    HGB 13.5 09/12/2023    HCT 42.3 09/12/2023    MCV 93 09/12/2023     09/12/2023       CMP -  Lab Results   Component Value Date    CALCIUM 9.6 04/03/2023    PROT 6.6 04/03/2023    ALBUMIN 4.6 04/03/2023    AST 13 04/03/2023    ALT 11 04/03/2023    ALKPHOS 43 04/03/2023    BILITOT 0.8 04/03/2023       LIPID PANEL -   Lab Results   Component Value Date    CHOL  126 04/03/2023    TRIG 88 04/03/2023    HDL 61.3 04/03/2023    CHHDL 2.1 04/03/2023    LDLF 47 04/03/2023    VLDL 18 04/03/2023       RENAL FUNCTION PANEL -   Lab Results   Component Value Date    GLUCOSE 102 (H) 04/03/2023     04/03/2023    K 4.3 04/03/2023     04/03/2023    CO2 29 04/03/2023    ANIONGAP 11 04/03/2023    BUN 13 04/03/2023    CREATININE 0.76 04/03/2023    CALCIUM 9.6 04/03/2023    ALBUMIN 4.6 04/03/2023        Lab Results   Component Value Date    BNP 53 06/22/2021    HGBA1C 6.2 (H) 06/15/2023       Assessment/Plan   Problem List Items Addressed This Visit             ICD-10-CM    Benign essential hypertension - Primary (Chronic) I10     She had RV dysfunction without pulmonary hypertension. Cause unclear.  I suspect this was a combination of hyperthyroid state, uncontrolled blood pressure, recent Covid infection.   Her ischemic evaluation and right heart cath was negative.  Plan on repeating echocardiogram.      I attempted to intensify statins but does not seem like she will tolerate this. I advised her to at least try pravastatin every day, possibly with co-Q10.         Relevant Orders    ECG 12 lead (Clinic Performed)    Hyperlipidemia (Chronic) E78.5     With diabetes remains on a statin.         Orthostatic hypotension I95.1     She had mild orthostatic hypotension but her symptoms did not improve with holding losartan or with midodrine.  At this time she has a diagnosis of long COVID.  Advised to follow-up with PCP.              Sal Funez MD

## 2023-11-14 NOTE — ASSESSMENT & PLAN NOTE
She had mild orthostatic hypotension but her symptoms did not improve with holding losartan or with midodrine.  At this time she has a diagnosis of long COVID.  Advised to follow-up with PCP.

## 2023-11-15 ENCOUNTER — CLINICAL SUPPORT (OUTPATIENT)
Dept: SLEEP MEDICINE | Facility: CLINIC | Age: 68
End: 2023-11-15
Payer: MEDICARE

## 2023-11-15 DIAGNOSIS — G47.9 SLEEP DISORDER, UNSPECIFIED: ICD-10-CM

## 2023-11-15 DIAGNOSIS — G93.31 POSTVIRAL FATIGUE SYNDROME: ICD-10-CM

## 2023-11-15 DIAGNOSIS — U09.9 POST-ACUTE SEQUELAE OF COVID-19 (PASC): ICD-10-CM

## 2023-11-15 PROCEDURE — 95806 SLEEP STUDY UNATT&RESP EFFT: CPT | Mod: PO | Performed by: INTERNAL MEDICINE

## 2023-11-15 NOTE — PROGRESS NOTES
Type of Study: HOME SLEEP STUDY - NOMAD     The patient received equipment and instructions for use of the [employer] Nomad HSAT device. The patient was instructed how to apply the effort belts, cannula, thermistor. It was also explained how the Nomad and oximeter components work.  The patient was asked to record their sleep for an 8-hour period.     The patient was informed of their responsibility for the device and acknowledged this by signing the HSAT device contract. The patient was asked to return the device on 11/16/2023 by 10 AM to the Respiratory Care Department at [facility].     The patient was instructed to call 911 as usual for any medical- emergencies while at home.  The patient was also given a phone number for troubleshooting when using the device in case there were additional questions.

## 2023-11-23 LAB — NONINV COLON CA DNA+OCC BLD SCRN STL QL: NEGATIVE

## 2023-11-28 ENCOUNTER — TELEPHONE (OUTPATIENT)
Dept: PRIMARY CARE | Facility: CLINIC | Age: 68
End: 2023-11-28
Payer: MEDICARE

## 2023-11-28 NOTE — PROGRESS NOTES
History of Present Illness    Юлия Ku is a 68 y.o. female who is referred for a possible minor salivary gland biopsy.  This patient has had all sorts of issues after getting COVID approximately a year ago.  She has had some significant issues with dryness in her mouth.  She has had some blood test that have failed to reveal the cause of this issue.  She is here today to undergo a minor salivary gland biopsy.      Past Medical History    Past medical history is mainly related to the post COVID syndrome issues.  Her medications are documented in the chart.  She does have allergy to codeine.  She gets a rash.  She does not smoke or drink.  She is a .  She is here today with her .    Physical Exam    The patient is alert and oriented. Examination of the external ears, ear canals, and eardrums, is within normal limits. Examination of the anterior and external nose is negative. Examination of the oral cavity and oropharynx is normal. There is no evidence of any mucosal lesions. There is good mobility of the tongue and palate.  Interestingly she does not look that dry today.  There is good mandibular excursion. Palpation of the parotid, neck, and thyroid field fails to show any worrisome masses or adenopathies.    After verbal consent and under Xylocaine 1% to 100,000 epinephrine an incision was made in the right the lower lip.  Salivary glands were harvested.  The area was closed with 5-0 Vicryl sutures.    Assessment and Plan    Xerostomia status post COVID.  Minor salivary gland biopsies were obtained today.  The results should be available within the next 2 weeks or so.  She was instructed in the care of the wound.  She is to call us if she has any issues.    I will see her on a as needed basis.

## 2023-11-28 NOTE — TELEPHONE ENCOUNTER
----- Message from Rivera Sanford DO sent at 11/27/2023  8:36 PM EST -----  Please let patient know the following:  Cologaurd is normal, repeat in 3 years

## 2023-11-29 ENCOUNTER — HOSPITAL ENCOUNTER (OUTPATIENT)
Dept: RADIOLOGY | Facility: HOSPITAL | Age: 68
Discharge: HOME | End: 2023-11-29
Payer: MEDICARE

## 2023-11-29 ENCOUNTER — OFFICE VISIT (OUTPATIENT)
Dept: OTOLARYNGOLOGY | Facility: CLINIC | Age: 68
End: 2023-11-29
Payer: MEDICARE

## 2023-11-29 VITALS — BODY MASS INDEX: 29.14 KG/M2 | WEIGHT: 164.5 LBS | TEMPERATURE: 97 F

## 2023-11-29 DIAGNOSIS — R68.2 DRY MOUTH: ICD-10-CM

## 2023-11-29 DIAGNOSIS — K11.7 CLINICAL XEROSTOMIA: Primary | ICD-10-CM

## 2023-11-29 DIAGNOSIS — Z12.31 ENCOUNTER FOR SCREENING MAMMOGRAM FOR MALIGNANT NEOPLASM OF BREAST: ICD-10-CM

## 2023-11-29 DIAGNOSIS — U09.9 POST-ACUTE SEQUELAE OF COVID-19 (PASC): ICD-10-CM

## 2023-11-29 PROCEDURE — 88305 TISSUE EXAM BY PATHOLOGIST: CPT | Performed by: DENTIST

## 2023-11-29 PROCEDURE — 1159F MED LIST DOCD IN RCRD: CPT | Performed by: OTOLARYNGOLOGY

## 2023-11-29 PROCEDURE — 1036F TOBACCO NON-USER: CPT | Performed by: OTOLARYNGOLOGY

## 2023-11-29 PROCEDURE — 77067 SCR MAMMO BI INCL CAD: CPT

## 2023-11-29 PROCEDURE — 1125F AMNT PAIN NOTED PAIN PRSNT: CPT | Performed by: OTOLARYNGOLOGY

## 2023-11-29 PROCEDURE — 77063 BREAST TOMOSYNTHESIS BI: CPT | Performed by: RADIOLOGY

## 2023-11-29 PROCEDURE — 88305 TISSUE EXAM BY PATHOLOGIST: CPT

## 2023-11-29 PROCEDURE — 77067 SCR MAMMO BI INCL CAD: CPT | Performed by: RADIOLOGY

## 2023-11-29 PROCEDURE — 42405 BIOPSY OF SALIVARY GLAND: CPT | Performed by: OTOLARYNGOLOGY

## 2023-11-29 PROCEDURE — 4010F ACE/ARB THERAPY RXD/TAKEN: CPT | Performed by: OTOLARYNGOLOGY

## 2023-11-29 PROCEDURE — 99203 OFFICE O/P NEW LOW 30 MIN: CPT | Performed by: OTOLARYNGOLOGY

## 2023-11-29 PROCEDURE — 1160F RVW MEDS BY RX/DR IN RCRD: CPT | Performed by: OTOLARYNGOLOGY

## 2023-11-29 RX ORDER — SAME BUTANEDISULFONATE/BETAINE 400-600 MG
POWDER IN PACKET (EA) ORAL
COMMUNITY

## 2023-11-29 ASSESSMENT — PATIENT HEALTH QUESTIONNAIRE - PHQ9: 2. FEELING DOWN, DEPRESSED OR HOPELESS: NOT AT ALL

## 2023-11-29 NOTE — ASSESSMENT & PLAN NOTE
"11/2023: dry mouth, freezing gait, fatigue, brain fog  -continue supplements that have been helpful with dry mouth  -follow-up with neurology and recommended treatments, glad that moving up medication timing has helped control freezing gait symptoms  -I will reach out to Sommer Guardado to get you scheduled with Occupational Therapy  -referral to Endocrinologist Dr. Castro per your request  -follow-up with Sherrill Fall as scheduled      10/2023: Dry mouth, freezing gait, fatigue, brain fog, poor sleep  -Home Sleep Study to rule out sleep apnea as a cause/contribution to your symptoms   -referral to Long COVID Psychology  -referral to Occupational therapist Sommer Guardado for fatigue and brain fog  -additional bloodwork: repeat immunoglobulin levels, Vitamin B6, morning cortisol level (ideally before 9am) and evening cortisol level after 4pm  -shorty supplement recommendation: Ferrous Gluconate is typically well tolerated, you can take 27-38 mg elemental iron every other day. Taking this along with orange juice or a Vitamin C supplement increases absorption.   -I will discuss dry mouth and freezing gait with other providers for further guidance  -additional recommendations provided under \"Patient Education\" section   -> immunology for low IGG and IGA, continue follow-up with Dr. Mata and neuroPT, lip biopsy with ENT    "

## 2023-11-29 NOTE — PROGRESS NOTES
NPV In-Person    Subjective   COVID-19 Infection Date:  01/2021  PCR confirmed (sx: Fatigue, Loss or disturbed taste, Brain fog Fatigue, Loss or disturbed taste, Brain fog  - no hospitalization, no treatment)    COVID-19 vaccine status: Pfizer 03/30/21, 04/20/21, 10/29/21, 09/16/22, 09/20/23    Occupation:     Current Providers: PCP Dr. Dupree, Endocrinology Dr. Ingram, Neurology Movement Dr. Ibarra, Neurology MS Dr. Zavala,  Rheumatology Dr. Obrien, Cardiology Dr. Fuenz    Survey Scores: 10/2023  PHQ-9: 5  MARGO-7: 0  Sleep Wellness: 11 snores  FSS average: 4.56  Modified Ecog average: 1.58   MOCA: 17/22 (10/2023)  Overall Health: 45    68 y.o. female with a h/o COVID-19 in January 2021, DM2, HTN, HLD, Grave's disease s/p ablation now on thyroid replacement, CAD, [facility] with c/o dry mouth, freezing gait, fatigue, brain fog    Had lip biopsy yesterday  Some changes in dry mouth, was improved before she saw ENT, dry mouth returned after biopsy yesterday, comes and goes  Started gliaplasmallgems (8 per day) recommended by naturopath in [facility]  Was sent a blood test kit to have plasmagen  Freezing gait was bad yesterday, wrote to neurologist if she can take medicine an hour earlier  Starts tripping an hour before her next dose is due  Starting today to take medications an hour early  Looking to see endocrinology to address thyroid levels, recently stopped taking synthroid  She thinks that she needs a beta blocker to stop pituitary gland from producing   Stopped beta blocker 2 years ago and then symptoms started  Was connected with  Psychologist  Decided not to go to PT anymore, better to go on stationary bike every day  Has to go in the afternoon  In the morning has to lay down and sleep  Has been sleeping very well since taking supplements, feels somewhat refreshed in the morning but then gets fatigued and brain fogged in the morning    Relevant  prior healthcare visits:  -11/2023 Cardiology for RV dysfunction without pulmonary HTN, ischemic evaluation and RHC was negative, plans to repeat echocardiogram. Mild orthostatic hypotension in the office but symptoms did not improve with holding losartan or with midodrine  -11/2023 PT [facility] notes improvement in walking turns with no evidence of freezing of gait when provided cue for PWR step, improved initiation of gait upon standing with staggered base of support  -10/2023 Neuro PT at [facility] notes impairment in balance, coordination, gait,  posture  -10/2023 Neurology [facility] for primary freezing of gait, trial Sinemet, repeat PT, may consider amantadine in the future, consider DaTscan if mediations prove ineffective, recommends to increase caloric intake  -09/2023 Rheumatology [facility] for evaluation of CTD, notes that Sjogren's test negative, lip biopsy was advised but not done, notes patient is interested in Paxlovid treatment for PASC, plan to observe  -09/2023 PCP referred to neuropsychology for worsening brain fog, consult to Rheumatology for gait changes  -09/2023 SLP [facility] for cognitive deficits and communication deficits  -08/2023 OT notes improvement in overal executive functioning and L hand FMC, however slight decline in bilateral hand  strength and L gross shoulder strength    Relevant prior diagnostic studies:  -11/2023 HSAT normal  -09/2023 echocardiogram   -03/2023 MRI brain unremarkable, MRI C-spine with cervical spondylosis    Relevant prior laboratory values, unremarkable unless noted:  -11/2023 TSH  -10/2023 pm cortisol and am cortisol slightly elevatd, IgG and IgA low, Vitamin B6  -09/2023 Ferritin 19, iron studies low, CBC/D, Copper, Vitamin E, Folate, TSH  -08/2023 Tetanus Ab, Diphteria Ab, Strep Pneumo Ab, Lyme Ab  -07/2023 Esr, Vitamin A, MONALISA, RF, Thyroxine  -06/2023 Tryptase, Allergy respiratory panel abnormal, thyroid perox, thyroglobulin, Ttransglutaminase, IGA low, HbA1c 6.2%, IGG low, beta 2  glycoprotein, cardiolipin ab  -06/2023 cryoglobulin  -04/2023 Heptatis C ab, MONALISA, AChR bidning Ab, Vitamin D, Vitamin B12, Anti-SSB and SSA    Exercise routine:  Diet:  Weight hx: pre-COVID-19 201 lbs -> post-COVID-19 157 lbs  Substance use: no tobacco use hx  Social:       Current Outpatient Medications:     carbidopa-levodopa (Parcopa)  mg disintegrating tablet, Take 0.5 tablets by mouth 3 times a day., Disp: , Rfl:     cholecalciferol, vitD3,/vit K2 (vitamin D3-vitamin K2) 250 mcg (10,000 unit)-45 mcg capsule, Take by mouth., Disp: , Rfl:     FreeStyle Srinivas 3 Sensor device, USE AS DIRECTED TO CHECK BLOOD GLUCOSE. CHANGE EVERY 14 DAYS, Disp: , Rfl:     L.acid/L.casei/B.bif/B.ciaran/FOS (PROBIOTIC BLEND ORAL), Take by mouth., Disp: , Rfl:     levothyroxine (Synthroid, Levoxyl) 100 mcg tablet, Take 1 tablet (100 mcg) by mouth once daily., Disp: 90 tablet, Rfl: 1    losartan (Cozaar) 50 mg tablet, Take 1 tablet (50 mg) by mouth once daily., Disp: 90 tablet, Rfl: 1    metFORMIN XR (Glucophage-XR) 500 mg 24 hr tablet, Take 1 tablet (500 mg) by mouth once daily in the evening. Take with meals., Disp: , Rfl:     NON FORMULARY, Glia Plasmalogens, Disp: , Rfl:     NON FORMULARY, Take 2 each by mouth once daily. Ferrasorb, Disp: , Rfl:     omega-3 fatty acids (SUPER OMEGA-3 ORAL), Take by mouth., Disp: , Rfl:     omega-3/dha/epa/fish oil (OMEGA-3 ORAL), Super Omega 3 Epa/Dha 1000 MG oral capsules OTC, Disp: , Rfl:     pen needle, diabetic (BD ULTRA-FINE SHU PEN NEEDLE MISC), 2 times a day. Use with insulin (90 day supply) BD Pen Needle/Shu/Ultra-fine/32G X 4MM, Disp: , Rfl:     rosuvastatin (Crestor) 10 mg tablet, Take 1 tablet (10 mg) by mouth once daily at bedtime., Disp: 90 tablet, Rfl: 1    Tresiba FlexTouch U-100 100 unit/mL (3 mL) injection, Inject 10 Units under the skin once daily at bedtime., Disp: , Rfl:     vitamin B comp and vit C no.6 (VITAMIN B COMP WITH VIT C NO.6 ORAL), Take by mouth. Vitamin B  "6 gummies, Disp: , Rfl:     Past Medical History:   Diagnosis Date    Personal history of irradiation     Status post radioactive iodine thyroid ablation    Personal history of other diseases of the circulatory system     History of hypertension    Personal history of other endocrine, nutritional and metabolic disease     History of type 2 diabetes mellitus    Personal history of other endocrine, nutritional and metabolic disease     History of Graves' disease    Personal history of other endocrine, nutritional and metabolic disease     History of hyperlipidemia    Personal history of other endocrine, nutritional and metabolic disease     History of hypothyroidism       Past Surgical History:   Procedure Laterality Date    OTHER SURGICAL HISTORY  01/12/2021    No history of surgery       Family History   Problem Relation Name Age of Onset    Alzheimer's disease Mother      Hypertension Mother      Heart disease Maternal Grandfather         Objective   /67 (BP Location: Right arm, Patient Position: Sitting, BP Cuff Size: Adult)   Pulse 72   Temp 36.8 °C (98.2 °F) (Temporal)   Ht 1.613 m (5' 3.5\")   Wt 74.8 kg (165 lb)   SpO2 96%   BMI 28.77 kg/m²     Physical Exam  Vitals reviewed.   Constitutional:       Appearance: Normal appearance.   HENT:      Mouth/Throat:      Mouth: Mucous membranes are moist.      Pharynx: Posterior oropharyngeal erythema: erythema at lip biopsy site.   Eyes:      Conjunctiva/sclera: Conjunctivae normal.   Cardiovascular:      Rate and Rhythm: Normal rate and regular rhythm.      Heart sounds: Normal heart sounds.   Pulmonary:      Effort: Pulmonary effort is normal.      Breath sounds: Normal breath sounds.   Abdominal:      General: Bowel sounds are normal.      Palpations: Abdomen is soft.   Musculoskeletal:         General: Normal range of motion.      Cervical back: Neck supple.   Skin:     General: Skin is warm and dry.   Neurological:      General: No focal deficit " "present.      Gait: Gait abnormal.   Psychiatric:         Mood and Affect: Mood normal.         Cognition and Memory: Cognition normal.         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 Castro per your request  -follow-up with Sherrill Fall as scheduled      10/2023: Dry mouth, freezing gait, fatigue, brain fog, poor sleep  -Home Sleep Study to rule out sleep apnea as a cause/contribution to your symptoms   -referral to Long COVID Psychology  -referral to Occupational therapist Sommer Guardado for fatigue and brain fog  -additional bloodwork: repeat immunoglobulin levels, Vitamin B6, morning cortisol level (ideally before 9am) and evening cortisol level after 4pm  -shorty supplement recommendation: Ferrous Gluconate is typically well tolerated, you can take 27-38 mg elemental iron every other day. Taking this along with orange juice or a Vitamin C supplement increases absorption.   -I will discuss dry mouth and freezing gait with other providers for further guidance  -additional recommendations provided under \"Patient Education\" section   -> immunology for low IGG and IGA, continue follow-up with Dr. 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

## 2023-11-30 ENCOUNTER — OFFICE VISIT (OUTPATIENT)
Dept: OTHER | Facility: CLINIC | Age: 68
End: 2023-11-30
Payer: MEDICARE

## 2023-11-30 VITALS
TEMPERATURE: 98.2 F | BODY MASS INDEX: 28.17 KG/M2 | DIASTOLIC BLOOD PRESSURE: 67 MMHG | WEIGHT: 165 LBS | HEART RATE: 72 BPM | OXYGEN SATURATION: 96 % | SYSTOLIC BLOOD PRESSURE: 104 MMHG | HEIGHT: 64 IN

## 2023-11-30 DIAGNOSIS — E89.0 POSTABLATIVE HYPOTHYROIDISM: Chronic | ICD-10-CM

## 2023-11-30 DIAGNOSIS — U09.9 POST-ACUTE SEQUELAE OF COVID-19 (PASC): Primary | ICD-10-CM

## 2023-11-30 DIAGNOSIS — E66.9 OBESITY, UNSPECIFIED CLASSIFICATION, UNSPECIFIED OBESITY TYPE, UNSPECIFIED WHETHER SERIOUS COMORBIDITY PRESENT: ICD-10-CM

## 2023-11-30 PROCEDURE — 1160F RVW MEDS BY RX/DR IN RCRD: CPT | Performed by: NURSE PRACTITIONER

## 2023-11-30 PROCEDURE — 4010F ACE/ARB THERAPY RXD/TAKEN: CPT | Performed by: NURSE PRACTITIONER

## 2023-11-30 PROCEDURE — 99215 OFFICE O/P EST HI 40 MIN: CPT | Mod: ZK | Performed by: NURSE PRACTITIONER

## 2023-11-30 PROCEDURE — 99215 OFFICE O/P EST HI 40 MIN: CPT | Performed by: NURSE PRACTITIONER

## 2023-11-30 PROCEDURE — 3078F DIAST BP <80 MM HG: CPT | Performed by: NURSE PRACTITIONER

## 2023-11-30 PROCEDURE — 1036F TOBACCO NON-USER: CPT | Performed by: NURSE PRACTITIONER

## 2023-11-30 PROCEDURE — 3074F SYST BP LT 130 MM HG: CPT | Performed by: NURSE PRACTITIONER

## 2023-11-30 PROCEDURE — 1159F MED LIST DOCD IN RCRD: CPT | Performed by: NURSE PRACTITIONER

## 2023-11-30 PROCEDURE — 1126F AMNT PAIN NOTED NONE PRSNT: CPT | Performed by: NURSE PRACTITIONER

## 2023-11-30 ASSESSMENT — PAIN SCALES - GENERAL: PAINLEVEL: 0-NO PAIN

## 2023-11-30 NOTE — PATIENT INSTRUCTIONS
It was my pleasure seeing you in the [facility] today.  We will focus on addressing the following symptoms discussed today: dry mouth, freezing gait, fatigue, brain fog    My recommendations are as follows:  -continue supplements that have been helpful with dry mouth  -follow-up with neurology and recommended treatments, glad that moving up medication timing has helped control freezing gait symptoms  -I will reach out to Sommer Guardado to get you scheduled with Occupational Therapy  -referral to Endocrinologist Dr. Castro per your request  -follow-up with Sherrill Fall as scheduled    Please return to [facility] in 3 months, call [phone] or send a message through your [employer] maty if needed.    Please also consider attending  [facility] support group for long-COVID and post-ICU patients. To contact support group, email [email or web address] or call [phone]

## 2023-12-04 ENCOUNTER — SOCIAL WORK (OUTPATIENT)
Dept: BEHAVIORAL HEALTH | Facility: CLINIC | Age: 68
End: 2023-12-04
Payer: MEDICARE

## 2023-12-04 DIAGNOSIS — U09.9 POST-ACUTE SEQUELAE OF COVID-19 (PASC): ICD-10-CM

## 2023-12-05 DIAGNOSIS — E89.0 POSTABLATIVE HYPOTHYROIDISM: Primary | ICD-10-CM

## 2023-12-11 LAB
LABORATORY COMMENT REPORT: NORMAL
PATH REPORT.FINAL DX SPEC: NORMAL
PATH REPORT.GROSS SPEC: NORMAL
PATH REPORT.RELEVANT HX SPEC: NORMAL
PATH REPORT.TOTAL CANCER: NORMAL

## 2023-12-12 ENCOUNTER — EVALUATION (OUTPATIENT)
Dept: OCCUPATIONAL THERAPY | Facility: HOSPITAL | Age: 68
End: 2023-12-12
Payer: MEDICARE

## 2023-12-12 DIAGNOSIS — G93.31 POSTVIRAL FATIGUE SYNDROME: ICD-10-CM

## 2023-12-12 DIAGNOSIS — R41.89 COGNITIVE CHANGES: Primary | ICD-10-CM

## 2023-12-12 DIAGNOSIS — U09.9 POST-ACUTE SEQUELAE OF COVID-19 (PASC): ICD-10-CM

## 2023-12-12 DIAGNOSIS — R41.89 BRAIN FOG: ICD-10-CM

## 2023-12-12 PROBLEM — R53.83 FATIGUE: Status: ACTIVE | Noted: 2023-12-12

## 2023-12-12 PROCEDURE — 97535 SELF CARE MNGMENT TRAINING: CPT | Mod: GO | Performed by: OCCUPATIONAL THERAPIST

## 2023-12-12 PROCEDURE — 97167 OT EVAL HIGH COMPLEX 60 MIN: CPT | Mod: GO | Performed by: OCCUPATIONAL THERAPIST

## 2023-12-12 ASSESSMENT — PAIN SCALES - GENERAL: PAINLEVEL_OUTOF10: 0 - NO PAIN

## 2023-12-12 ASSESSMENT — ENCOUNTER SYMPTOMS
OCCASIONAL FEELINGS OF UNSTEADINESS: 1
DEPRESSION: 0
LOSS OF SENSATION IN FEET: 0

## 2023-12-12 ASSESSMENT — ACTIVITIES OF DAILY LIVING (ADL)
BATHING_ASSISTANCE: INDEPENDENT
HOME_MANAGEMENT_TIME_ENTRY: 11

## 2023-12-12 ASSESSMENT — PATIENT HEALTH QUESTIONNAIRE - PHQ9
2. FEELING DOWN, DEPRESSED OR HOPELESS: NOT AT ALL
1. LITTLE INTEREST OR PLEASURE IN DOING THINGS: NOT AT ALL
SUM OF ALL RESPONSES TO PHQ9 QUESTIONS 1 AND 2: 0

## 2023-12-12 ASSESSMENT — PAIN - FUNCTIONAL ASSESSMENT: PAIN_FUNCTIONAL_ASSESSMENT: 0-10

## 2023-12-12 NOTE — Clinical Note
December 17, 2023    Tammie Guardado OT  [address]  Department Of Rehabilitation Services  [facility] [address]    Patient: Юлия Ku   YOB: 1955   Date of Visit: 12/12/2023       Dear Amandeep Ramos  [address]  [facility] [address]  [place] [id number]    The attached plan of care is being sent to you because your patient’s medical reimbursement requires that you certify the plan of care. Your signature is required to allow uninterrupted insurance coverage.      You may indicate your approval by signing below and faxing this form back to us at Dept Fax: [phone].    Please call Dept: [phone] with any questions or concerns.    Thank you for this referral,        Tammie Guardado OT  [facility]  [address]  [facility] [id number] [phone]    Payer: Payor: MEDICARE / Plan: MEDICARE PART A AND B / Product Type: *No Product type* /                                                                         Date:     Dear Tammie Guardado OT,     Re: Ms. Юлия Ku, MRN:[id number]    I certify that I have reviewed the attached plan of care and it is medically necessary for Ms. Юлия Ku (1955) who is under my care.          ______________________________________                    _________________  Provider name and credentials                                           Date and time                                                                                           Plan of Care 12/12/23   Effective from: 12/12/2023  Effective to: 3/7/2024    Plan ID: [id number]            Participants as of Finalize on 12/17/2023    Name Type Comments Contact Info    AMANDEEP Ramos Nurse Practitioner  [phone]    Tammie Guardado OT Occupational Therapist  [phone]       Last Plan Note     Author: Tammie Guardado OT Status: Signed Last edited: 12/12/2023 12:00 PM       Occupational  Therapy    Occupational Therapy Evaluation    Name: Юлия Ku  MRN: [id number]  : 1955  Date: 23  Time Calculation  Stop Time: 1307    Assessment:  Patient is a 68 year old female referred to OT by COVID clinic.  Patient presents with significant fatigue and cognitive deficits affecting ADL, IADL.  She would benefit from skilled OT services for ADL retraining, IADL retraining, fatigue management,      Plan:  Outpatient Plan  OT Plan: IADL retraining, ADL retraining, cognitive skills training, instruciton on compensatory tools/techniques/strategies, fatigue management  Frequency: 1x/week  Duration: 12 weeks    Subjective   Current Problem:  1. Cognitive changes  Follow Up In Occupational Therapy      2. Post-acute sequelae of COVID-19 (PASC)  Referral to Occupational Therapy    Follow Up In Occupational Therapy      3. Brain fog  Referral to Occupational Therapy    Follow Up In Occupational Therapy      4. Postviral fatigue syndrome  Referral to Occupational Therapy    Follow Up In Occupational Therapy      Patient reports loss of taste. Uses gum and mouthwash to help.  General:   OT Received On: 23  General  Referred By: Anila Abad NP  Past Medical History Relevant to Rehab: DM II, Graves Disease  Family/Caregiver Present: Yes ()  Precautions:  Precautions  STEADI Fall Risk Score (The score of 4 or more indicates an increased risk of falling): 9  Hearing/Visual Limitations: WFL  Medical Precautions: Fall precautions       Pain Assessment:  Pain Assessment  Pain Assessment: 0-10  Pain Score: 0 - No pain    Objective   Cognition:  Cognition  Overall Cognitive Status: Impaired  Attention: Exceptions to WFL  Alternating Attention: Impaired  Divided Attention: Impaired  Selective Attention: Impaired  Sustained Attention: Impaired  Short-Term Memory: Impaired  Working Memory: Impaired  Problem Solving: Exceptions to WFL  Complex Functional Tasks: Impaired  Managing Finances: Impaired  (reports worrry about doin her taxes)  Processing Speed: Delayed       Home Living:  Home Living  Type of Home: House  Lives With: Spouse  Home Adaptive Equipment: Walker rolling or standard  Home Layout: Two level  Bathroom Shower/Tub: Walk-in shower  Bathroom Equipment: Shower chair without back, Grab bars in shower  Prior Function Per Pt/Caregiver Report:  Prior Function Per Pt/Caregiver Report  Level of [place]: Independent with ADLs and functional transfers, Independent with homemaking with ambulation (Patient retired June 30, 2023.)  Hand Dominance: Right  IADL History:  Mode of Transportation:  (Patient dependent on [employer] for driving.)  ADL/IADL:  Eating Assistance: Independent  Grooming Assistance: Independent  Bathing Assistance: Independent (sits to shower)  UE Dressing Assistance: Independent  LE Dressing Assistance: Independent  Toileting Assistance with Device: IndependentIADL:  Patient feels like she has to lay down after eating in am from 9-2. Up from 2-9 but unable to function during that time. She sometimes requires a nap.  Patient with significant fatigue with self care.     currently completing IADL tasks due to significant fatigue.     Activity Tolerance:poor     Mobility/Transfer:     Patient with freezing gait, taking dopamine to manage.  Prior to starting dopamine, patient  feel 2x/a week, Now falls are down to 1x/month.     Freezing gait happens when turning in tight spaces, doorways    Sensation:  Light Touch:  (Patient denies deficits.)    Coordination:  Coordination Comment: writing decreased since COVID, patient reports practice does not help  Hand Function:  Coordination:  (9hpt right 25.43 sec, left 29 sec)         OP EDUCATION:   Provided instruction on fatigue scale, fatigue log, role of OT,    Goals:  Active       OT Problem       OT-Patient will manage fatigue independently through use of fatigue traffic light and other fatigue strategies to allow participation in  ADL/IADL/leisure with fatigue rating of 4/10(yellow) or less.        Start:  12/12/23    Expected End:  03/07/24            Patient will complete complex IADL independently using cognitive and fatigue management strategies        Start:  12/12/23    Expected End:  03/07/24            OT- Patient will use memory and planning system independently as a compensatory tool for management of day, memory and planning.        Start:  12/12/23    Expected End:  03/07/24            Patient will use memory strategies independently to allow improved ability to manage appointments, remember errands,and to improve working memory.       Start:  12/12/23    Expected End:  03/07/24            OT-Patient will demonstrate good recall and understanding of written material through use of PQRST and other reading strategies.        Start:  12/12/23    Expected End:  03/07/24                         Current Participants as of 12/17/2023    Name Type Comments Contact Info    AMANDEEP Ramos Nurse Practitioner  [phone]    Signature pending    Tammie Guardado OT Occupational Therapist  [phone]    Signature pending

## 2023-12-12 NOTE — PROGRESS NOTES
Occupational Therapy    Occupational Therapy Evaluation    Name: Юлия Ku  MRN: [id number]  : 1955  Date: 23  Time Calculation  Stop Time: 1307    Assessment:  Patient is a 68 year old female referred to OT by COVID clinic.  Patient presents with significant fatigue and cognitive deficits affecting ADL, IADL.  She would benefit from skilled OT services for ADL retraining, IADL retraining, fatigue management,      Plan:  Outpatient Plan  OT Plan: IADL retraining, ADL retraining, cognitive skills training, instruciton on compensatory tools/techniques/strategies, fatigue management  Frequency: 1x/week  Duration: 12 weeks    Subjective   Current Problem:  1. Cognitive changes  Follow Up In Occupational Therapy      2. Post-acute sequelae of COVID-19 (PASC)  Referral to Occupational Therapy    Follow Up In Occupational Therapy      3. Brain fog  Referral to Occupational Therapy    Follow Up In Occupational Therapy      4. Postviral fatigue syndrome  Referral to Occupational Therapy    Follow Up In Occupational Therapy      Patient reports loss of taste. Uses gum and mouthwash to help.  General:   OT Received On: 23  General  Referred By: Anila Abad NP  Past Medical History Relevant to Rehab: DM II, Graves Disease  Family/Caregiver Present: Yes ()  Precautions:  Precautions  STEADI Fall Risk Score (The score of 4 or more indicates an increased risk of falling): 9  Hearing/Visual Limitations: WFL  Medical Precautions: Fall precautions       Pain Assessment:  Pain Assessment  Pain Assessment: 0-10  Pain Score: 0 - No pain    Objective   Cognition:  Cognition  Overall Cognitive Status: Impaired  Attention: Exceptions to WFL  Alternating Attention: Impaired  Divided Attention: Impaired  Selective Attention: Impaired  Sustained Attention: Impaired  Short-Term Memory: Impaired  Working Memory: Impaired  Problem Solving: Exceptions to WFL  Complex Functional Tasks: Impaired  Managing  Finances: Impaired (reports worrry about doin her taxes)  Processing Speed: Delayed       Home Living:  Home Living  Type of Home: House  Lives With: Spouse  Home Adaptive Equipment: Walker rolling or standard  Home Layout: Two level  Bathroom Shower/Tub: Walk-in shower  Bathroom Equipment: Shower chair without back, Grab bars in shower  Prior Function Per Pt/Caregiver Report:  Prior Function Per Pt/Caregiver Report  Level of [place]: Independent with ADLs and functional transfers, Independent with homemaking with ambulation (Patient retired June 30, 2023.)  Hand Dominance: Right  IADL History:  Mode of Transportation:  (Patient dependent on [employer] for driving.)  ADL/IADL:  Eating Assistance: Independent  Grooming Assistance: Independent  Bathing Assistance: Independent (sits to shower)  UE Dressing Assistance: Independent  LE Dressing Assistance: Independent  Toileting Assistance with Device: IndependentIADL:  Patient feels like she has to lay down after eating in am from 9-2. Up from 2-9 but unable to function during that time. She sometimes requires a nap.  Patient with significant fatigue with self care.     currently completing IADL tasks due to significant fatigue.     Activity Tolerance:poor     Mobility/Transfer:     Patient with freezing gait, taking dopamine to manage.  Prior to starting dopamine, patient  feel 2x/a week, Now falls are down to 1x/month.     Freezing gait happens when turning in tight spaces, doorways    Sensation:  Light Touch:  (Patient denies deficits.)    Coordination:  Coordination Comment: writing decreased since COVID, patient reports practice does not help  Hand Function:  Coordination:  (9hpt right 25.43 sec, left 29 sec)         OP EDUCATION:   Provided instruction on fatigue scale, fatigue log, role of OT,    Goals:  Active       OT Problem       OT-Patient will manage fatigue independently through use of fatigue traffic light and other fatigue strategies to allow  participation in ADL/IADL/leisure with fatigue rating of 4/10(yellow) or less.        Start:  12/12/23    Expected End:  03/07/24            Patient will complete complex IADL independently using cognitive and fatigue management strategies        Start:  12/12/23    Expected End:  03/07/24            OT- Patient will use memory and planning system independently as a compensatory tool for management of day, memory and planning.        Start:  12/12/23    Expected End:  03/07/24            Patient will use memory strategies independently to allow improved ability to manage appointments, remember errands,and to improve working memory.       Start:  12/12/23    Expected End:  03/07/24            OT-Patient will demonstrate good recall and understanding of written material through use of PQRST and other reading strategies.        Start:  12/12/23    Expected End:  03/07/24

## 2023-12-18 ENCOUNTER — APPOINTMENT (OUTPATIENT)
Dept: PRIMARY CARE | Facility: CLINIC | Age: 68
End: 2023-12-18
Payer: MEDICARE

## 2023-12-18 DIAGNOSIS — K11.23 CHRONIC SCLEROSING SIALADENITIS: Primary | ICD-10-CM

## 2023-12-19 ENCOUNTER — OFFICE VISIT (OUTPATIENT)
Dept: OTOLARYNGOLOGY | Facility: CLINIC | Age: 68
End: 2023-12-19
Payer: MEDICARE

## 2023-12-19 VITALS — BODY MASS INDEX: 27.31 KG/M2 | WEIGHT: 160 LBS | HEIGHT: 64 IN

## 2023-12-19 DIAGNOSIS — R68.2 DRY MOUTH: ICD-10-CM

## 2023-12-19 DIAGNOSIS — K11.23 CHRONIC SCLEROSING SIALADENITIS: ICD-10-CM

## 2023-12-19 DIAGNOSIS — R13.10 DYSPHAGIA, UNSPECIFIED TYPE: Primary | ICD-10-CM

## 2023-12-19 PROCEDURE — 1126F AMNT PAIN NOTED NONE PRSNT: CPT | Performed by: STUDENT IN AN ORGANIZED HEALTH CARE EDUCATION/TRAINING PROGRAM

## 2023-12-19 PROCEDURE — 31575 DIAGNOSTIC LARYNGOSCOPY: CPT | Performed by: STUDENT IN AN ORGANIZED HEALTH CARE EDUCATION/TRAINING PROGRAM

## 2023-12-19 PROCEDURE — 4010F ACE/ARB THERAPY RXD/TAKEN: CPT | Performed by: STUDENT IN AN ORGANIZED HEALTH CARE EDUCATION/TRAINING PROGRAM

## 2023-12-19 PROCEDURE — 1159F MED LIST DOCD IN RCRD: CPT | Performed by: STUDENT IN AN ORGANIZED HEALTH CARE EDUCATION/TRAINING PROGRAM

## 2023-12-19 PROCEDURE — 1160F RVW MEDS BY RX/DR IN RCRD: CPT | Performed by: STUDENT IN AN ORGANIZED HEALTH CARE EDUCATION/TRAINING PROGRAM

## 2023-12-19 PROCEDURE — 1036F TOBACCO NON-USER: CPT | Performed by: STUDENT IN AN ORGANIZED HEALTH CARE EDUCATION/TRAINING PROGRAM

## 2023-12-19 PROCEDURE — 99204 OFFICE O/P NEW MOD 45 MIN: CPT | Performed by: STUDENT IN AN ORGANIZED HEALTH CARE EDUCATION/TRAINING PROGRAM

## 2023-12-19 RX ORDER — XYLITOL 550 MG
MUCO-ADHESIVE BUCCAL TABLET MUCOUS MEMBRANE
Qty: 1 EACH | Refills: 3 | Status: SHIPPED | OUTPATIENT
Start: 2023-12-19 | End: 2024-02-12 | Stop reason: WASHOUT

## 2023-12-19 NOTE — PROGRESS NOTES
KELLIE Ku is a 68 y.o. female presenting for initial evaluation of oral dryness and dysphagia.  The patient reports her symptoms developed over 1 year ago following COVID-19.  Endorses dry mouth and difficulty swallowing pills, occasionally chokes with thin liquids.  Recently had a minor salivary gland biopsy performed by Dr. Parker, results consistent with chronic sclerosing sialadenitis.  No evidence of Sjogren's.    In addition the patient endorses lower extremity arthralgic pain.  Scheduled to see rheumatology.    Past Medical History:   Diagnosis Date    Disease of thyroid gland graves 2008    Personal history of irradiation     Status post radioactive iodine thyroid ablation    Personal history of other diseases of the circulatory system     History of hypertension    Personal history of other endocrine, nutritional and metabolic disease     History of type 2 diabetes mellitus    Personal history of other endocrine, nutritional and metabolic disease     History of Graves' disease    Personal history of other endocrine, nutritional and metabolic disease     History of hyperlipidemia    Personal history of other endocrine, nutritional and metabolic disease     History of hypothyroidism       Past Surgical History:   Procedure Laterality Date    OTHER SURGICAL HISTORY  01/12/2021    No history of surgery         Current Outpatient Medications on File Prior to Visit   Medication Sig Dispense Refill    carbidopa-levodopa (Parcopa)  mg disintegrating tablet Take 0.5 tablets by mouth 3 times a day.      cholecalciferol, vitD3,/vit K2 (vitamin D3-vitamin K2) 250 mcg (10,000 unit)-45 mcg capsule Take by mouth.      FreeStyle Srinivas 3 Sensor device USE AS DIRECTED TO CHECK BLOOD GLUCOSE. CHANGE EVERY 14 DAYS      L.acid/L.casei/B.bif/B.ciaran/FOS (PROBIOTIC BLEND ORAL) Take by mouth.      levothyroxine (Synthroid, Levoxyl) 100 mcg tablet Take 1 tablet (100 mcg) by mouth once daily. 90 tablet 1    losartan  (Cozaar) 50 mg tablet Take 1 tablet (50 mg) by mouth once daily. 90 tablet 1    metFORMIN XR (Glucophage-XR) 500 mg 24 hr tablet Take 1 tablet (500 mg) by mouth once daily in the evening. Take with meals.      MINERALS ORAL Take 1 tablet by mouth once daily.      NON FORMULARY Glia Plasmalogens      NON FORMULARY Take 2 each by mouth once daily. Ferrasorb      omega-3 fatty acids (SUPER OMEGA-3 ORAL) Take by mouth.      pen needle, diabetic (BD ULTRA-FINE RUPAL PEN NEEDLE MISC) 2 times a day. Use with insulin (90 day supply)  BD Pen Needle/Rupal/Ultra-fine/32G X 4MM      rosuvastatin (Crestor) 10 mg tablet Take 1 tablet (10 mg) by mouth once daily at bedtime. 90 tablet 1    Tresiba FlexTouch U-100 100 unit/mL (3 mL) injection Inject 10 Units under the skin once daily at bedtime.      [DISCONTINUED] omega-3/dha/epa/fish oil (OMEGA-3 ORAL) Super Omega 3 Epa/Dha 1000 MG oral capsules  OTC      [DISCONTINUED] vitamin B comp and vit C no.6 (VITAMIN B COMP WITH VIT C NO.6 ORAL) Take by mouth. Vitamin B 6 gummies       No current facility-administered medications on file prior to visit.        Allergies   Allergen Reactions    Mold Shortness of breath    Codeine Hives and Swelling    Lisinopril Cough        Review of Systems  A detailed 12 point ROS was performed and is negative except as noted in the intake form, HPI and/or Past Medical History        Physical Exam   CONSTITUTIONAL: Well developed, well nourished.  VOICE: Normal voice quality  RESPIRATION: Breathing comfortably, no stridor.  CV: No clubbing/cyanosis/edema in hands.  EYES: EOM Intact, sclera normal.  NEURO: Alert and oriented times 3, Cranial nerves V,VII intact and symmetric bilaterally.  HEAD AND FACE: Symmetric facial features, no masses or lesions, sinuses nontender to palpation.  SALIVARY GLANDS: Parotid and submandibular glands normal bilaterally.  EARS: Normal external ears, external auditory canals, and TMs to otoscopy  NOSE: External nose midline,  anterior rhinoscopy is normal with limited visualization to the anterior aspect of the interior turbinates. No lesions noted.  ORAL CAVITY/OROPHARYNX/LIPS: Normal mucous membranes, normal floor of mouth/tongue/OP, no masses or lesions are noted.  PHARYNGEAL WALLS AND NASOPHARYNX: No masses noted. Mucosa appears clean and moist  NECK/LYMPH: No LAD, no thyroid masses. Trachea palpably midline  SKIN: Neck skin is without injury  PSYCH: Alert and oriented with appropriate mood and affect     PROCEDURE NOTE:  FLEXIBLE NASOLARYNGOSCOPY     The risks, benefits, and alternatives were explained.  The patient wished to proceed and provided verbal consent.       INDICATIONS: To visualize anatomy in specific detail; evaluation of symptomatic disorder involving the voice, swallowing, or upper aerodigestive tract, including SIMBA.      DESCRIPTION OF PROCEDURE: After adequate afrin and lidocaine spray, I advanced the endoscope into the nasal cavity.  The nasal cavity, nasopharynx, tongue base, vallecula, posterior/lateral pharyngeal walls, pyriform, epiglottis, post cricoid area, and larynx were within normal limits.  The following specific findings should be noted:  No lesions/masses  Mobile TVCs bilaterally  No pooling of secretions  The patient tolerated the procedure without difficulty.     Assessment  Dysphagia  Dry mouth  Chronic sclerosing sialadenitis     Plan  68-year-old female presenting for evaluation of dry mouth and dysphagia following COVID-19.  Previous minor salivary gland biopsy consistent with chronic sclerosing sialadenitis.   The conditions were reviewed and explained, she will maintain adequate hydration in addition to biotine. Xylimelts added to her regimen.  MBS/esophagram ordered to evaluate swallow function.  Endorses arthralgic pain, she is scheduled to follow-up with rheumatology.   RTC 6w

## 2023-12-20 ENCOUNTER — APPOINTMENT (OUTPATIENT)
Dept: PRIMARY CARE | Facility: CLINIC | Age: 68
End: 2023-12-20
Payer: MEDICARE

## 2023-12-21 ENCOUNTER — TELEMEDICINE CLINICAL SUPPORT (OUTPATIENT)
Dept: OCCUPATIONAL THERAPY | Facility: HOSPITAL | Age: 68
End: 2023-12-21
Payer: MEDICARE

## 2023-12-21 DIAGNOSIS — R41.89 COGNITIVE DEFICITS: ICD-10-CM

## 2023-12-21 DIAGNOSIS — R41.89 COGNITIVE CHANGES: Primary | ICD-10-CM

## 2023-12-21 DIAGNOSIS — R53.83 FATIGUE, UNSPECIFIED TYPE: ICD-10-CM

## 2023-12-21 DIAGNOSIS — U09.9 POST-ACUTE SEQUELAE OF COVID-19 (PASC): ICD-10-CM

## 2023-12-21 PROCEDURE — 97535 SELF CARE MNGMENT TRAINING: CPT | Mod: GO | Performed by: OCCUPATIONAL THERAPIST

## 2023-12-21 ASSESSMENT — PAIN - FUNCTIONAL ASSESSMENT: PAIN_FUNCTIONAL_ASSESSMENT: 0-10

## 2023-12-21 ASSESSMENT — ACTIVITIES OF DAILY LIVING (ADL): HOME_MANAGEMENT_TIME_ENTRY: 54

## 2023-12-21 ASSESSMENT — PAIN SCALES - GENERAL: PAINLEVEL_OUTOF10: 0 - NO PAIN

## 2023-12-21 NOTE — PROGRESS NOTES
Occupational Therapy    Occupational Therapy Evaluation    Name: Юлия Ku  MRN: [id number]  : 1955  Date: 23  Time Calculation  Start Time: 1401  Stop Time: 1455  Time Calculation (min): 54 min    Assessment:     Patient is attempting to power through tasks she completes and would benefit from increased use of rest breaks. She has some difficulty with actual rest and will need more structured restful activities such as mindfulness, sitting outside, listening to music etc. Patient initially had difficulty identifying symptoms on fatigue traffic light, but as her fatigue progressed in session, she was able to feel and  identify more symptoms. Patient would benefit from resting in yellow level instead of waiting until she has reached the red level.     Plan:  Outpatient Plan  OT Plan: IADL retraining, ADL retraining, cognitive skills training, instruciton on compensatory tools/techniques/strategies, fatigue management  Frequency: 1x/week  Duration: 12 weeks  Onset Date: 21  Certification Period Start Date: 23  Certification Period End Date: 24  Number of Treatments Authorized: MN (today is visit 2)  Rehab Potential: Good  Plan of Care Agreement: Patient, Other (comment) (spouse)    Subjective   Current Problem:  1. Cognitive changes        2. Post-acute sequelae of COVID-19 (PASC)        3. Fatigue, unspecified type        4. Cognitive deficits          General:   OT Received On: 23  General  Referred By: Anila Abad NP  Past Medical History Relevant to Rehab: DM II, Graves Disease  Family/Caregiver Present: Yes ( present at patients home, but participated inconsistently during session)  Precautions:  Precautions  STEADI Fall Risk Score (The score of 4 or more indicates an increased risk of falling): 9  Hearing/Visual Limitations: WFL  Medical Precautions: Fall precautions       Pain Assessment:  Pain Assessment  Pain Assessment: 0-10  Pain Score: 0 - No  pain    Objective      ADL/IADL:    Current fatigue rating: 3/10 on fatigue scale   Fatigue log showed significant fatigue after am routine and breakfast.   Morning routine:   take dopamine  Get dressed (20 min)  Shower (30 min)  Patient is sitting for shower and for lower body dressing.     Recommend increased rest breaks during during AM routine- work 15-20 min followed by 5 min rest break.    Patient instructed in fatigue traffic light.  Patient identified symptoms for each area.  Green- clear headed, can walk fine, able to talk, make sense  Yellow- feels physically spent, talk a lot less, not thinking as clearly  Red- can't write anything, no ideas, no creativity, desire to fall asleep, feel tired, heavy, can't walk, can't get up, avoid talking   Recommend stopping at yellow.    Per patient,  notices- change in gait pattern, change in balance, patient feels this is the yellow level.    Fatigue increased to 7/10 approximately 15 minutes into session. Used pomodoro technique.  Patient participated in visualization exercise. Post Visualization fatigue improved to 1/10. Utilized a 2nd rest break approximately 20 minutes later.  Patient chose to sit quietly, but had much difficulty with this. Provided instruction on need for rest periods to be true rest and not an activity that will further zap strength.  Worked with patient to identify restorative tasks she could try and provided additional instruction on use of mindfulness including use of finger breathing and PTSD . Improvement in fatigue level reported by patient.     Does not wake up feeling refreshed.  Wakes up at 3:00 in the morning, can't return to sleep until 6:30 or 7:00. Goes to bed at 9:30 or 10:00.     Patient reports that her mind is going a mile a minute and she is ruminating during the night. Provided instruction on use mindfulness, and adult bed time stories.  Patient reports seeing her mom's face in the night.   Discussed changing  thought process. She reported that she tries to visualize mom when she was alive and healthy.  Asked patient to identify a pleasant memory.  Patient identified doing time with her .     Home program:  Try 15 min work/5 min breaks  Think of restorative tasks, try mindfulness once a day  Continue fatigue log    OP EDUCATION:   Fatigue management, mindfulness    Goals:  Active       OT Problem       OT-Patient will manage fatigue independently through use of fatigue traffic light and other fatigue strategies to allow participation in ADL/IADL/leisure with fatigue rating of 4/10(yellow) or less.        Start:  12/12/23    Expected End:  03/07/24            Patient will complete complex IADL independently using cognitive and fatigue management strategies        Start:  12/12/23    Expected End:  03/07/24            OT- Patient will use memory and planning system independently as a compensatory tool for management of day, memory and planning.        Start:  12/12/23    Expected End:  03/07/24            Patient will use memory strategies independently to allow improved ability to manage appointments, remember errands,and to improve working memory.       Start:  12/12/23    Expected End:  03/07/24            OT-Patient will demonstrate good recall and understanding of written material through use of PQRST and other reading strategies.        Start:  12/12/23    Expected End:  03/07/24

## 2023-12-22 PROBLEM — R41.89 COGNITIVE DEFICITS: Status: ACTIVE | Noted: 2023-12-22

## 2024-01-02 ENCOUNTER — APPOINTMENT (OUTPATIENT)
Dept: OTOLARYNGOLOGY | Facility: HOSPITAL | Age: 69
End: 2024-01-02
Payer: MEDICARE

## 2024-01-04 ENCOUNTER — APPOINTMENT (OUTPATIENT)
Dept: ALLERGY | Facility: CLINIC | Age: 69
End: 2024-01-04
Payer: MEDICARE

## 2024-01-15 ENCOUNTER — HOSPITAL ENCOUNTER (OUTPATIENT)
Dept: RADIOLOGY | Facility: HOSPITAL | Age: 69
Discharge: HOME | End: 2024-01-15
Payer: MEDICARE

## 2024-01-15 DIAGNOSIS — R13.10 DYSPHAGIA, UNSPECIFIED TYPE: ICD-10-CM

## 2024-01-15 PROCEDURE — 74220 X-RAY XM ESOPHAGUS 1CNTRST: CPT

## 2024-01-15 PROCEDURE — 2500000001 HC RX 250 WO HCPCS SELF ADMINISTERED DRUGS (ALT 637 FOR MEDICARE OP): Performed by: STUDENT IN AN ORGANIZED HEALTH CARE EDUCATION/TRAINING PROGRAM

## 2024-01-15 RX ADMIN — BARIUM SULFATE 700 MG: 700 TABLET ORAL at 11:26

## 2024-01-15 RX ADMIN — BARIUM SULFATE 50 ML: 1.05 SUSPENSION ORAL; RECTAL at 11:26

## 2024-01-25 ENCOUNTER — LAB (OUTPATIENT)
Dept: LAB | Facility: LAB | Age: 69
End: 2024-01-25
Payer: MEDICARE

## 2024-01-25 ENCOUNTER — APPOINTMENT (OUTPATIENT)
Dept: OTHER | Facility: CLINIC | Age: 69
End: 2024-01-25
Payer: MEDICARE

## 2024-01-25 ENCOUNTER — CONSULT (OUTPATIENT)
Dept: ALLERGY | Facility: CLINIC | Age: 69
End: 2024-01-25
Payer: MEDICARE

## 2024-01-25 VITALS
DIASTOLIC BLOOD PRESSURE: 70 MMHG | OXYGEN SATURATION: 96 % | SYSTOLIC BLOOD PRESSURE: 134 MMHG | HEIGHT: 64 IN | BODY MASS INDEX: 27.14 KG/M2 | RESPIRATION RATE: 17 BRPM | HEART RATE: 69 BPM | WEIGHT: 159 LBS | TEMPERATURE: 97.7 F

## 2024-01-25 DIAGNOSIS — R68.2 DRY MOUTH: Primary | ICD-10-CM

## 2024-01-25 DIAGNOSIS — D84.9 IMMUNE DEFICIENCY DISORDER (MULTI): ICD-10-CM

## 2024-01-25 DIAGNOSIS — R68.2 DRY MOUTH: ICD-10-CM

## 2024-01-25 LAB — PROT SERPL-MCNC: 6.9 G/DL (ref 6.4–8.2)

## 2024-01-25 PROCEDURE — 84165 PROTEIN E-PHORESIS SERUM: CPT | Performed by: ALLERGY & IMMUNOLOGY

## 2024-01-25 PROCEDURE — 84165 PROTEIN E-PHORESIS SERUM: CPT

## 2024-01-25 PROCEDURE — 86334 IMMUNOFIX E-PHORESIS SERUM: CPT

## 2024-01-25 PROCEDURE — 86320 SERUM IMMUNOELECTROPHORESIS: CPT | Performed by: ALLERGY & IMMUNOLOGY

## 2024-01-25 PROCEDURE — 86317 IMMUNOASSAY INFECTIOUS AGENT: CPT

## 2024-01-25 PROCEDURE — 99204 OFFICE O/P NEW MOD 45 MIN: CPT | Performed by: ALLERGY & IMMUNOLOGY

## 2024-01-25 PROCEDURE — 36415 COLL VENOUS BLD VENIPUNCTURE: CPT

## 2024-01-25 PROCEDURE — 84155 ASSAY OF PROTEIN SERUM: CPT

## 2024-01-25 PROCEDURE — 83519 RIA NONANTIBODY: CPT

## 2024-01-25 RX ORDER — PILOCARPINE HYDROCHLORIDE 5 MG/1
5 TABLET, FILM COATED ORAL 3 TIMES DAILY
Qty: 90 TABLET | Refills: 0 | Status: SHIPPED | OUTPATIENT
Start: 2024-01-25 | End: 2024-02-09

## 2024-01-25 NOTE — PROGRESS NOTES
Subjective   Patient ID: Юлия Ku is a 68 y.o. female.    Chief Complaint:  67 yo F with pmhx HTN, T2DM, postsx hypothyroidism, Sclerosing sialdentitis, freezing gait , Brain fog, fatigue here for low immunoglobulins.     Does not have history recurrent infections. No illness during childhood. No recurrent viral infections, but was diagnosed with long COVID.    Had walking pneumonia in high school. Never hospitalized for infections.      Had a sinus infection about 10 years ago that needed multiple rounds of antibiotics. Denies GI bugs, recurrent fungal infections.      No family history of immunodeficiency.   Father passed from liver and kidney disease   Mother passed from possibly alzheimer   Oldest son  of a brain aneursym, other son is healthy     Reports brain fog, dry eyes, dry mouth, fatigue, joint in knees, hair loss, 40 pounds unintentional (dry mouth), hiccups   Had dx of graves dz s/p radioactive ablation.       Review of Systems   All other systems reviewed and are negative.      Objective   Physical Exam  Vitals and nursing note reviewed.   Constitutional:       General: She is not in acute distress.     Appearance: Normal appearance. She is not ill-appearing, toxic-appearing or diaphoretic.   HENT:      Head: Normocephalic and atraumatic.      Right Ear: Tympanic membrane, ear canal and external ear normal. There is no impacted cerumen.      Left Ear: Tympanic membrane, ear canal and external ear normal. There is no impacted cerumen.      Nose: Nose normal. No congestion or rhinorrhea.      Mouth/Throat:      Mouth: Mucous membranes are dry.      Pharynx: Oropharynx is clear. No oropharyngeal exudate or posterior oropharyngeal erythema.   Eyes:      General:         Right eye: No discharge.         Left eye: No discharge.      Extraocular Movements: Extraocular movements intact.      Conjunctiva/sclera: Conjunctivae normal.   Cardiovascular:      Rate and Rhythm: Normal rate and regular  rhythm.      Heart sounds: Normal heart sounds. No murmur heard.     No friction rub. No gallop.   Pulmonary:      Effort: Pulmonary effort is normal. No respiratory distress.      Breath sounds: Normal breath sounds. No stridor. No wheezing, rhonchi or rales.   Chest:      Chest wall: No tenderness.   Abdominal:      General: Abdomen is flat.      Palpations: Abdomen is soft.   Musculoskeletal:         General: No swelling or deformity.      Cervical back: Normal range of motion.      Right lower leg: No edema.      Left lower leg: No edema.   Lymphadenopathy:      Cervical: No cervical adenopathy.   Skin:     General: Skin is warm.      Findings: No rash.   Neurological:      Mental Status: She is alert.      Comments: Walks with a cane, has tremors in arm and on legs        Laboratory:   09/04/23 -Pneumococcal titers 14 serotypes (1/14)  09/13/2023 -Prevnar vaccine given   Tetanus IgG -0.7 wnl   Diphtheria IgG -0.6 WNL   IgG- 469, repeat 501   IgA-51 (L)  IgM 41 WNL     Assessment/Plan    Юлия is a 69 yo F with HTN, T2DM, postsurgical hypothyroidism, hx of Grave's disease, Sclerosing sialdentitis (currently working up for Sjogren), freezing gait on carbidopa-levodopa. Based on low immunoglobulin and neurological issues, will obtain labs for stiff man person.     Hypogammaglobulinemia   Freezing gait   Will obtain repeat pneumococcal titers, Serum protein Electrophoresis, Anti-MARGO ab   Will speak to Neurology for EMG   Discussed infection precautions     Will follow up via telephone with lab results in about a month    I saw and evaluated the patient. I personally obtained the key and critical portions of the history and physical exam or was physically present for key and critical portions performed by the resident/fellow. I reviewed the resident/fellow's documentation and discussed the patient with the resident/fellow. I agree with the resident/fellow's medical decision making as documented in the note.    Fanny  Satinder Tian DO

## 2024-01-26 ENCOUNTER — APPOINTMENT (OUTPATIENT)
Dept: OTHER | Facility: CLINIC | Age: 69
End: 2024-01-26
Payer: MEDICARE

## 2024-01-28 LAB
GAD65 AB SER IA-ACNC: <5 IU/ML (ref 0–5)
S PN DA SERO 19F IGG SER-MCNC: 12.74 UG/ML
S PNEUM DA 1 IGG SER-MCNC: 2.69 UG/ML
S PNEUM DA 10A IGG SER-MCNC: 0.26 UG/ML
S PNEUM DA 11A IGG SER-MCNC: 0.05 UG/ML
S PNEUM DA 12F IGG SER-MCNC: 0.11 UG/ML
S PNEUM DA 14 IGG SER-MCNC: 5.02 UG/ML
S PNEUM DA 15B IGG SER-MCNC: 0.26 UG/ML
S PNEUM DA 17F IGG SER-MCNC: 0.26 UG/ML
S PNEUM DA 18C IGG SER-MCNC: 0.35 UG/ML
S PNEUM DA 19A IGG SER-MCNC: 1.95 UG/ML
S PNEUM DA 2 IGG SER-MCNC: 0.33 UG/ML
S PNEUM DA 20A IGG SER-MCNC: <0.04 UG/ML
S PNEUM DA 22F IGG SER-MCNC: 0.3 UG/ML
S PNEUM DA 23F IGG SER-MCNC: 0.58 UG/ML
S PNEUM DA 3 IGG SER-MCNC: 0.94 UG/ML
S PNEUM DA 33F IGG SER-MCNC: 0.4 UG/ML
S PNEUM DA 4 IGG SER-MCNC: 1.11 UG/ML
S PNEUM DA 5 IGG SER-MCNC: 0.67 UG/ML
S PNEUM DA 6B IGG SER-MCNC: 0.44 UG/ML
S PNEUM DA 7F IGG SER-MCNC: 2.88 UG/ML
S PNEUM DA 8 IGG SER-MCNC: 0.32 UG/ML
S PNEUM DA 9N IGG SER-MCNC: 0.45 UG/ML
S PNEUM DA 9V IGG SER-MCNC: 1.25 UG/ML
S PNEUM SEROTYPE IGG SER-IMP: NORMAL

## 2024-01-29 LAB
ALBUMIN: 4.4 G/DL (ref 3.4–5)
ALPHA 1 GLOBULIN: 0.3 G/DL (ref 0.2–0.6)
ALPHA 2 GLOBULIN: 0.9 G/DL (ref 0.4–1.1)
BETA GLOBULIN: 0.8 G/DL (ref 0.5–1.2)
GAMMA GLOBULIN: 0.5 G/DL (ref 0.5–1.4)
IMMUNOFIXATION COMMENT: NORMAL
PATH REVIEW - SERUM IMMUNOFIXATION: NORMAL
PATH REVIEW-SERUM PROTEIN ELECTROPHORESIS: NORMAL
PROTEIN ELECTROPHORESIS COMMENT: NORMAL

## 2024-01-30 LAB — C TETANI TOXOID IGG SERPL IA-ACNC: 0.7 IU/ML

## 2024-01-31 ENCOUNTER — OFFICE VISIT (OUTPATIENT)
Dept: OTOLARYNGOLOGY | Facility: CLINIC | Age: 69
End: 2024-01-31
Payer: MEDICARE

## 2024-01-31 ENCOUNTER — TELEPHONE (OUTPATIENT)
Dept: ALLERGY | Facility: CLINIC | Age: 69
End: 2024-01-31

## 2024-01-31 VITALS — BODY MASS INDEX: 27.14 KG/M2 | HEIGHT: 64 IN | WEIGHT: 159 LBS

## 2024-01-31 DIAGNOSIS — K11.23 CHRONIC SCLEROSING SIALADENITIS: ICD-10-CM

## 2024-01-31 DIAGNOSIS — R13.10 DYSPHAGIA, UNSPECIFIED TYPE: ICD-10-CM

## 2024-01-31 DIAGNOSIS — R68.2 DRY MOUTH: Primary | ICD-10-CM

## 2024-01-31 PROBLEM — Z86.39 HISTORY OF HYPOTHYROIDISM: Status: ACTIVE | Noted: 2024-01-31

## 2024-01-31 PROBLEM — I51.9 RIGHT VENTRICULAR DYSFUNCTION: Status: ACTIVE | Noted: 2024-01-31

## 2024-01-31 PROBLEM — Z86.39 HISTORY OF ELEVATED LIPIDS: Status: ACTIVE | Noted: 2024-01-31

## 2024-01-31 PROBLEM — M35.01 KERATOCONJUNCTIVITIS SICCA, IN SJOGREN'S SYNDROME (MULTI): Status: ACTIVE | Noted: 2024-01-29

## 2024-01-31 PROBLEM — R26.89 IMPAIRMENT OF BALANCE: Status: ACTIVE | Noted: 2024-01-31

## 2024-01-31 PROBLEM — R60.9 EDEMA: Status: ACTIVE | Noted: 2024-01-31

## 2024-01-31 PROBLEM — I25.10 ARTERIOSCLEROSIS OF CORONARY ARTERY: Status: ACTIVE | Noted: 2022-06-27

## 2024-01-31 PROBLEM — M54.50 LOW BACK PAIN, UNSPECIFIED: Status: ACTIVE | Noted: 2023-03-23

## 2024-01-31 PROBLEM — R93.1 ABNORMAL ECHOCARDIOGRAPHY: Status: ACTIVE | Noted: 2023-03-23

## 2024-01-31 PROBLEM — R26.89 SHUFFLING GAIT: Status: ACTIVE | Noted: 2024-01-31

## 2024-01-31 PROBLEM — Z86.39 HISTORY OF GRAVES' DISEASE: Status: ACTIVE | Noted: 2024-01-31

## 2024-01-31 PROBLEM — Z86.39 HISTORY OF TYPE 2 DIABETES MELLITUS: Status: ACTIVE | Noted: 2024-01-31

## 2024-01-31 PROBLEM — D84.9 IMMUNOLOGIC DEFICIENCY SYNDROME (MULTI): Status: ACTIVE | Noted: 2024-01-31

## 2024-01-31 PROBLEM — Z86.79 HISTORY OF HYPERTENSION: Status: ACTIVE | Noted: 2024-01-31

## 2024-01-31 PROBLEM — B34.9 NONSPECIFIC SYNDROME SUGGESTIVE OF VIRAL ILLNESS: Status: ACTIVE | Noted: 2024-01-31

## 2024-01-31 PROBLEM — M25.50 ARTHRALGIA: Status: ACTIVE | Noted: 2024-01-31

## 2024-01-31 PROBLEM — R06.09 DYSPNEA ON EXERTION: Status: ACTIVE | Noted: 2024-01-31

## 2024-01-31 PROCEDURE — 1159F MED LIST DOCD IN RCRD: CPT | Performed by: STUDENT IN AN ORGANIZED HEALTH CARE EDUCATION/TRAINING PROGRAM

## 2024-01-31 PROCEDURE — 1126F AMNT PAIN NOTED NONE PRSNT: CPT | Performed by: STUDENT IN AN ORGANIZED HEALTH CARE EDUCATION/TRAINING PROGRAM

## 2024-01-31 PROCEDURE — 1036F TOBACCO NON-USER: CPT | Performed by: STUDENT IN AN ORGANIZED HEALTH CARE EDUCATION/TRAINING PROGRAM

## 2024-01-31 PROCEDURE — 4010F ACE/ARB THERAPY RXD/TAKEN: CPT | Performed by: STUDENT IN AN ORGANIZED HEALTH CARE EDUCATION/TRAINING PROGRAM

## 2024-01-31 PROCEDURE — 99213 OFFICE O/P EST LOW 20 MIN: CPT | Performed by: STUDENT IN AN ORGANIZED HEALTH CARE EDUCATION/TRAINING PROGRAM

## 2024-01-31 RX ORDER — SODIUM FLUORIDE 6.1 MG/ML
GEL, DENTIFRICE DENTAL DAILY
COMMUNITY
Start: 2023-12-26

## 2024-01-31 RX ORDER — FLUOXETINE HYDROCHLORIDE 20 MG/1
20 CAPSULE ORAL
COMMUNITY
Start: 2024-01-29 | End: 2024-03-01 | Stop reason: SDUPTHER

## 2024-01-31 RX ORDER — CYCLOSPORINE 0.5 MG/ML
1 EMULSION OPHTHALMIC EVERY 12 HOURS
COMMUNITY
Start: 2024-01-16

## 2024-01-31 NOTE — PROGRESS NOTES
HPI  1/31/23  The patient present for follow-up, esophagram notable for slight delay of barium tablet transit at the level of the aortic arch, no aspiration or epiglottic penetration noted.  Did not proceed with MBS, denies choking, current dysphagia, odynophagia.   Endorses persistent dry mouth.  Undergoing evaluation by rheumatology, allergy and immunology.  Recall   Юлия Ku is a 68 y.o. female presenting for initial evaluation of oral dryness and dysphagia.  The patient reports her symptoms developed over 1 year ago following COVID-19.  Endorses dry mouth and difficulty swallowing pills, occasionally chokes with thin liquids.  Recently had a minor salivary gland biopsy performed by Dr. Parker, results consistent with chronic sclerosing sialadenitis.  No evidence of Sjogren's.    In addition the patient endorses lower extremity arthralgic pain.  Scheduled to see rheumatology.    Past Medical History:   Diagnosis Date    Disease of thyroid gland graves 2008    Fatigue 12/2023    Personal history of irradiation     Status post radioactive iodine thyroid ablation    Personal history of other diseases of the circulatory system     History of hypertension    Personal history of other endocrine, nutritional and metabolic disease     History of type 2 diabetes mellitus    Personal history of other endocrine, nutritional and metabolic disease     History of Graves' disease    Personal history of other endocrine, nutritional and metabolic disease     History of hyperlipidemia    Personal history of other endocrine, nutritional and metabolic disease     History of hypothyroidism       Past Surgical History:   Procedure Laterality Date    OTHER SURGICAL HISTORY  01/12/2021    No history of surgery         Current Outpatient Medications on File Prior to Visit   Medication Sig Dispense Refill    carbidopa-levodopa (Parcopa)  mg disintegrating tablet Take 0.5 tablets by mouth 3 times a day.      cholecalciferol,  vitD3,/vit K2 (vitamin D3-vitamin K2) 250 mcg (10,000 unit)-45 mcg capsule Take by mouth.      cycloSPORINE (Restasis) 0.05 % ophthalmic emulsion Administer 1 drop into both eyes every 12 hours.      FLUoxetine (PROzac) 20 mg capsule Take 1 capsule (20 mg) by mouth once daily.      FreeStyle Srinivas 3 Sensor device USE AS DIRECTED TO CHECK BLOOD GLUCOSE. CHANGE EVERY 14 DAYS      insulin degludec (TRESIBA U-100 INSULIN SUBQ) Inject 10 Units under the skin every 7 days. Take as directed per insulin instructions.      levothyroxine (Synthroid, Levoxyl) 100 mcg tablet Take 1 tablet (100 mcg) by mouth once daily. 90 tablet 1    losartan (Cozaar) 50 mg tablet Take 1 tablet (50 mg) by mouth once daily. 90 tablet 1    metFORMIN XR (Glucophage-XR) 500 mg 24 hr tablet Take 1 tablet (500 mg) by mouth once daily in the evening. Take with meals.      MINERALS ORAL Take 1 tablet by mouth once daily.      NON FORMULARY Glia Plasmalogens      NON FORMULARY Take 2 each by mouth once daily. Ferrasorb      omega-3 fatty acids (SUPER OMEGA-3 ORAL) Take by mouth.      pilocarpine (Salagen, pilocarpine,) 5 mg tablet Take 1 tablet (5 mg) by mouth 3 times a day. 90 tablet 0    PreviDent 5000 Dry Mouth 1.1 % dental paste Apply to teeth once daily.      rosuvastatin (Crestor) 10 mg tablet Take 1 tablet (10 mg) by mouth once daily at bedtime. 90 tablet 1    xylitoL (XyliMelts) 550 mg muco-adhesive buccal tablet Use as indicated (Patient not taking: Reported on 1/25/2024) 1 each 3    [DISCONTINUED] L.acid/L.casei/B.bif/B.ciaran/FOS (PROBIOTIC BLEND ORAL) Take by mouth.      [DISCONTINUED] pen needle, diabetic (BD ULTRA-FINE RUPAL PEN NEEDLE MISC) 2 times a day. Use with insulin (90 day supply)  BD Pen Needle/Rupal/Ultra-fine/32G X 4MM      [DISCONTINUED] Tresiba FlexTouch U-100 100 unit/mL (3 mL) injection Inject 10 Units under the skin once daily at bedtime.       No current facility-administered medications on file prior to visit.        Allergies    Allergen Reactions    Mold Shortness of breath    Codeine Hives and Swelling    Lisinopril Cough        Review of Systems  A detailed 12 point ROS was performed and is negative except as noted in the intake form, HPI and/or Past Medical History        Physical Exam   CONSTITUTIONAL: Well developed, well nourished.  VOICE: Normal voice quality  RESPIRATION: Breathing comfortably, no stridor.  CV: No clubbing/cyanosis/edema in hands.  EYES: EOM Intact, sclera normal.  NEURO: Alert and oriented times 3, Cranial nerves V,VII intact and symmetric bilaterally.  HEAD AND FACE: Symmetric facial features, no masses or lesions, sinuses nontender to palpation.  SALIVARY GLANDS: Parotid and submandibular glands normal bilaterally.  EARS: Normal external ears, external auditory canals, and TMs to otoscopy  NOSE: External nose midline, anterior rhinoscopy is normal with limited visualization to the anterior aspect of the interior turbinates. No lesions noted.  ORAL CAVITY/OROPHARYNX/LIPS: Normal mucous membranes, normal floor of mouth/tongue/OP, no masses or lesions are noted.  PHARYNGEAL WALLS AND NASOPHARYNX: No masses noted. Mucosa appears clean and moist  NECK/LYMPH: No LAD, no thyroid masses. Trachea palpably midline  SKIN: Neck skin is without injury  PSYCH: Alert and oriented with appropriate mood and affect       Assessment  Dry mouth  Chronic sclerosing sialadenitis     Plan  68-year-old female presenting for evaluation of dry mouth and dysphagia following COVID-19.  Previous minor salivary gland biopsy consistent with chronic sclerosing sialadenitis.  Esophagram swallow department without difficulty. Mild delay of barium tablet transit at the level of the Arctic arch.  No aspiration or epiglottic penetration noted.  Would like to defer MBS stating she currently has no swallowing issues.  Undergoing evaluation by rheumatology and allergy/immunology.  Continues to complain of dry mouth.  She was referred to dental  medicine.  RTC PRN

## 2024-02-05 ENCOUNTER — APPOINTMENT (OUTPATIENT)
Dept: RADIOLOGY | Facility: HOSPITAL | Age: 69
End: 2024-02-05
Payer: MEDICARE

## 2024-02-06 ENCOUNTER — OFFICE VISIT (OUTPATIENT)
Dept: OBSTETRICS AND GYNECOLOGY | Facility: CLINIC | Age: 69
End: 2024-02-06
Payer: MEDICARE

## 2024-02-06 VITALS — HEIGHT: 64 IN | WEIGHT: 159 LBS | BODY MASS INDEX: 27.14 KG/M2

## 2024-02-06 DIAGNOSIS — Z01.419 WOMEN'S ANNUAL ROUTINE GYNECOLOGICAL EXAMINATION: Primary | ICD-10-CM

## 2024-02-06 PROCEDURE — 4010F ACE/ARB THERAPY RXD/TAKEN: CPT | Performed by: NURSE PRACTITIONER

## 2024-02-06 PROCEDURE — 1036F TOBACCO NON-USER: CPT | Performed by: NURSE PRACTITIONER

## 2024-02-06 PROCEDURE — G0101 CA SCREEN;PELVIC/BREAST EXAM: HCPCS | Performed by: NURSE PRACTITIONER

## 2024-02-06 PROCEDURE — 1159F MED LIST DOCD IN RCRD: CPT | Performed by: NURSE PRACTITIONER

## 2024-02-06 PROCEDURE — 1160F RVW MEDS BY RX/DR IN RCRD: CPT | Performed by: NURSE PRACTITIONER

## 2024-02-06 PROCEDURE — 1126F AMNT PAIN NOTED NONE PRSNT: CPT | Performed by: NURSE PRACTITIONER

## 2024-02-06 ASSESSMENT — ENCOUNTER SYMPTOMS
CONSTITUTIONAL NEGATIVE: 1
MUSCULOSKELETAL NEGATIVE: 1
ENDOCRINE NEGATIVE: 1
EYES NEGATIVE: 1
GASTROINTESTINAL NEGATIVE: 1
CARDIOVASCULAR NEGATIVE: 1
RESPIRATORY NEGATIVE: 1
PSYCHIATRIC NEGATIVE: 1
NEUROLOGICAL NEGATIVE: 1

## 2024-02-06 NOTE — PROGRESS NOTES
Subjective   Patient ID: Юлия Ku is a 68 y.o. female who presents for New Patient Visit (States last gyn exam was 2020 with normal PAP smear in [place]. /Complains of long term covid issues, dry mouth and dry vagina.).  68 year old here for annual exam without complaints.  She had a mammogram in November 2023.  She also had a DEXA in 2023.  She denies any bleeding, pain, discharge, urinary changes and breast complaints.  She is not indicated for a pap due to age.          Review of Systems   Constitutional: Negative.    HENT: Negative.     Eyes: Negative.    Respiratory: Negative.     Cardiovascular: Negative.    Gastrointestinal: Negative.    Endocrine: Negative.    Genitourinary: Negative.    Musculoskeletal: Negative.    Skin: Negative.    Neurological: Negative.    Psychiatric/Behavioral: Negative.         Objective   Physical Exam  Vitals reviewed.   Constitutional:       Appearance: Normal appearance. She is well-developed.   Pulmonary:      Effort: Pulmonary effort is normal. No respiratory distress.   Chest:   Breasts:     Breasts are symmetrical.      Right: Normal. No swelling, bleeding, inverted nipple, mass, nipple discharge, skin change or tenderness.      Left: Normal. No swelling, bleeding, inverted nipple, mass, nipple discharge, skin change or tenderness.   Abdominal:      Palpations: Abdomen is soft.   Genitourinary:     General: Normal vulva.      Exam position: Lithotomy position.      Pubic Area: No rash.       Labia:         Right: No rash, tenderness, lesion or injury.         Left: No rash, tenderness, lesion or injury.       Urethra: No prolapse, urethral pain, urethral swelling or urethral lesion.      Vagina: Normal.      Cervix: Normal.      Uterus: Normal.       Adnexa: Right adnexa normal and left adnexa normal.      Rectum: Normal.   Musculoskeletal:         General: Normal range of motion.   Lymphadenopathy:      Upper Body:      Right upper body: No supraclavicular,  axillary or pectoral adenopathy.      Left upper body: No supraclavicular, axillary or pectoral adenopathy.   Skin:     General: Skin is warm and dry.   Neurological:      General: No focal deficit present.      Mental Status: She is alert and oriented to person, place, and time. Mental status is at baseline.   Psychiatric:         Attention and Perception: Attention and perception normal.         Mood and Affect: Mood and affect normal.         Speech: Speech normal.         Behavior: Behavior normal. Behavior is cooperative.         Thought Content: Thought content normal.         Judgment: Judgment normal.         Assessment/Plan   Problem List Items Addressed This Visit             ICD-10-CM    Women's annual routine gynecological examination - Primary Z01.419        Pap not indicated  Mammogram due Nov 2024  DEXA due 2025  Follow up 1 year or as needed    ANTONELLA Willingham-CNP 02/06/24 3:08 PM

## 2024-02-07 NOTE — PROGRESS NOTES
[facility] In-Person     Subjective   COVID-19 Infection Date:  01/2021  PCR confirmed (sx: Fatigue, Loss or disturbed taste, Brain fog Fatigue, Loss or disturbed taste, Brain fog  - no hospitalization, no treatment)    COVID-19 vaccine status: Pfizer 03/30/21, 04/20/21, 10/29/21, 09/16/22, 09/20/23    Occupation:     Current Providers: PCP Dr. Dupree, Endocrinology Dr. Ingram, Neurology Movement Dr. Ibarra, Neurology MS Dr. Zavala,  Rheumatology Dr. Obrien and Dr. Abdul, Cardiology Dr. Funez, ENT Dr. Tan, Immunology Dr. Rajan, OT [facility]    Survey Scores: 10/2023  PHQ-9: 5  MARGO-7: 0  Sleep Wellness: 11 snores  FSS average: 4.56  Modified Ecog average: 1.58   MOCA: 17/22 (10/2023)  Overall Health: 45    68 y.o. female with a h/o COVID-19 in January 2021, DM2, HTN, HLD, Grave's disease s/p ablation now on thyroid replacement, CAD, [facility] with c/o dry mouth, freezing gait, fatigue, brain fog    Brain fog comes and goes, she didn't have brain fog yesterday when she skipped taking thyroid and DM medication, today took thyroid medication again and brain fog recurred  Didn't hear back from Sommer Guardado after last virtual appointment  Restarted [facility] neuro PT  Freezing gait worsens before she is due for next dose of Dopamine  Neurology recommended to shorten the interval of her medications, now taking this every 4 hours from every 6 hours, that seems to have helped for some time and now having freezing gait episodes again around the time of next dose of medication  Dry mouth has been bad, when taking the pills to help her produce saliva then her mouth is too wet  Trying to get an appointment with Dentist Dr. Lei, ENT department is trying to get a hold of him  Fatigue goes along with brain fog, fine yesterday and today OK  Seeing Dr. Rajan this Friday for follow-up  Fell walking out of the bathroom last week  Freezing gait happens in doorways and tight  locations  Started Prozac two weeks ago per rheumatologist to help with serotonin  Has been sleeping well, feels rested in the morning    Relevant prior healthcare visits:  -02/2024 PT [facility]   -01/2024 Endocrinology follow-up on DM2 and postablative hypothyroidism  -01/2024 Neurology [facility] for primary freezing of gait, sinemet has been helpful and plans to increase dose  -01/2024 Immunology obtaining labs for stiff person syndrome based on low immunoglobulin and neurological problems, plans to speak to neurology for EMG  -01/2024 Rheumatology [facility] notes improvement on dry mouth on pilocarpine, notes that joint pain sounds inflammatory, recommends trial of antidepressant for fatigue and brain fog along with anhedonia  -01/2024 ENT referred to dental medicine for dry mouth  -12/2023 OT for fatigue and cognitive deficitis  -11/2023 Cardiology for RV dysfunction without pulmonary HTN, ischemic evaluation and RHC was negative, plans to repeat echocardiogram. Mild orthostatic hypotension in the office but symptoms did not improve with holding losartan or with midodrine  -09/2023 PCP referred to neuropsychology for worsening brain fog, consult to Rheumatology for gait changes  -09/2023 SLP [facility] for cognitive deficits and communication deficits  -08/2023 OT notes improvement in overal executive functioning and L hand FMC, however slight decline in bilateral hand  strength and L gross shoulder strength    Relevant prior diagnostic studies:  -12/2023 GI esophagram shows brief delay in barium tablet transit at the level of the aortic arch  -11/2023 HSAT normal  -09/2023 echocardiogram   -03/2023 MRI brain unremarkable, MRI C-spine with cervical spondylosis    Relevant prior laboratory values, unremarkable unless noted:  -01/2024 TSH, HbA1c 6.2%, MARGO ab, SPEP, strep pneumo ab, tetanus ab  -10/2023 pm cortisol and am cortisol slightly elevatd, IgG and IgA low, Vitamin B6  -09/2023 Ferritin 19, iron studies low, CBC/D, Copper,  Vitamin E, Folate, TSH  -08/2023 Tetanus Ab, Diphteria Ab, Strep Pneumo Ab, Lyme Ab  -07/2023 Esr, Vitamin A, MONALISA, RF, Thyroxine  -06/2023 Tryptase, Allergy respiratory panel abnormal, thyroid perox, thyroglobulin, Ttransglutaminase, IGA low, HbA1c 6.2%, IGG low, beta 2 glycoprotein, cardiolipin ab  -06/2023 cryoglobulin  -04/2023 Heptatis C ab, MONALISA, AChR bidning Ab, Vitamin D, Vitamin B12, Anti-SSB and SSA    Exercise routine:  Diet:  Weight hx: pre-COVID-19 201 lbs -> post-COVID-19 157 lbs  Substance use: no tobacco use hx  Social:       Current Outpatient Medications:     carbidopa-levodopa (Parcopa)  mg disintegrating tablet, Take 0.5 tablets by mouth 3 times a day., Disp: , Rfl:     cholecalciferol, vitD3,/vit K2 (vitamin D3-vitamin K2) 250 mcg (10,000 unit)-45 mcg capsule, Take by mouth., Disp: , Rfl:     cycloSPORINE (Restasis) 0.05 % ophthalmic emulsion, Administer 1 drop into both eyes every 12 hours., Disp: , Rfl:     FLUoxetine (PROzac) 20 mg capsule, Take 1 capsule (20 mg) by mouth once daily., Disp: , Rfl:     FreeStyle Srinivas 3 Sensor device, USE AS DIRECTED TO CHECK BLOOD GLUCOSE. CHANGE EVERY 14 DAYS, Disp: , Rfl:     insulin degludec (TRESIBA U-100 INSULIN SUBQ), Inject 10 Units under the skin every 7 days. Take as directed per insulin instructions., Disp: , Rfl:     levothyroxine (Synthroid, Levoxyl) 100 mcg tablet, Take 1 tablet (100 mcg) by mouth once daily., Disp: 90 tablet, Rfl: 1    losartan (Cozaar) 50 mg tablet, Take 1 tablet (50 mg) by mouth once daily., Disp: 90 tablet, Rfl: 1    metFORMIN XR (Glucophage-XR) 500 mg 24 hr tablet, Take 1 tablet (500 mg) by mouth once daily in the evening. Take with meals., Disp: , Rfl:     MINERALS ORAL, Take 1 tablet by mouth once daily., Disp: , Rfl:     NON FORMULARY, Glia Plasmalogens, Disp: , Rfl:     NON FORMULARY, Take 2 each by mouth once daily. Sanna, Disp: , Rfl:     omega-3 fatty acids (SUPER OMEGA-3 ORAL), Take by mouth., Disp: ,  "Rfl:     pilocarpine (Salagen) 5 mg tablet, TAKE 1 TABLET(5 MG) BY MOUTH THREE TIMES DAILY, Disp: 270 tablet, Rfl: 1    PreviDent 5000 Dry Mouth 1.1 % dental paste, Apply to teeth once daily., Disp: , Rfl:     rosuvastatin (Crestor) 10 mg tablet, Take 1 tablet (10 mg) by mouth once daily at bedtime., Disp: 90 tablet, Rfl: 1    Past Medical History:   Diagnosis Date    Disease of thyroid gland graves 2008    Fatigue 12/2023    Personal history of irradiation     Status post radioactive iodine thyroid ablation    Personal history of other diseases of the circulatory system     History of hypertension    Personal history of other endocrine, nutritional and metabolic disease     History of type 2 diabetes mellitus    Personal history of other endocrine, nutritional and metabolic disease     History of Graves' disease    Personal history of other endocrine, nutritional and metabolic disease     History of hyperlipidemia    Personal history of other endocrine, nutritional and metabolic disease     History of hypothyroidism       Past Surgical History:   Procedure Laterality Date    BREAST BIOPSY  5/2008    OTHER SURGICAL HISTORY  01/12/2021    No history of surgery       Family History   Problem Relation Name Age of Onset    Alzheimer's disease Mother Sandeep Lambert     Hypertension Mother Sandeep Lambert     Cancer Mother Sandeep Lambert     Breast cancer Mother Sandeep Lambert     Heart disease Maternal Grandfather      Hypertension Father Chichi Lambert     Kidney disease Father Chichi Lambert        Objective   /86 (BP Location: Left arm, Patient Position: Sitting, BP Cuff Size: Adult)   Pulse 95   Temp 36.3 °C (97.3 °F) (Temporal)   Ht 1.613 m (5' 3.5\")   Wt 71.7 kg (158 lb)   SpO2 95%   BMI 27.55 kg/m²     Physical Exam  Vitals reviewed.   Constitutional:       Appearance: Normal appearance.   HENT:      Mouth/Throat:      Mouth: Mucous membranes are moist.   Eyes:      Conjunctiva/sclera: Conjunctivae normal. "   Cardiovascular:      Rate and Rhythm: Normal rate and regular rhythm.      Heart sounds: Normal heart sounds.   Pulmonary:      Effort: Pulmonary effort is normal.      Breath sounds: Normal breath sounds.   Abdominal:      General: Bowel sounds are normal.      Palpations: Abdomen is soft.   Musculoskeletal:         General: Normal range of motion.      Cervical back: Neck supple.   Skin:     General: Skin is warm and dry.   Neurological:      General: No focal deficit present.   Psychiatric:         Mood and Affect: Mood normal.         Cognition and Memory: Cognition normal.         Assessment/Plan   Problem List Items Addressed This Visit             ICD-10-CM       High    Post-acute sequelae of COVID-19 (PASC) - Primary U09.9     02/2024:  dry mouth, freezing gait, fatigue, brain fog  -continue close follow-up with your specialists and their recommendations  -continue with PT and restart OT, I will let [facility] know about the December visit  -consider seeing [facility] Psychologist Sherrill Fall    11/2023: dry mouth, freezing gait, fatigue, brain fog  -continue supplements that have been helpful with dry mouth  -follow-up with neurology and recommended treatments, glad that moving up medication timing has helped control freezing gait symptoms  -I will reach out to [facility]mer Guardado to get you scheduled with Occupational Therapy  -referral to Endocrinologist Dr. Castro per your request  -follow-up with Sherrill Fall as scheduled    10/2023: Dry mouth, freezing gait, fatigue, brain fog, poor sleep  -Home Sleep Study to rule out sleep apnea as a cause/contribution to your symptoms   -referral to Long COVID Psychology  -referral to Occupational therapist [facility] Guardado for fatigue and brain fog  -additional bloodwork: repeat immunoglobulin levels, Vitamin B6, morning cortisol level (ideally before 9am) and evening cortisol level after 4pm  -shorty supplement recommendation: Ferrous Gluconate is typically well tolerated, you can  "take 27-38 mg elemental iron every other day. Taking this along with orange juice or a Vitamin C supplement increases absorption.   -I will discuss dry mouth and freezing gait with other providers for further guidance  -additional recommendations provided under \"Patient Education\" section   -> immunology for low IGG and IGA, continue follow-up with Dr. Mata and neuroPT, lip biopsy with ENT            "

## 2024-02-09 RX ORDER — PILOCARPINE HYDROCHLORIDE 5 MG/1
TABLET, FILM COATED ORAL
Qty: 270 TABLET | Refills: 1 | Status: SHIPPED | OUTPATIENT
Start: 2024-02-09

## 2024-02-09 ASSESSMENT — LIFESTYLE VARIABLES: DO_YOU_DRINK?: I DID NOT DO THIS BEFORE COVID-19

## 2024-02-12 ENCOUNTER — OFFICE VISIT (OUTPATIENT)
Dept: OTHER | Facility: CLINIC | Age: 69
End: 2024-02-12
Payer: MEDICARE

## 2024-02-12 VITALS
HEIGHT: 64 IN | WEIGHT: 158 LBS | SYSTOLIC BLOOD PRESSURE: 144 MMHG | BODY MASS INDEX: 26.98 KG/M2 | OXYGEN SATURATION: 95 % | DIASTOLIC BLOOD PRESSURE: 86 MMHG | TEMPERATURE: 97.3 F | HEART RATE: 95 BPM

## 2024-02-12 DIAGNOSIS — U09.9 POST-ACUTE SEQUELAE OF COVID-19 (PASC): Primary | ICD-10-CM

## 2024-02-12 PROCEDURE — 1036F TOBACCO NON-USER: CPT | Performed by: NURSE PRACTITIONER

## 2024-02-12 PROCEDURE — 1159F MED LIST DOCD IN RCRD: CPT | Performed by: NURSE PRACTITIONER

## 2024-02-12 PROCEDURE — 1125F AMNT PAIN NOTED PAIN PRSNT: CPT | Performed by: NURSE PRACTITIONER

## 2024-02-12 PROCEDURE — 3079F DIAST BP 80-89 MM HG: CPT | Performed by: NURSE PRACTITIONER

## 2024-02-12 PROCEDURE — 4010F ACE/ARB THERAPY RXD/TAKEN: CPT | Performed by: NURSE PRACTITIONER

## 2024-02-12 PROCEDURE — 99215 OFFICE O/P EST HI 40 MIN: CPT | Performed by: NURSE PRACTITIONER

## 2024-02-12 PROCEDURE — 1160F RVW MEDS BY RX/DR IN RCRD: CPT | Performed by: NURSE PRACTITIONER

## 2024-02-12 PROCEDURE — 3077F SYST BP >= 140 MM HG: CPT | Performed by: NURSE PRACTITIONER

## 2024-02-12 PROCEDURE — 99215 OFFICE O/P EST HI 40 MIN: CPT | Mod: ZK | Performed by: NURSE PRACTITIONER

## 2024-02-12 ASSESSMENT — PAIN SCALES - GENERAL: PAINLEVEL: 6

## 2024-02-12 NOTE — PATIENT INSTRUCTIONS
"It was my pleasure seeing you in the [facility] today.  We will focus on addressing the following symptoms discussed today: dry mouth, freezing gait, fatigue, brain fog    My recommendations are as follows:  -continue close follow-up with your specialists and their recommendations  -continue with PT and restart OT, I will let Sommer know about the December visit  -consider seeing [facility] Psychologist Sherrill Fall    Tips to help improve brain fog and fatigue:  --avoid drinking Alcohol while recovering from COVID  --ensure to practice 30 minutes of exercise 7 days per week to keep BNDGF (brain derived neurotrophic growth factor) elevated as this will help in the regeneration of neurons, you may split exercise time up into 5 minute increments if this is better tolerated.   --Focus on eating a whole foods rich in fiber such as vegetables, fruits, beans, nuts, legumes, seeds, whole grains. Avoid processed foods and beverages. Eliminate added sugars, artificial sweeteners, processed oils, artificial dyes.  --slowly increase your activity by no more than 10% per week, rest when you feel tired  --utilize pacing techniques to manage fatigue, schedule rest times throughout the day so you do not run out of energy, more information to be found on this here: http://www.[facility].ca/health-info-site/Documents/post_covid-19_fatigue.pdf  --Review the  Health Talk on Managing Fatigue and Thinking Changes after COVID-19 here: https://www.[facility]ospit[facility].org/Health-Talks/articles/2022/05/managing-fatigue-and-thinking-changes-after-covid-19  --You can also find many helpful tips and tricks in this book: \"The Long COVID self-help guide, practical ways to manage symptoms\" by [facility] Specialists from the [facility] [place]  --another book you may find helpful: \"Clearing the Fog\" by Dr. Erick Ya      Please return to [facility] in 3 months, call [phone] or send a message through your [facility] maty if " needed.    Please also consider attending  [facility] support group for long-COVID and post-ICU patients. To contact support group, email [email or web address] or call [phone]

## 2024-02-12 NOTE — ASSESSMENT & PLAN NOTE
"02/2024:  dry mouth, freezing gait, fatigue, brain fog  -continue close follow-up with your specialists and their recommendations  -continue with PT and restart OT, I will let Sommer know about the December visit  -consider seeing [facility] Psychologist Sherrill Fall    11/2023: dry mouth, freezing gait, fatigue, brain fog  -continue supplements that have been helpful with dry mouth  -follow-up with neurology and recommended treatments, glad that moving up medication timing has helped control freezing gait symptoms  -I will reach out to Sommer Guardado to get you scheduled with Occupational Therapy  -referral to Endocrinologist Dr. Castro per your request  -follow-up with Sherrill Fall as scheduled    10/2023: Dry mouth, freezing gait, fatigue, brain fog, poor sleep  -Home Sleep Study to rule out sleep apnea as a cause/contribution to your symptoms   -referral to Long COVID Psychology  -referral to Occupational therapist Sommer Guardado for fatigue and brain fog  -additional bloodwork: repeat immunoglobulin levels, Vitamin B6, morning cortisol level (ideally before 9am) and evening cortisol level after 4pm  -shorty supplement recommendation: Ferrous Gluconate is typically well tolerated, you can take 27-38 mg elemental iron every other day. Taking this along with orange juice or a Vitamin C supplement increases absorption.   -I will discuss dry mouth and freezing gait with other providers for further guidance  -additional recommendations provided under \"Patient Education\" section   -> immunology for low IGG and IGA, continue follow-up with Dr. Mata and neuroPT, lip biopsy with ENT    "

## 2024-02-13 ENCOUNTER — APPOINTMENT (OUTPATIENT)
Dept: OTOLARYNGOLOGY | Facility: CLINIC | Age: 69
End: 2024-02-13
Payer: MEDICARE

## 2024-02-16 ENCOUNTER — OFFICE VISIT (OUTPATIENT)
Dept: ALLERGY | Facility: CLINIC | Age: 69
End: 2024-02-16
Payer: MEDICARE

## 2024-02-16 VITALS
TEMPERATURE: 97.9 F | BODY MASS INDEX: 29.08 KG/M2 | DIASTOLIC BLOOD PRESSURE: 86 MMHG | WEIGHT: 158 LBS | OXYGEN SATURATION: 97 % | HEIGHT: 62 IN | HEART RATE: 89 BPM | SYSTOLIC BLOOD PRESSURE: 142 MMHG | RESPIRATION RATE: 18 BRPM

## 2024-02-16 DIAGNOSIS — D80.1 HYPOGAMMAGLOBULINEMIA (MULTI): ICD-10-CM

## 2024-02-16 DIAGNOSIS — D83.9 CVID (COMMON VARIABLE IMMUNODEFICIENCY) (MULTI): Primary | ICD-10-CM

## 2024-02-16 PROCEDURE — 99214 OFFICE O/P EST MOD 30 MIN: CPT | Performed by: ALLERGY & IMMUNOLOGY

## 2024-02-16 PROCEDURE — 1159F MED LIST DOCD IN RCRD: CPT | Performed by: ALLERGY & IMMUNOLOGY

## 2024-02-16 PROCEDURE — 1125F AMNT PAIN NOTED PAIN PRSNT: CPT | Performed by: ALLERGY & IMMUNOLOGY

## 2024-02-16 PROCEDURE — 1036F TOBACCO NON-USER: CPT | Performed by: ALLERGY & IMMUNOLOGY

## 2024-02-16 PROCEDURE — 3077F SYST BP >= 140 MM HG: CPT | Performed by: ALLERGY & IMMUNOLOGY

## 2024-02-16 PROCEDURE — 4010F ACE/ARB THERAPY RXD/TAKEN: CPT | Performed by: ALLERGY & IMMUNOLOGY

## 2024-02-16 PROCEDURE — 3079F DIAST BP 80-89 MM HG: CPT | Performed by: ALLERGY & IMMUNOLOGY

## 2024-02-16 PROCEDURE — 1160F RVW MEDS BY RX/DR IN RCRD: CPT | Performed by: ALLERGY & IMMUNOLOGY

## 2024-02-16 NOTE — PROGRESS NOTES
Patient ID: Юлия Ku is a 68 y.o. female.     Chief Complaint: follow-up visit for hypogamm  History Of Present Illness  Юлия Ku is a 68 y.o. female with PMx Hypogamm presenting for follow-up visit.     CVID:  -Patient is still endorsing significant brain fog, night sweats, and arthralgia  -She is still having difficulty ambulating  -Pilocarpine did help increase salivary production  -Patient states she has had unintentional weight loss, but has only lost 16 lb since 2022  -Denies fever, chills, easy bruising/bleeding   -Denies recent infection or antibiotic use since last office visit; has not been placed on antibiotics for an infection in over a year  -Patient has been avoiding large crowds      Review of Systems    Pertinent positives and negatives have been assessed in the HPI. All other systems have been reviewed and are negative except as noted in the HPI.    Allergies  Mold, Codeine, and Lisinopril    Past Medical History  She has a past medical history of Disease of thyroid gland (graves 2008), Fatigue (12/2023), Personal history of irradiation, Personal history of other diseases of the circulatory system, Personal history of other endocrine, nutritional and metabolic disease, Personal history of other endocrine, nutritional and metabolic disease, Personal history of other endocrine, nutritional and metabolic disease, and Personal history of other endocrine, nutritional and metabolic disease.    Family History  Family History   Problem Relation Name Age of Onset    Alzheimer's disease Mother Sandeep Lambert     Hypertension Mother Sandeep Lambert     Cancer Mother Sandeep Lambert     Breast cancer Mother Sandeep Lambert     Heart disease Maternal Grandfather      Hypertension Father Chichi Lambert     Kidney disease Father Chichi Lambert      Surgical History  She has a past surgical history that includes Other surgical history (01/12/2021) and Breast biopsy (5/2008).    Social/Environmental History  She reports  that she quit smoking about 45 years ago. Her smoking use included cigarettes. She started smoking about 47 years ago. She has a 0.50 pack-year smoking history. She has never used smokeless tobacco. She reports that she does not currently use alcohol. She reports that she does not use drugs.    MEDICATIONS  Current Outpatient Medications on File Prior to Visit   Medication Sig Dispense Refill    carbidopa-levodopa (Parcopa)  mg disintegrating tablet Take 0.5 tablets by mouth 3 times a day.      cholecalciferol, vitD3,/vit K2 (vitamin D3-vitamin K2) 250 mcg (10,000 unit)-45 mcg capsule Take by mouth.      cycloSPORINE (Restasis) 0.05 % ophthalmic emulsion Administer 1 drop into both eyes every 12 hours.      FLUoxetine (PROzac) 20 mg capsule Take 1 capsule (20 mg) by mouth once daily.      FreeStyle Srinivas 3 Sensor device USE AS DIRECTED TO CHECK BLOOD GLUCOSE. CHANGE EVERY 14 DAYS      insulin degludec (TRESIBA U-100 INSULIN SUBQ) Inject 10 Units under the skin every 7 days. Take as directed per insulin instructions.      levothyroxine (Synthroid, Levoxyl) 100 mcg tablet Take 1 tablet (100 mcg) by mouth once daily. 90 tablet 1    losartan (Cozaar) 50 mg tablet Take 1 tablet (50 mg) by mouth once daily. 90 tablet 1    metFORMIN XR (Glucophage-XR) 500 mg 24 hr tablet Take 1 tablet (500 mg) by mouth once daily in the evening. Take with meals.      MINERALS ORAL Take 1 tablet by mouth once daily.      NON FORMULARY Glia Plasmalogens      NON FORMULARY Take 2 each by mouth once daily. Ferrasorb      omega-3 fatty acids (SUPER OMEGA-3 ORAL) Take by mouth.      pilocarpine (Salagen) 5 mg tablet TAKE 1 TABLET(5 MG) BY MOUTH THREE TIMES DAILY 270 tablet 1    PreviDent 5000 Dry Mouth 1.1 % dental paste Apply to teeth once daily.      rosuvastatin (Crestor) 10 mg tablet Take 1 tablet (10 mg) by mouth once daily at bedtime. 90 tablet 1    [DISCONTINUED] xylitoL (XyliMelts) 550 mg muco-adhesive buccal tablet Use as  "indicated 1 each 3     No current facility-administered medications on file prior to visit.         Physical Exam  Visit Vitals  /86   Pulse 89   Temp 36.6 °C (97.9 °F) (Temporal)   Resp 18   Ht 1.575 m (5' 2\")   Wt 71.7 kg (158 lb)   SpO2 97%   BMI 28.90 kg/m²   OB Status Postmenopausal   Smoking Status Former   BSA 1.77 m²       Wt Readings from Last 1 Encounters:   02/16/24 71.7 kg (158 lb)       Physical Exam    General: Well appearing, no acute distress  Head: Normocephalic, atraumatic, neck supple without lymphadenopathy  Eyes: EOMI, non-injected  Nose: No nasal crease, nares patent, normal turbinates, no discharge  Throat: Normal dentition, no erythema  Lungs: effort normal  Extremities: Moves all extremities symmetrically  Skin: No rashes/lesions  Psych: normal mood and affect    LAB RESULTS:  CBC:  Recent Labs     09/12/23  1312 04/27/23  1240 11/03/21  1600 08/14/21  1048   WBC 5.8 5.5 7.0 5.4   HGB 13.5 13.7 13.2 12.9   HCT 42.3 42.6 42.3 40.3    265 316 293   MCV 93 91 90 90   EOSABS 0.06 0.07  --  0.39       CMP:  Recent Labs     04/03/23  1131 11/03/21  1600 08/14/21  1048    141 140   K 4.3 4.5 4.8    104 105   CO2 29 29 27   ANIONGAP 11 13 13   BUN 13 17 17   CREATININE 0.76 0.86 0.86     Recent Labs     04/03/23  1131 08/14/21  1048 06/22/21  1445   ALBUMIN 4.6 4.2 4.1   ALKPHOS 43 48 52   ALT 11 25 29   AST 13 19 20   BILITOT 0.8 0.6 0.4       ALLERGY:   Lab Results   Component Value Date    ICIGE 44.4 06/02/2023    WHITEASH 0.13 06/02/2023    SILVERBIRCH <0.10 06/02/2023    BOXELDER 0.11 06/02/2023    MOUNTJUNIPER <0.10 06/02/2023    COTTONWOOD <0.10 06/02/2023    ELM <0.10 06/02/2023    MULBERRY <0.10 06/02/2023    PECANHICKORY <0.10 06/02/2023    MAPLESYCAMOR <0.10 06/02/2023    OAK <0.10 06/02/2023    BERMUDAGR <0.10 06/02/2023    JOHNSONGR <0.10 06/02/2023    BLUEGRASS 0.12 06/02/2023    TIMOTHYGRASS <0.10 06/02/2023     Lab Results   Component Value Date    [facility] " "<0.10 06/02/2023    PIGWEED <0.10 06/02/2023    COMRAGWEED <0.10 06/02/2023    SHEEPSOR <0.10 06/02/2023    PLANTAIN <0.10 06/02/2023    CATEPI 0.59 (A) 06/02/2023    DOGEPI 0.11 06/02/2023    ALTERNA <0.10 06/02/2023    CLADHERB <0.10 06/02/2023    ICA04 <0.10 06/02/2023    DERMFAR 1.50 (A) 06/02/2023    DERMPTE 0.92 (A) 06/02/2023    COCKR <0.10 06/02/2023     Lab Results   Component Value Date    WALNUT <0.10 06/02/2023       Recent Labs     06/02/23  1228   ICIGE 44.4     Recent Labs     09/12/23  1312 04/27/23  1240 08/14/21  1048   EOSABS 0.06 0.07 0.39     Recent Labs     06/02/23  1228   TRYPTASE 2.4       IMMUNO:   Recent Labs     10/30/23  0922 06/02/23  1228   * 469*  372*  105*  19  46   IGA 51*  --    IGM 41  --        COAG:   No results for input(s): \"PTT\", \"INR\", \"ARIXTRA\" in the last 82388 hours.    ABO:   No results for input(s): \"ABO\" in the last 84995 hours.    HEME/ENDO:  Recent Labs     01/09/24  1000 09/12/23  1312 09/07/23  0105 07/25/23  1356 06/15/23  1312 07/26/21  1011 06/22/21  1445 02/06/21  0903   TSH  --   --  0.75 37.75*  --  2.50   < > 0.91   HGBA1C 6.2*  --   --   --  6.2*  --   --  7.5   FERRITIN  --  19  --   --   --   --   --   --    IRONSAT  --  9*  --   --   --   --   --   --     < > = values in this interval not displayed.         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 I personally obtained the key and critical portions of the history and physical exam or was physically present for key and critical portions performed by the resident/fellow. I reviewed the resident/fellow's documentation " and discussed the patient with the resident/fellow. I agree with the resident/fellow's medical decision making as documented in the note.    Fanny Tian, DO     Fanny Tian, DO

## 2024-02-16 NOTE — PATIENT INSTRUCTIONS
Go to primaryimmune.org for information on common variable immune deficiency  You can also look at information on igg therapy.

## 2024-03-01 ENCOUNTER — OFFICE VISIT (OUTPATIENT)
Dept: BEHAVIORAL HEALTH | Facility: CLINIC | Age: 69
End: 2024-03-01
Payer: MEDICARE

## 2024-03-01 VITALS
TEMPERATURE: 97.9 F | WEIGHT: 164.2 LBS | BODY MASS INDEX: 30.22 KG/M2 | DIASTOLIC BLOOD PRESSURE: 96 MMHG | HEART RATE: 93 BPM | HEIGHT: 62 IN | SYSTOLIC BLOOD PRESSURE: 150 MMHG

## 2024-03-01 DIAGNOSIS — F32.0 CURRENT MILD EPISODE OF MAJOR DEPRESSIVE DISORDER WITHOUT PRIOR EPISODE (CMS-HCC): Primary | ICD-10-CM

## 2024-03-01 DIAGNOSIS — F41.1 GAD (GENERALIZED ANXIETY DISORDER): ICD-10-CM

## 2024-03-01 PROCEDURE — 1126F AMNT PAIN NOTED NONE PRSNT: CPT | Performed by: CLINICAL NURSE SPECIALIST

## 2024-03-01 PROCEDURE — 1036F TOBACCO NON-USER: CPT | Performed by: CLINICAL NURSE SPECIALIST

## 2024-03-01 PROCEDURE — 1159F MED LIST DOCD IN RCRD: CPT | Performed by: CLINICAL NURSE SPECIALIST

## 2024-03-01 PROCEDURE — 4010F ACE/ARB THERAPY RXD/TAKEN: CPT | Performed by: CLINICAL NURSE SPECIALIST

## 2024-03-01 PROCEDURE — 99204 OFFICE O/P NEW MOD 45 MIN: CPT | Performed by: CLINICAL NURSE SPECIALIST

## 2024-03-01 PROCEDURE — 3077F SYST BP >= 140 MM HG: CPT | Performed by: CLINICAL NURSE SPECIALIST

## 2024-03-01 PROCEDURE — 1160F RVW MEDS BY RX/DR IN RCRD: CPT | Performed by: CLINICAL NURSE SPECIALIST

## 2024-03-01 PROCEDURE — 3080F DIAST BP >= 90 MM HG: CPT | Performed by: CLINICAL NURSE SPECIALIST

## 2024-03-01 RX ORDER — FLUOXETINE HYDROCHLORIDE 40 MG/1
40 CAPSULE ORAL
Qty: 90 CAPSULE | Refills: 0 | Status: SHIPPED | OUTPATIENT
Start: 2024-03-01 | End: 2024-04-19 | Stop reason: SDUPTHER

## 2024-03-01 ASSESSMENT — PAIN SCALES - GENERAL: PAINLEVEL: 0-NO PAIN

## 2024-03-01 NOTE — PROGRESS NOTES
Outpatient Psychiatry      Subjective     Юлия Ku, a 68 y.o. female,presents for an initial psychiatry evaluation in outpatient psychiatry     Patient is referred by  dr salazar     Diagnosis:   Generalized anxiety disorder     Patient Active Problem List   Diagnosis    Type 2 diabetes mellitus (CMS/HCC)    Benign essential hypertension    Hyperlipidemia    Postablative hypothyroidism    Clinical xerostomia    Functional gait abnormality    Brain fog    Hypothyroidism    Gait abnormality    Hair loss    Personal history of COVID-19    Post-acute sequelae of COVID-19 (PASC)    Orthostatic hypotension    Fatigue    Cognitive deficits    Abnormal echocardiography    Arteriosclerosis of coronary artery    Arthralgia    Dysphagia    Dyspnea on exertion    Edema    History of elevated lipids    History of hypertension    History of hypothyroidism    History of Graves' disease    History of type 2 diabetes mellitus    Immunologic deficiency syndrome (CMS/HCC)    Impairment of balance    Keratoconjunctivitis sicca, in Sjogren's syndrome (CMS/HCC)    Low back pain, unspecified    Nonspecific syndrome suggestive of viral illness    Right ventricular dysfunction    Shuffling gait    Women's annual routine gynecological examination     Treatment Plan/Recommendations: can follow up in 4-6 weeks , can continue self care and wellness efforts and maintain appointments with other medical providers , can call  for treatment and scheduling concerns   Follow-up plan  was discussed with patient.    Review with patient: Treatment plan reviewed with the patient.  Medication risks/benefit reviewed with the patient    HPI:  Patient reports she had covid, was diagnosed with long haul covid , and since then has had more medical and mental health concerns , treated on an ongoing basis per her other medical providers , notes in the [facility] emr reviewed , she has co existing autoimmune conditions   She reports it is hard to deal  with brain fog and a freezing gait , dry mouth and fatigue , no safety issues with memory , no recent serious issues from falls , says she sometimes falls when turning , she walks with a cane which helps , and she takes sinemet which helps some also per her report   She keeps up with her appointments   She reports that she quit smoking about 45 years ago. Her smoking use included cigarettes. She started smoking about 47 years ago. She has a 0.50 pack-year smoking history. She has never used smokeless tobacco. She reports that she does not currently use alcohol. She reports that she does not use drugs.   + loss of interest in doing things   Can't drive, says more dependent on others   + fatigue  + brain fog   Low motivation   No thoughts of self harm , no thoughts of harming others   No psychoses   Insight is good   She has support in her life   Can get anxious , and tense at times   Sometimes wakes up and can't go back to sleep   She is knowledgeable about her medical conditions     Medical History:  Past Medical History:   Diagnosis Date    Disease of thyroid gland graves 2008    Fatigue 12/2023    Personal history of irradiation     Status post radioactive iodine thyroid ablation    Personal history of other diseases of the circulatory system     History of hypertension    Personal history of other endocrine, nutritional and metabolic disease     History of type 2 diabetes mellitus    Personal history of other endocrine, nutritional and metabolic disease     History of Graves' disease    Personal history of other endocrine, nutritional and metabolic disease     History of hyperlipidemia    Personal history of other endocrine, nutritional and metabolic disease     History of hypothyroidism     Surgical History:  Past Surgical History:   Procedure Laterality Date    BREAST BIOPSY  5/2008    OTHER SURGICAL HISTORY  01/12/2021    No history of surgery     Family History:  Family History   Problem Relation Name Age of  Onset    Alzheimer's disease Mother Sandeep Lambert     Hypertension Mother Sandeep Lambert     Cancer Mother Sandeep Lambert     Breast cancer Mother Sandeep Lambert     Heart disease Maternal Grandfather      Hypertension Father Chichi Lambert     Kidney disease Father Chichi Lambert      Social History:  Social History     Socioeconomic History    Marital status:      Spouse name: Not on file    Number of children: Not on file    Years of education: Not on file    Highest education level: Not on file   Occupational History    Not on file   Tobacco Use    Smoking status: Former     Packs/day: 0.25     Years: 2.00     Additional pack years: 0.00     Total pack years: 0.50     Types: Cigarettes     Start date: 1976     Quit date: 1978     Years since quittin.7    Smokeless tobacco: Never   Vaping Use    Vaping Use: Never used   Substance and Sexual Activity    Alcohol use: Not Currently    Drug use: Never    Sexual activity: Not Currently     Partners: Male     Birth control/protection: None   Other Topics Concern    Not on file   Social History Narrative    Not on file     Social Determinants of Health     Financial Resource Strain: Not on file   Food Insecurity: Not on file   Transportation Needs: Not on file   Physical Activity: Not on file   Stress: Not on file   Social Connections: Not on file   Intimate Partner Violence: Not on file   Housing Stability: Not on file     Record Review: brief     Vitals:  Vitals:    24 1354   BP: (!) 150/96   Pulse: 93   Temp: 36.6 °C (97.9 °F)     Marva Santos, APRN-CNS

## 2024-03-05 ENCOUNTER — APPOINTMENT (OUTPATIENT)
Dept: OCCUPATIONAL THERAPY | Facility: HOSPITAL | Age: 69
End: 2024-03-05
Payer: MEDICARE

## 2024-03-13 ENCOUNTER — TELEMEDICINE CLINICAL SUPPORT (OUTPATIENT)
Dept: OCCUPATIONAL THERAPY | Facility: HOSPITAL | Age: 69
End: 2024-03-13
Payer: MEDICARE

## 2024-03-13 DIAGNOSIS — U09.9 POST-ACUTE SEQUELAE OF COVID-19 (PASC): ICD-10-CM

## 2024-03-13 DIAGNOSIS — R53.83 FATIGUE: ICD-10-CM

## 2024-03-13 DIAGNOSIS — U09.9 POST COVID-19 CONDITION, UNSPECIFIED: ICD-10-CM

## 2024-03-13 DIAGNOSIS — R41.89 COGNITIVE DEFICITS: Primary | ICD-10-CM

## 2024-03-13 PROBLEM — D80.1 HYPOGAMMAGLOBULINEMIA (MULTI): Status: ACTIVE | Noted: 2024-03-13

## 2024-03-13 PROCEDURE — 97168 OT RE-EVAL EST PLAN CARE: CPT | Mod: GO,95 | Performed by: OCCUPATIONAL THERAPIST

## 2024-03-13 PROCEDURE — 97535 SELF CARE MNGMENT TRAINING: CPT | Mod: GO,95 | Performed by: OCCUPATIONAL THERAPIST

## 2024-03-13 ASSESSMENT — PAIN - FUNCTIONAL ASSESSMENT: PAIN_FUNCTIONAL_ASSESSMENT: 0-10

## 2024-03-13 ASSESSMENT — ENCOUNTER SYMPTOMS
OCCASIONAL FEELINGS OF UNSTEADINESS: 1
LOSS OF SENSATION IN FEET: 0
DEPRESSION: 0

## 2024-03-13 ASSESSMENT — ACTIVITIES OF DAILY LIVING (ADL)
HOME_MANAGEMENT_TIME_ENTRY: 18
BATHING_ASSISTANCE: INDEPENDENT

## 2024-03-13 ASSESSMENT — PAIN SCALES - GENERAL: PAINLEVEL_OUTOF10: 0 - NO PAIN

## 2024-03-13 NOTE — PROGRESS NOTES
Patient has a history of hypogammaglobulinemia and features of CVID. While she did have a mild response to the Pneumovax vaccine, I do not feel the levels are protective by strict standard and she may fit the diagnosis of CVID as well.

## 2024-03-13 NOTE — PROGRESS NOTES
Occupational Therapy    Occupational Therapy Evaluation    Name: Юлия Ku  MRN: [id number]  : 1955  Date: 24  Time Calculation  Start Time: 1501  Stop Time: 1544  Time Calculation (min): 43 min    Assessment:  Patient is a 68 year old female seen for re-evaluation this date. She continues to have significant fatigue and cognitive deficits and would benefit from continued OT tx. Extended previously established goals and added goals.  Patient motivated for improvement and has supportive spouse.      Plan:Resume OT tx toward modified plan of care.   Outpatient Plan  OT Plan: IADL retraining, ADL retraining, cognitive skills training, instruciton on compensatory tools/techniques/strategies, fatigue management  Frequency: 1x/week  Duration: 12 weeks  Onset Date: 21  Certification Period Start Date: 24  Certification Period End Date: 24  Number of Treatments Authorized: MN (today is visit 1)  Rehab Potential: Good  Plan of Care Agreement: Patient, Other (comment) (spouse)    Subjective   Patient reports that brain fog is worse and dry mouth is worse.  Patient had a CT scan yesterday of brain and reports that it came out normal. Patient reports that she will be getting IgG infusion, but is waiting for approval from Medicare. She has an appointment with a dry mouth specialist.     Patient goals:  Be able to manage dry mouth, talk like a human being, be able to walk, be able to think and communicate better, be able to write, go back to normal, be able to manage fatigue better.   Current Problem:  1. Cognitive deficits  Follow Up In Occupational Therapy      2. Post covid-19 condition, unspecified  Referral to Occupational Therapy    Follow Up In Occupational Therapy      3. Fatigue  Referral to Occupational Therapy    Follow Up In Occupational Therapy      4. Post-acute sequelae of COVID-19 (PASC)  Follow Up In Occupational Therapy        General:   OT Received On:  "03/13/24  General  Reason for Referral: fatigue and cognitive deficits related to long COVID  Referred By: Anila Abad NP  Past Medical History Relevant to Rehab: DM II, Graves Disease  Precautions:  Precautions  STEADI Fall Risk Score (The score of 4 or more indicates an increased risk of falling): 11  Hearing/Visual Limitations: WFL hearing, patient is senstitive to bright light  Medical Precautions: Fall precautions (DM2)       Pain Assessment:  Pain Assessment  Pain Assessment: 0-10  Pain Score: 0 - No pain    Objective   Cognition:  Cognition  Overall Cognitive Status: Impaired (Patient reports periods of being \"hyper\")  Attention: Exceptions to WFL  Alternating Attention: Impaired  Divided Attention: Impaired  Selective Attention: Impaired  Sustained Attention: Impaired  Short-Term Memory: Impaired  Working Memory: Impaired  Problem Solving: Exceptions to WFL  Complex Functional Tasks: Impaired  Managing Finances: Impaired  Processing Speed: DelayedPatient wakes up ok, she reports feeling like she is having an out of body experience when she gets up and starts walking around.  Brain fog is much worse when patient is tired.        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    Writing is not legible.      IADL: Patient is dependent on  for cooking, cleaning, paying bills.   Patient has no leisure participation at this time.            Mobility/Transfer:    has to provide verbal cues for functional mobility, step length.   Strength:  Able to  10 pound only, difficulty opening a bottle.  Reports that arms are restless.    Patient has theraband and theraputty at home.        Outcome Measures:     24 1500   OT Adult Other Outcome Measures   Other Outcome Measures Quick Dash 56.8 / 100 = 56.8 %, McKean cognitive Ampac: 55, Modified fatigue impact scale Score = 59 out of 84 points     MFIS Subscales:       - Physical functionin/36 points       - Cognitive functionin/40 points       - Psychosocial functionin/8 points       OP EDUCATION:  Education  Individual(s) Educated: Patient, Spouse  Education Provided:  (fatigue traffic light)  Home Program:  (fatigue tracking)  Risk and Benefits Discussed with Patient/Caregiver/Other: yes  Patient/Caregiver Demonstrated Understanding: yes  Plan of Care Discussed and Agreed Upon: yes  Patient Response to Education: Patient/Caregiver Asked Appropriate Questions    Goals:  Active       OT Problem       OT-Patient will manage fatigue independently through use of fatigue traffic light and other fatigue strategies to allow participation in ADL/IADL/leisure with fatigue rating of 4/10(yellow) or less.        Start:  24    Expected End:  24            Patient will complete complex IADL independently using cognitive and fatigue management strategies        Start:  24    Expected End:  24            OT- Patient will use memory and planning system independently as a compensatory tool for management of day, memory and planning.         Start:  03/13/24    Expected End:  06/14/24            Patient will use memory strategies independently to allow improved ability to manage appointments, remember errands,and to improve working memory.       Start:  03/13/24    Expected End:  06/14/24            OT-Patient will demonstrate good recall and understanding of written material through use of PQRST and other reading strategies.        Start:  03/13/24    Expected End:  06/14/24            Patient will complete self care with use of compensatory tools/techniques and energy conservation techniques to allow decreased fatigue..       Start:  03/13/24    Expected End:  06/14/24            Patient will demonstrate improved writing demonstrating 25% improvement in legibility and ability to mentally process writing.       Start:  03/13/24    Expected End:  06/14/24            Patient will demonstrated improved functional communication through use of compensatory tools/techniques and improved executive function.       Start:  03/13/24    Expected End:  06/14/24            Patient will participate in Leisure tasks independently including walking through use of fatigue management, energy conservation and use of compensatory tools/techniques.       Start:  03/13/24    Expected End:  06/14/24            Patient with demonstrate 4+/5 bilateral UE strength and independent HEP completion.       Start:  03/13/24                  Tx:   Looked at energy levels in terms of battery. Patient wakes up at full power, but uses half a battery to bath, dress, shower. When napping, patient can gain back about a quarter of a battery. She does not have any battery left and in fact has used more than available.      Home program:  Have patient identify how much battery power each of her daily tasks use and record.

## 2024-03-15 ENCOUNTER — APPOINTMENT (OUTPATIENT)
Dept: ENDOCRINOLOGY | Facility: CLINIC | Age: 69
End: 2024-03-15
Payer: MEDICARE

## 2024-03-18 ENCOUNTER — PATIENT MESSAGE (OUTPATIENT)
Dept: PRIMARY CARE | Facility: CLINIC | Age: 69
End: 2024-03-18
Payer: MEDICARE

## 2024-04-02 ENCOUNTER — TELEMEDICINE CLINICAL SUPPORT (OUTPATIENT)
Dept: OCCUPATIONAL THERAPY | Facility: HOSPITAL | Age: 69
End: 2024-04-02
Payer: MEDICARE

## 2024-04-02 DIAGNOSIS — R41.89 COGNITIVE DEFICITS: Primary | ICD-10-CM

## 2024-04-02 DIAGNOSIS — R53.83 FATIGUE, UNSPECIFIED TYPE: ICD-10-CM

## 2024-04-02 DIAGNOSIS — U09.9 POST-ACUTE SEQUELAE OF COVID-19 (PASC): ICD-10-CM

## 2024-04-02 PROCEDURE — 97535 SELF CARE MNGMENT TRAINING: CPT | Mod: GO,95 | Performed by: OCCUPATIONAL THERAPIST

## 2024-04-02 ASSESSMENT — ACTIVITIES OF DAILY LIVING (ADL): HOME_MANAGEMENT_TIME_ENTRY: 58

## 2024-04-02 NOTE — PROGRESS NOTES
Occupational Therapy    Occupational Therapy Treatment    Name: Юлия Ku  MRN: [id number]  : 1955  Date: 24  Time Calculation  Start Time: 1400  Stop Time: 1458  Time Calculation (min): 58 min    Assessment: Patient struggling with loss of previous roles due to fatigue levels. She indicates that each day is the same now.  Encouraged patient to begin to explore new self care activity that could be restorative emotionally and in terms of fatigue. Patient was receptive to instruction on use of reading strategies and did better with use of who, what, when, where, why and how technique.       Plan: Continued OT indicated.   Onset Date: 21  Certification Period Start Date: 24  Certification Period End Date: 24  Number of Treatments Authorized: MN (today is visit 2)    Subjective   Patient reports that she has been sleeping all week. Patient found that she was able to get back to approximately 80% after resting when using battery theory for fatigue management.    General:  OT Last Visit  OT Received On: 24  General  Reason for Referral: fatigue and cognitive deficits related to long COVID  Referred By: Anila Abad NP  Past Medical History Relevant to Rehab: DM II, Graves Disease  Precautions:  Hearing/Visual Limitations: WFL hearing, patient is senstitive to bright light  Medical Precautions: Fall precautions (DM2)  Pain Assessment: Patient denied pain.       Objective    IADL's:   Patient has established a schedule to help manage her fatigue. She however reports missing things to do. Her typical day is as follows:  Bathroom  Shower  Dress  Eat  Rest  Lunch  Rest  Eat dinner  Watch tv  Get ready for bed    Patient however reports that nothing is restorative besides napping.   Patient introduced to self care wheel.  She was asked to list self care tasks she would have done in the past for each of the domains and to identify possible self care tasks she could complete now.    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  Discussed potential for discovering new interests.    Patient frustrated with difficulty remembering events of the day- recommended use of planner, patient is currently listing appointments, how she feels, good day or bad day, recommended addition of journal section to allow patient to look back and review her day, patient can not read her writing however- suggested use of computer instead  Pictures and review daily  Emotional:    Reading- Provided instruction on pqrst and who, what, when, where, why, how, also recommend that patient take notes and complete a book club journal    Home program: come up with at least 3 things you would like to do.   start with 10-15 minutes of reading, use who, what, when, where, why, how and take notes  Start to think a little more about self care  OP EDUCATION:       Goals:  Active       OT Problem       OT-Patient will manage fatigue independently through use of fatigue traffic light and other fatigue strategies to allow participation in ADL/IADL/leisure with fatigue rating of 4/10(yellow) or less.        Start:  03/13/24    Expected End:  06/14/24            Patient will complete complex IADL independently using cognitive and fatigue management strategies        Start:   03/13/24    Expected End:  06/14/24            OT- Patient will use memory and planning system independently as a compensatory tool for management of day, memory and planning.        Start:  03/13/24    Expected End:  06/14/24            Patient will use memory strategies independently to allow improved ability to manage appointments, remember errands,and to improve working memory.       Start:  03/13/24    Expected End:  06/14/24            OT-Patient will demonstrate good recall and understanding of written material through use of PQRST and other reading strategies.        Start:  03/13/24    Expected End:  06/14/24            Patient will complete self care with use of compensatory tools/techniques and energy conservation techniques to allow decreased fatigue..       Start:  03/13/24    Expected End:  06/14/24            Patient will demonstrate improved writing demonstrating 25% improvement in legibility and ability to mentally process writing.       Start:  03/13/24    Expected End:  06/14/24            Patient will demonstrated improved functional communication through use of compensatory tools/techniques and improved executive function.       Start:  03/13/24    Expected End:  06/14/24            Patient will participate in Leisure tasks independently including walking through use of fatigue management, energy conservation and use of compensatory tools/techniques.       Start:  03/13/24    Expected End:  06/14/24            Patient with demonstrate 4+/5 bilateral UE strength and independent HEP completion.       Start:  03/13/24

## 2024-04-04 ENCOUNTER — OFFICE VISIT (OUTPATIENT)
Dept: ENDOCRINOLOGY | Facility: CLINIC | Age: 69
End: 2024-04-04
Payer: MEDICARE

## 2024-04-04 VITALS
SYSTOLIC BLOOD PRESSURE: 150 MMHG | BODY MASS INDEX: 29.44 KG/M2 | HEART RATE: 93 BPM | WEIGHT: 160.94 LBS | DIASTOLIC BLOOD PRESSURE: 92 MMHG

## 2024-04-04 DIAGNOSIS — E89.0 POSTABLATIVE HYPOTHYROIDISM: ICD-10-CM

## 2024-04-04 PROCEDURE — 1160F RVW MEDS BY RX/DR IN RCRD: CPT | Performed by: INTERNAL MEDICINE

## 2024-04-04 PROCEDURE — 3077F SYST BP >= 140 MM HG: CPT | Performed by: INTERNAL MEDICINE

## 2024-04-04 PROCEDURE — 1036F TOBACCO NON-USER: CPT | Performed by: INTERNAL MEDICINE

## 2024-04-04 PROCEDURE — 99214 OFFICE O/P EST MOD 30 MIN: CPT | Performed by: INTERNAL MEDICINE

## 2024-04-04 PROCEDURE — 4010F ACE/ARB THERAPY RXD/TAKEN: CPT | Performed by: INTERNAL MEDICINE

## 2024-04-04 PROCEDURE — 1159F MED LIST DOCD IN RCRD: CPT | Performed by: INTERNAL MEDICINE

## 2024-04-04 PROCEDURE — 3080F DIAST BP >= 90 MM HG: CPT | Performed by: INTERNAL MEDICINE

## 2024-04-04 ASSESSMENT — ENCOUNTER SYMPTOMS
UNEXPECTED WEIGHT CHANGE: 1
CONFUSION: 1
SHORTNESS OF BREATH: 0
WEAKNESS: 1
DIFFICULTY URINATING: 0
FATIGUE: 1
DIZZINESS: 1
ENDOCRINE COMMENTS: AS ABOVE
ARTHRALGIAS: 1
NERVOUS/ANXIOUS: 1

## 2024-04-04 NOTE — PATIENT INSTRUCTIONS
RECOMMENDATIONS  Add TSH to next labs for Dr. Dupree  Results available on [employer]  Call/message if you have not received results in 3 days.     Take levothyroxine on an empty stomach with water alone, 1 hour before eating or taking other medications, 4 hours before any calcium or iron supplement.    Follow up August 2024  Repeat TSH before next appointment

## 2024-04-04 NOTE — PROGRESS NOTES
History Of Present Illness  Юлия Ku is a 68 y.o. female with postablative hypothyroidism  She has been on levothyroxine since 2009  Graves' disease, 131-iodine therapy in 2008    Former patient of Dr. FREDDIE Ingram, [facility]    She questions whether she has hypo- or hyperthyroidism    She has been jittery  Brain fog    Levoxyl 100 mcg/day  Patient is taking levothyroxine on an empty stomach with water alone.    She had been hypothyroid last summer, and thinks she was taking half her thyroid medication.   No history of goiter or neck mass.     History of COVID-19 infection January 2021 with continued Long-COVID      Past Medical History  She has a past medical history of Disease of thyroid gland (graves 2008), Fatigue (12/2023), Personal history of irradiation, Personal history of other diseases of the circulatory system, Personal history of other endocrine, nutritional and metabolic disease, Personal history of other endocrine, nutritional and metabolic disease, Personal history of other endocrine, nutritional and metabolic disease, and Personal history of other endocrine, nutritional and metabolic disease.    Surgical History  She has a past surgical history that includes Other surgical history (01/12/2021) and Breast biopsy (5/2008).     Social History  She reports that she quit smoking about 45 years ago. Her smoking use included cigarettes. She started smoking about 47 years ago. She has a 0.50 pack-year smoking history. She has never used smokeless tobacco. She reports that she does not currently use alcohol. She reports that she does not use drugs.    Family History  Family History   Problem Relation Name Age of Onset    Alzheimer's disease Mother Sandeep Lambert     Hypertension Mother Sandeep Lambert     Cancer Mother Sandeep Lambert     Breast cancer Mother Sandeep Lambert     Heart disease Maternal Grandfather      Hypertension Father Chichi Youngs     Kidney disease Father Chichi Sanderskerrie        Medications  Current Outpatient  Medications   Medication Instructions    carbidopa-levodopa (Parcopa)  mg disintegrating tablet 0.5 tablets, oral, 3 times daily    cholecalciferol, vitD3,/vit K2 (vitamin D3-vitamin K2) 250 mcg (10,000 unit)-45 mcg capsule oral    cycloSPORINE (Restasis) 0.05 % ophthalmic emulsion 1 drop, Both Eyes, Every 12 hours    FLUoxetine (PROZAC) 40 mg, oral, Daily RT    FreeStyle Srinivas 3 Sensor device USE AS DIRECTED TO CHECK BLOOD GLUCOSE. CHANGE EVERY 14 DAYS    insulin degludec (TRESIBA U-100 INSULIN SUBQ) 10 Units, subcutaneous, Every 7 days, Take as directed per insulin instructions.    levothyroxine (SYNTHROID, LEVOXYL) 100 mcg, oral, Daily    losartan (COZAAR) 50 mg, oral, Daily    metFORMIN XR (GLUCOPHAGE-XR) 500 mg, oral, Daily with evening meal    MINERALS ORAL 1 tablet, oral, Daily    NON FORMULARY Glia Plasmalogens    NON FORMULARY 2 each, oral, Daily, Ferrasorb    omega-3 fatty acids (SUPER OMEGA-3 ORAL) oral    pilocarpine (Salagen) 5 mg tablet TAKE 1 TABLET(5 MG) BY MOUTH THREE TIMES DAILY    PreviDent 5000 Dry Mouth 1.1 % dental paste dental, Daily    rosuvastatin (CRESTOR) 10 mg, oral, Nightly       Allergies  Mold, Codeine, and Lisinopril    Review of Systems   Constitutional:  Positive for fatigue and unexpected weight change (loss).   HENT:          Dry mouth   Eyes:  Negative for visual disturbance.   Respiratory:  Negative for shortness of breath.    Cardiovascular:  Negative for chest pain.   Endocrine:        As above   Genitourinary:  Negative for difficulty urinating.   Musculoskeletal:  Positive for arthralgias.   Skin:  Negative for rash.   Neurological:  Positive for dizziness and weakness.   Psychiatric/Behavioral:  Positive for confusion. The patient is nervous/anxious.          Last Recorded Vitals  Blood pressure (!) 150/92, pulse 93, weight 73 kg (160 lb 15 oz).    Physical Exam  HENT:      Mouth/Throat:      Mouth: Mucous membranes are moist.   Eyes:      Extraocular Movements:  Extraocular movements intact.   Neck:      Comments: No palpable thyroid  Cardiovascular:      Pulses:           Radial pulses are 2+ on the right side and 2+ on the left side.   Musculoskeletal:      Right lower leg: No edema.      Left lower leg: No edema.   Lymphadenopathy:      Cervical: No cervical adenopathy.   Neurological:      Mental Status: She is alert.      Motor: No tremor.   Psychiatric:         Mood and Affect: Affect normal.          Relevant Results  Lab Results   Component Value Date    TSH 0.75 09/07/2023    FREET4 0.80 07/25/2023    THYROIDPAB <28 06/02/2023 12/19/23:  TSH 15.3 mcIU/ml      IMPRESSION  HYPOTHYROIDISM, POSTABLATIVE  Advised she certainly has hypothyroidism, no evidence of conversion back to hyperthyroidism  No residual goiter  Many symptoms of Long-COVID, confusion, brain fog      RECOMMENDATIONS  Add TSH to next labs for Dr. Dupree  Results available on [employer]  Call/message if you have not received results in 3 days.     Take levothyroxine on an empty stomach with water alone, 1 hour before eating or taking other medications, 4 hours before any calcium or iron supplement.    Follow up August 2024  Repeat TSH before next appointment

## 2024-04-04 NOTE — LETTER
April 4, 2024     ANTONELLA Ramos-CNP  [address]  [facility], [facility] [address]  [place] [address]    Patient: Юлия Ku   YOB: 1955   Date of Visit: 4/4/2024       Dear AMANDEEP Morris:    Thank you for referring Юлия Ku to me for evaluation. Below are my notes for this consultation.  If you have questions, please do not hesitate to call me. I look forward to following your patient along with you.       Sincerely,     Jered Rosales MD      CC: Bibiana Dupree, DO  ______________________________________________________________________________________    History Of Present Illness  Юлия Ku is a 68 y.o. female with postablative hypothyroidism  She has been on levothyroxine since 2009  Graves' disease, 131-iodine therapy in 2008    Former patient of Dr. FREDDIE Ingram, [facility]    She questions whether she has hypo- or hyperthyroidism    She has been jittery  Brain fog    Levoxyl 100 mcg/day  Patient is taking levothyroxine on an empty stomach with water alone.    She had been hypothyroid last summer, and thinks she was taking half her thyroid medication.   No history of goiter or neck mass.     History of COVID-19 infection January 2021 with continued Long-COVID      Past Medical History  She has a past medical history of Disease of thyroid gland (graves 2008), Fatigue (12/2023), Personal history of irradiation, Personal history of other diseases of the circulatory system, Personal history of other endocrine, nutritional and metabolic disease, Personal history of other endocrine, nutritional and metabolic disease, Personal history of other endocrine, nutritional and metabolic disease, and Personal history of other endocrine, nutritional and metabolic disease.    Surgical History  She has a past surgical history that includes Other surgical history (01/12/2021) and Breast biopsy (5/2008).     Social History  She reports that she quit smoking about 45 years  ago. Her smoking use included cigarettes. She started smoking about 47 years ago. She has a 0.50 pack-year smoking history. She has never used smokeless tobacco. She reports that she does not currently use alcohol. She reports that she does not use drugs.    Family History  Family History   Problem Relation Name Age of Onset   • Alzheimer's disease Mother Sandeep Lambert    • Hypertension Mother Sandeep Lambert    • Cancer Mother Sandeep Lambert    • Breast cancer Mother Sandeep Lambert    • Heart disease Maternal Grandfather     • Hypertension Father Chichi Lambert    • Kidney disease Father Chichi Lambert        Medications  Current Outpatient Medications   Medication Instructions   • carbidopa-levodopa (Parcopa)  mg disintegrating tablet 0.5 tablets, oral, 3 times daily   • cholecalciferol, vitD3,/vit K2 (vitamin D3-vitamin K2) 250 mcg (10,000 unit)-45 mcg capsule oral   • cycloSPORINE (Restasis) 0.05 % ophthalmic emulsion 1 drop, Both Eyes, Every 12 hours   • FLUoxetine (PROZAC) 40 mg, oral, Daily RT   • FreeStyle Srinivas 3 Sensor device USE AS DIRECTED TO CHECK BLOOD GLUCOSE. CHANGE EVERY 14 DAYS   • insulin degludec (TRESIBA U-100 INSULIN SUBQ) 10 Units, subcutaneous, Every 7 days, Take as directed per insulin instructions.   • levothyroxine (SYNTHROID, LEVOXYL) 100 mcg, oral, Daily   • losartan (COZAAR) 50 mg, oral, Daily   • metFORMIN XR (GLUCOPHAGE-XR) 500 mg, oral, Daily with evening meal   • MINERALS ORAL 1 tablet, oral, Daily   • NON FORMULARY Glia Plasmalogens   • NON FORMULARY 2 each, oral, Daily, Ferrasorb   • omega-3 fatty acids (SUPER OMEGA-3 ORAL) oral   • pilocarpine (Salagen) 5 mg tablet TAKE 1 TABLET(5 MG) BY MOUTH THREE TIMES DAILY   • PreviDent 5000 Dry Mouth 1.1 % dental paste dental, Daily   • rosuvastatin (CRESTOR) 10 mg, oral, Nightly       Allergies  Mold, Codeine, and Lisinopril    Review of Systems   Constitutional:  Positive for fatigue and unexpected weight change (loss).   HENT:          Dry mouth    Eyes:  Negative for visual disturbance.   Respiratory:  Negative for shortness of breath.    Cardiovascular:  Negative for chest pain.   Endocrine:        As above   Genitourinary:  Negative for difficulty urinating.   Musculoskeletal:  Positive for arthralgias.   Skin:  Negative for rash.   Neurological:  Positive for dizziness and weakness.   Psychiatric/Behavioral:  Positive for confusion. The patient is nervous/anxious.          Last Recorded Vitals  Blood pressure (!) 150/92, pulse 93, weight 73 kg (160 lb 15 oz).    Physical Exam  HENT:      Mouth/Throat:      Mouth: Mucous membranes are moist.   Eyes:      Extraocular Movements: Extraocular movements intact.   Neck:      Comments: No palpable thyroid  Cardiovascular:      Pulses:           Radial pulses are 2+ on the right side and 2+ on the left side.   Musculoskeletal:      Right lower leg: No edema.      Left lower leg: No edema.   Lymphadenopathy:      Cervical: No cervical adenopathy.   Neurological:      Mental Status: She is alert.      Motor: No tremor.   Psychiatric:         Mood and Affect: Affect normal.          Relevant Results  Lab Results   Component Value Date    TSH 0.75 09/07/2023    FREET4 0.80 07/25/2023    THYROIDPAB <28 06/02/2023 12/19/23:  TSH 15.3 mcIU/ml      IMPRESSION  HYPOTHYROIDISM, POSTABLATIVE  Advised she certainly has hypothyroidism, no evidence of conversion back to hyperthyroidism  No residual goiter  Many symptoms of Long-COVID, confusion, brain fog      RECOMMENDATIONS  Add TSH to next labs for Dr. Dupree  Results available on [facility]  Call/message if you have not received results in 3 days.     Take levothyroxine on an empty stomach with water alone, 1 hour before eating or taking other medications, 4 hours before any calcium or iron supplement.    Follow up August 2024  Repeat TSH before next appointment

## 2024-04-09 ENCOUNTER — APPOINTMENT (OUTPATIENT)
Dept: OCCUPATIONAL THERAPY | Facility: HOSPITAL | Age: 69
End: 2024-04-09
Payer: MEDICARE

## 2024-04-09 ENCOUNTER — LAB (OUTPATIENT)
Dept: LAB | Facility: LAB | Age: 69
End: 2024-04-09
Payer: MEDICARE

## 2024-04-09 DIAGNOSIS — E78.5 HYPERLIPIDEMIA, UNSPECIFIED HYPERLIPIDEMIA TYPE: ICD-10-CM

## 2024-04-09 DIAGNOSIS — E89.0 POSTABLATIVE HYPOTHYROIDISM: ICD-10-CM

## 2024-04-09 DIAGNOSIS — I10 BENIGN ESSENTIAL HYPERTENSION: ICD-10-CM

## 2024-04-09 LAB
ALBUMIN SERPL BCP-MCNC: 4.3 G/DL (ref 3.4–5)
ALP SERPL-CCNC: 46 U/L (ref 33–136)
ALT SERPL W P-5'-P-CCNC: 6 U/L (ref 7–45)
ANION GAP SERPL CALC-SCNC: 13 MMOL/L (ref 10–20)
AST SERPL W P-5'-P-CCNC: 13 U/L (ref 9–39)
BASOPHILS # BLD AUTO: 0.03 X10*3/UL (ref 0–0.1)
BASOPHILS NFR BLD AUTO: 0.4 %
BILIRUB SERPL-MCNC: 0.7 MG/DL (ref 0–1.2)
BUN SERPL-MCNC: 19 MG/DL (ref 6–23)
CALCIUM SERPL-MCNC: 9.5 MG/DL (ref 8.6–10.3)
CHLORIDE SERPL-SCNC: 103 MMOL/L (ref 98–107)
CHOLEST SERPL-MCNC: 224 MG/DL (ref 0–199)
CHOLESTEROL/HDL RATIO: 4
CO2 SERPL-SCNC: 25 MMOL/L (ref 21–32)
CREAT SERPL-MCNC: 0.82 MG/DL (ref 0.5–1.05)
EGFRCR SERPLBLD CKD-EPI 2021: 79 ML/MIN/1.73M*2
EOSINOPHIL # BLD AUTO: 0.08 X10*3/UL (ref 0–0.7)
EOSINOPHIL NFR BLD AUTO: 1.2 %
ERYTHROCYTE [DISTWIDTH] IN BLOOD BY AUTOMATED COUNT: 12.8 % (ref 11.5–14.5)
GLUCOSE SERPL-MCNC: 114 MG/DL (ref 74–99)
HCT VFR BLD AUTO: 46 % (ref 36–46)
HDLC SERPL-MCNC: 55.7 MG/DL
HGB BLD-MCNC: 15.3 G/DL (ref 12–16)
IMM GRANULOCYTES # BLD AUTO: 0.02 X10*3/UL (ref 0–0.7)
IMM GRANULOCYTES NFR BLD AUTO: 0.3 % (ref 0–0.9)
LDLC SERPL CALC-MCNC: 117 MG/DL
LYMPHOCYTES # BLD AUTO: 1.28 X10*3/UL (ref 1.2–4.8)
LYMPHOCYTES NFR BLD AUTO: 18.4 %
MCH RBC QN AUTO: 32.6 PG (ref 26–34)
MCHC RBC AUTO-ENTMCNC: 33.3 G/DL (ref 32–36)
MCV RBC AUTO: 98 FL (ref 80–100)
MONOCYTES # BLD AUTO: 0.45 X10*3/UL (ref 0.1–1)
MONOCYTES NFR BLD AUTO: 6.5 %
NEUTROPHILS # BLD AUTO: 5.09 X10*3/UL (ref 1.2–7.7)
NEUTROPHILS NFR BLD AUTO: 73.2 %
NON HDL CHOLESTEROL: 168 MG/DL (ref 0–149)
NRBC BLD-RTO: 0 /100 WBCS (ref 0–0)
PLATELET # BLD AUTO: 308 X10*3/UL (ref 150–450)
POTASSIUM SERPL-SCNC: 4.4 MMOL/L (ref 3.5–5.3)
PROT SERPL-MCNC: 6.4 G/DL (ref 6.4–8.2)
RBC # BLD AUTO: 4.69 X10*6/UL (ref 4–5.2)
SODIUM SERPL-SCNC: 137 MMOL/L (ref 136–145)
TRIGL SERPL-MCNC: 255 MG/DL (ref 0–149)
TSH SERPL-ACNC: 7.69 MIU/L (ref 0.44–3.98)
VLDL: 51 MG/DL (ref 0–40)
WBC # BLD AUTO: 7 X10*3/UL (ref 4.4–11.3)

## 2024-04-09 PROCEDURE — 36415 COLL VENOUS BLD VENIPUNCTURE: CPT

## 2024-04-09 PROCEDURE — 80061 LIPID PANEL: CPT

## 2024-04-09 PROCEDURE — 84443 ASSAY THYROID STIM HORMONE: CPT

## 2024-04-09 PROCEDURE — 85025 COMPLETE CBC W/AUTO DIFF WBC: CPT

## 2024-04-09 PROCEDURE — 80053 COMPREHEN METABOLIC PANEL: CPT

## 2024-04-11 DIAGNOSIS — E03.9 HYPOTHYROIDISM, UNSPECIFIED TYPE: ICD-10-CM

## 2024-04-11 RX ORDER — LEVOTHYROXINE SODIUM 112 UG/1
112 TABLET ORAL DAILY
Qty: 90 TABLET | Refills: 3 | Status: SHIPPED | OUTPATIENT
Start: 2024-04-11 | End: 2025-04-11

## 2024-04-15 SDOH — ECONOMIC STABILITY: HOUSING INSECURITY
IN THE LAST 12 MONTHS, WAS THERE A TIME WHEN YOU DID NOT HAVE A STEADY PLACE TO SLEEP OR SLEPT IN A SHELTER (INCLUDING NOW)?: NO

## 2024-04-15 SDOH — HEALTH STABILITY: PHYSICAL HEALTH: ON AVERAGE, HOW MANY DAYS PER WEEK DO YOU ENGAGE IN MODERATE TO STRENUOUS EXERCISE (LIKE A BRISK WALK)?: 2 DAYS

## 2024-04-15 SDOH — ECONOMIC STABILITY: INCOME INSECURITY: IN THE LAST 12 MONTHS, WAS THERE A TIME WHEN YOU WERE NOT ABLE TO PAY THE MORTGAGE OR RENT ON TIME?: NO

## 2024-04-15 SDOH — ECONOMIC STABILITY: HOUSING INSECURITY: IN THE LAST 12 MONTHS, HOW MANY PLACES HAVE YOU LIVED?: 1

## 2024-04-15 SDOH — HEALTH STABILITY: MENTAL HEALTH
STRESS IS WHEN SOMEONE FEELS TENSE, NERVOUS, ANXIOUS, OR CAN'T SLEEP AT NIGHT BECAUSE THEIR MIND IS TROUBLED. HOW STRESSED ARE YOU?: ONLY A LITTLE

## 2024-04-15 SDOH — ECONOMIC STABILITY: FOOD INSECURITY: WITHIN THE PAST 12 MONTHS, YOU WORRIED THAT YOUR FOOD WOULD RUN OUT BEFORE YOU GOT MONEY TO BUY MORE.: NEVER TRUE

## 2024-04-15 SDOH — ECONOMIC STABILITY: FOOD INSECURITY: WITHIN THE PAST 12 MONTHS, THE FOOD YOU BOUGHT JUST DIDN’T LAST AND YOU DIDN’T HAVE MONEY TO GET MORE.: NEVER TRUE

## 2024-04-15 SDOH — ECONOMIC STABILITY: FOOD INSECURITY: WITHIN THE PAST 12 MONTHS, THE FOOD YOU BOUGHT JUST DIDN'T LAST AND YOU DIDN'T HAVE MONEY TO GET MORE.: NEVER TRUE

## 2024-04-15 SDOH — ECONOMIC STABILITY: INCOME INSECURITY: HOW HARD IS IT FOR YOU TO PAY FOR THE VERY BASICS LIKE FOOD, HOUSING, MEDICAL CARE, AND HEATING?: NOT HARD AT ALL

## 2024-04-15 SDOH — HEALTH STABILITY: PHYSICAL HEALTH
HOW OFTEN DO YOU NEED TO HAVE SOMEONE HELP YOU WHEN YOU READ INSTRUCTIONS, PAMPHLETS, OR OTHER WRITTEN MATERIAL FROM YOUR DOCTOR OR PHARMACY?: RARELY

## 2024-04-15 SDOH — ECONOMIC STABILITY: TRANSPORTATION INSECURITY
IN THE PAST 12 MONTHS, HAS THE LACK OF TRANSPORTATION KEPT YOU FROM MEDICAL APPOINTMENTS OR FROM GETTING MEDICATIONS?: NO

## 2024-04-15 SDOH — ECONOMIC STABILITY: TRANSPORTATION INSECURITY
IN THE PAST 12 MONTHS, HAS LACK OF TRANSPORTATION KEPT YOU FROM MEETINGS, WORK, OR FROM GETTING THINGS NEEDED FOR DAILY LIVING?: NO

## 2024-04-15 SDOH — HEALTH STABILITY: PHYSICAL HEALTH: ON AVERAGE, HOW MANY MINUTES DO YOU ENGAGE IN EXERCISE AT THIS LEVEL?: 20 MIN

## 2024-04-15 ASSESSMENT — LIFESTYLE VARIABLES
DO_YOU_DRINK?: I DID NOT DO THIS BEFORE COVID-19
HOW OFTEN DO YOU HAVE A DRINK CONTAINING ALCOHOL: NEVER
HOW OFTEN DO YOU HAVE SIX OR MORE DRINKS ON ONE OCCASION: NEVER
SKIP TO QUESTIONS 9-10: 1
HOW OFTEN DO YOU HAVE A DRINK CONTAINING ALCOHOL: NEVER
HOW MANY STANDARD DRINKS CONTAINING ALCOHOL DO YOU HAVE ON A TYPICAL DAY: PATIENT DOES NOT DRINK
USE_ALCOHOL_TO_HELP_SLEEP: NO
AUDIT-C TOTAL SCORE: 0
HOW MANY STANDARD DRINKS CONTAINING ALCOHOL DO YOU HAVE ON A TYPICAL DAY: PATIENT DOES NOT DRINK
HOW OFTEN DO YOU HAVE 6 OR MORE DRINKS ON ONE OCCASION: NEVER

## 2024-04-15 ASSESSMENT — PATIENT HEALTH QUESTIONNAIRE - PHQ9
8. MOVING OR SPEAKING SO SLOWLY THAT OTHER PEOPLE COULD HAVE NOTICED. OR THE OPPOSITE - BEING SO FIDGETY OR RESTLESS THAT YOU HAVE BEEN MOVING AROUND A LOT MORE THAN USUAL: NOT AT ALL
SUM OF ALL RESPONSES TO PHQ QUESTIONS 1-9: 4
4. FEELING TIRED OR HAVING LITTLE ENERGY: SEVERAL DAYS
6. FEELING BAD ABOUT YOURSELF - OR THAT YOU ARE A FAILURE OR HAVE LET YOURSELF OR YOUR FAMILY DOWN: NOT AT ALL
9. THOUGHTS THAT YOU WOULD BE BETTER OFF DEAD, OR OF HURTING YOURSELF: NOT AT ALL
9. THOUGHTS THAT YOU WOULD BE BETTER OFF DEAD, OR OF HURTING YOURSELF: NOT AT ALL
6. FEELING BAD ABOUT YOURSELF - OR THAT YOU ARE A FAILURE OR HAVE LET YOURSELF OR YOUR FAMILY DOWN: NOT AT ALL
5. POOR APPETITE OR OVEREATING: NOT AT ALL
2. FEELING DOWN, DEPRESSED OR HOPELESS: SEVERAL DAYS
2. FEELING DOWN, DEPRESSED OR HOPELESS: SEVERAL DAYS
7. TROUBLE CONCENTRATING ON THINGS, SUCH AS READING THE NEWSPAPER OR WATCHING TELEVISION: SEVERAL DAYS
3. TROUBLE FALLING OR STAYING ASLEEP: SEVERAL DAYS
8. MOVING OR SPEAKING SO SLOWLY THAT OTHER PEOPLE COULD HAVE NOTICED. OR THE OPPOSITE, BEING SO FIGETY OR RESTLESS THAT YOU HAVE BEEN MOVING AROUND A LOT MORE THAN USUAL: NOT AT ALL
4. FEELING TIRED OR HAVING LITTLE ENERGY: SEVERAL DAYS
SUM OF ALL RESPONSES TO PHQ9 QUESTIONS 1 & 2: 1
1. LITTLE INTEREST OR PLEASURE IN DOING THINGS: NOT AT ALL
5. POOR APPETITE OR OVEREATING: NOT AT ALL
10. IF YOU CHECKED OFF ANY PROBLEMS, HOW DIFFICULT HAVE THESE PROBLEMS MADE IT FOR YOU TO DO YOUR WORK, TAKE CARE OF THINGS AT HOME, OR GET ALONG WITH OTHER PEOPLE: NOT DIFFICULT AT ALL
1. LITTLE INTEREST OR PLEASURE IN DOING THINGS: NOT AT ALL
7. TROUBLE CONCENTRATING ON THINGS, SUCH AS READING THE NEWSPAPER OR WATCHING TELEVISION: SEVERAL DAYS
3. TROUBLE FALLING OR STAYING ASLEEP OR SLEEPING TOO MUCH: SEVERAL DAYS
10. IF YOU CHECKED OFF ANY PROBLEMS, HOW DIFFICULT HAVE THESE PROBLEMS MADE IT FOR YOU TO DO YOUR WORK, TAKE CARE OF THINGS AT HOME, OR GET ALONG WITH OTHER PEOPLE: NOT DIFFICULT AT ALL

## 2024-04-15 ASSESSMENT — SLEEP AND FATIGUE QUESTIONNAIRES
EXERCISE_BRINGS_ON_FATIGUE: 7 STRONGLY AGREE
FATIGUE_CAUSES_FREQUENT_PROBLEMTS: 5
FATIGUE_MOST_DISABILING_SYMPTOM: 5
FATIGUE_INTERFERES_SOCIAL_LIFE: 5
AVERAGE_FSS_SCORE: 5.44
FATIGUE_INTERFERES_RESPONSIBILITIES: 5
MY MOTIVATION IS LOWER WHEN I AM FATIGUED.: 7 STRONGLY AGREE
MY MOTIVATION IS LOWER WHEN I AM FATIGUED.: 7 STRONGLY AGREE
FATIGUE_INTERFERES_PHYSICAL_FUNCTIONING: 5
EASILY_FATIGUED: 5
FATIGUE_MOST_DISABILING_SYMPTOM: 5
FATIGUE_INTERFERES_RESPONSIBILITIES: 5
VISUAL ANALOGUE FATIGUE SCALE (VAFS): 2
MY FATIGUE PREVENTS SUSTAINED PHYSICAL FUNCTIONING.: 5
FATIGUE_INTERFERES_PHYSICAL_FUNCTIONING: 5
FATIGUE_INTERFERES_SOCIAL_LIFE: 5
FATIGUE_CAUSES_FREQUENT_PROBLEMTS: 5
EXERCISE_BRINGS_ON_FATIGUE: 7 STRONGLY AGREE
MY FATIGUE PREVENTS SUSTAINED PHYSICAL FUNCTIONING.: 5
EASILY_FATIGUED: 5

## 2024-04-15 ASSESSMENT — ANXIETY QUESTIONNAIRES
1. FEELING NERVOUS, ANXIOUS, OR ON EDGE: SEVERAL DAYS
7. FEELING AFRAID AS IF SOMETHING AWFUL MIGHT HAPPEN: SEVERAL DAYS
6. BECOMING EASILY ANNOYED OR IRRITABLE: SEVERAL DAYS
5. BEING SO RESTLESS THAT IT IS HARD TO SIT STILL: SEVERAL DAYS
2. NOT BEING ABLE TO STOP OR CONTROL WORRYING: NOT AT ALL
1. FEELING NERVOUS, ANXIOUS, OR ON EDGE: SEVERAL DAYS
GAD7 TOTAL SCORE: 5
IF YOU CHECKED OFF ANY PROBLEMS ON THIS QUESTIONNAIRE, HOW DIFFICULT HAVE THESE PROBLEMS MADE IT FOR YOU TO DO YOUR WORK, TAKE CARE OF THINGS AT HOME, OR GET ALONG WITH OTHER PEOPLE: NOT DIFFICULT AT ALL
3. WORRYING TOO MUCH ABOUT DIFFERENT THINGS: NOT AT ALL
4. TROUBLE RELAXING: SEVERAL DAYS
5. BEING SO RESTLESS THAT IT IS HARD TO SIT STILL: SEVERAL DAYS
7. FEELING AFRAID AS IF SOMETHING AWFUL MIGHT HAPPEN: SEVERAL DAYS
3. WORRYING TOO MUCH ABOUT DIFFERENT THINGS: NOT AT ALL
6. BECOMING EASILY ANNOYED OR IRRITABLE: SEVERAL DAYS
2. NOT BEING ABLE TO STOP OR CONTROL WORRYING: NOT AT ALL
IF YOU CHECKED OFF ANY PROBLEMS ON THIS QUESTIONNAIRE, HOW DIFFICULT HAVE THESE PROBLEMS MADE IT FOR YOU TO DO YOUR WORK, TAKE CARE OF THINGS AT HOME, OR GET ALONG WITH OTHER PEOPLE: NOT DIFFICULT AT ALL
4. TROUBLE RELAXING: SEVERAL DAYS

## 2024-04-15 ASSESSMENT — SOCIAL DETERMINANTS OF HEALTH (SDOH)
DO YOU BELONG TO ANY CLUBS OR ORGANIZATIONS SUCH AS CHURCH GROUPS UNIONS, FRATERNAL OR ATHLETIC GROUPS, OR SCHOOL GROUPS?: NO
IN A TYPICAL WEEK, HOW MANY TIMES DO YOU TALK ON THE PHONE WITH FAMILY, FRIENDS, OR NEIGHBORS?: ONCE A WEEK
DO YOU BELONG TO ANY CLUBS OR ORGANIZATIONS SUCH AS CHURCH GROUPS UNIONS, FRATERNAL OR ATHLETIC GROUPS, OR SCHOOL GROUPS?: NO
IN A TYPICAL WEEK, HOW MANY TIMES DO YOU TALK ON THE PHONE WITH FAMILY, FRIENDS, OR NEIGHBORS?: ONCE A WEEK
HOW OFTEN DO YOU GET TOGETHER WITH FRIENDS OR RELATIVES?: ONCE A WEEK
IN THE PAST 12 MONTHS, HAS THE ELECTRIC, GAS, OIL, OR WATER COMPANY THREATENED TO SHUT OFF SERVICE IN YOUR HOME?: NO
HOW OFTEN DO YOU GET TOGETHER WITH FRIENDS OR RELATIVES?: ONCE A WEEK
HOW OFTEN DO YOU ATTEND CHURCH OR RELIGIOUS SERVICES?: 1 TO 4 TIMES PER YEAR
HOW OFTEN DO YOU ATTEND CHURCH OR RELIGIOUS SERVICES?: 1 TO 4 TIMES PER YEAR
IN THE PAST 12 MONTHS, HAS THE ELECTRIC, GAS, OIL, OR WATER COMPANY THREATENED TO SHUT OFF SERVICE IN YOUR HOME?: NO
HOW OFTEN DO YOU ATTENT MEETINGS OF THE CLUB OR ORGANIZATION YOU BELONG TO?: 1 TO 4 TIMES PER YEAR
HOW OFTEN DO YOU ATTENT MEETINGS OF THE CLUB OR ORGANIZATION YOU BELONG TO?: 1 TO 4 TIMES PER YEAR

## 2024-04-17 ENCOUNTER — TELEMEDICINE CLINICAL SUPPORT (OUTPATIENT)
Dept: OCCUPATIONAL THERAPY | Facility: HOSPITAL | Age: 69
End: 2024-04-17
Payer: MEDICARE

## 2024-04-17 DIAGNOSIS — R53.83 FATIGUE, UNSPECIFIED TYPE: ICD-10-CM

## 2024-04-17 DIAGNOSIS — I10 BENIGN ESSENTIAL HYPERTENSION: ICD-10-CM

## 2024-04-17 DIAGNOSIS — R41.89 COGNITIVE DEFICITS: Primary | ICD-10-CM

## 2024-04-17 DIAGNOSIS — U09.9 POST-ACUTE SEQUELAE OF COVID-19 (PASC): ICD-10-CM

## 2024-04-17 PROCEDURE — 97535 SELF CARE MNGMENT TRAINING: CPT | Mod: GO,95 | Performed by: OCCUPATIONAL THERAPIST

## 2024-04-17 RX ORDER — LOSARTAN POTASSIUM 50 MG/1
50 TABLET ORAL DAILY
Qty: 90 TABLET | Refills: 0 | Status: SHIPPED | OUTPATIENT
Start: 2024-04-17 | End: 2024-05-07 | Stop reason: SDUPTHER

## 2024-04-17 ASSESSMENT — ACTIVITIES OF DAILY LIVING (ADL): HOME_MANAGEMENT_TIME_ENTRY: 52

## 2024-04-17 NOTE — PROGRESS NOTES
"Occupational Therapy    Occupational Therapy Treatment    Name: Юлия Ku  MRN: [id number]  : 1955  Date: 24  Time Calculation  Start Time: 1707  Stop Time: 1759  Time Calculation (min): 52 min    Assessment:  Patient receptive to instruction on compensatory memory techniques.  Lack of confidence as well as fatigue and brain fog contributing to impaired participation in meaningful activity.   Continued OT indicated.     Plan:  Continue OT tx.   Onset Date: 21  Certification Period Start Date: 24  Certification Period End Date: 24  Number of Treatments Authorized: MN (today is visit 3)    Subjective   Patient had a gamma globlobulin infusion yesterday.  She indicates that it is not helping much. She indicates that it was the first one and after a month, it is supposed to help.  Patient reports lot of brain fogginess right now, she reports that it is getting worse every day. She describes the fogginess as -\"No thoughts, no memory\".  General:  OT Last Visit  OT Received On: 24  General  Reason for Referral: fatigue and cognitive deficits related to long COVID  Referred By: Anila Abad  Past Medical History Relevant to Rehab: DM II, Graves Disease  Precautions:  Medical Precautions: Fall precautions (DM2)  Pain Assessment:   No pain reported today.    Objective       IADL's: memory strategies- patient reports taking notes  Provided instruction on internal and external memory strategies.     Patient participated in making links. She identified 8/10 words correctly after associating another word with the original. Also worked on associating a new task-exercises, remembering therapists name, and using place to recall a task- patient linked placing medications on the table to remember them.     Also provided instruction on chunking.  Patient given a list of groceries. She identified only two groups- food and supplies.  Provided modeling of how to break groups down further.  " Patient asked to reexamine list and to regroup.  She was then asked to name groceries.  She was able to recall 7/8 items. She also participated in chunking chores.  Patient was able to recall 5/6.      Patient identified 3 things she would do if she could- gardening, journaling, traveling.  Shared journaling types.  Patient expressed interest in reflective journaling and video journaling.    Having high level of fatigue 8/10- tried positive imagery, fatigue remained 8/10    Provided journal prompts of identifying 5 things she is good at. She had trouble identifying any. Asked what her  would think she was good at, she identified- organizing. She was unable to identify any others.  Provided suggestion of considering what a friend would say. She identified- getting along with youth, coming up with interesting things to with youth.  Interesting things- singing, art, culture     OP EDUCATION:  internal and external memory strategies     Home program: practice memory strategies, begin to journal using prompts sent in email    Goals:  Active       OT Problem       OT-Patient will manage fatigue independently through use of fatigue traffic light and other fatigue strategies to allow participation in ADL/IADL/leisure with fatigue rating of 4/10(yellow) or less.        Start:  03/13/24    Expected End:  06/14/24            Patient will complete complex IADL independently using cognitive and fatigue management strategies        Start:  03/13/24    Expected End:  06/14/24            OT- Patient will use memory and planning system independently as a compensatory tool for management of day, memory and planning.        Start:  03/13/24    Expected End:  06/14/24            Patient will use memory strategies independently to allow improved ability to manage appointments, remember errands,and to improve working memory.       Start:  03/13/24    Expected End:  06/14/24            OT-Patient will demonstrate good recall and  understanding of written material through use of PQRST and other reading strategies.        Start:  03/13/24    Expected End:  06/14/24            Patient will complete self care with use of compensatory tools/techniques and energy conservation techniques to allow decreased fatigue..       Start:  03/13/24    Expected End:  06/14/24            Patient will demonstrate improved writing demonstrating 25% improvement in legibility and ability to mentally process writing.       Start:  03/13/24    Expected End:  06/14/24            Patient will demonstrated improved functional communication through use of compensatory tools/techniques and improved executive function.       Start:  03/13/24    Expected End:  06/14/24            Patient will participate in Leisure tasks independently including walking through use of fatigue management, energy conservation and use of compensatory tools/techniques.       Start:  03/13/24    Expected End:  06/14/24            Patient with demonstrate 4+/5 bilateral UE strength and independent HEP completion.       Start:  03/13/24

## 2024-04-19 ENCOUNTER — TELEMEDICINE (OUTPATIENT)
Dept: BEHAVIORAL HEALTH | Facility: CLINIC | Age: 69
End: 2024-04-19
Payer: MEDICARE

## 2024-04-19 DIAGNOSIS — F41.1 GAD (GENERALIZED ANXIETY DISORDER): ICD-10-CM

## 2024-04-19 PROCEDURE — 3049F LDL-C 100-129 MG/DL: CPT | Performed by: CLINICAL NURSE SPECIALIST

## 2024-04-19 PROCEDURE — 1159F MED LIST DOCD IN RCRD: CPT | Performed by: CLINICAL NURSE SPECIALIST

## 2024-04-19 PROCEDURE — 1160F RVW MEDS BY RX/DR IN RCRD: CPT | Performed by: CLINICAL NURSE SPECIALIST

## 2024-04-19 PROCEDURE — 4010F ACE/ARB THERAPY RXD/TAKEN: CPT | Performed by: CLINICAL NURSE SPECIALIST

## 2024-04-19 PROCEDURE — 99212 OFFICE O/P EST SF 10 MIN: CPT | Performed by: CLINICAL NURSE SPECIALIST

## 2024-04-19 RX ORDER — FLUOXETINE HYDROCHLORIDE 40 MG/1
40 CAPSULE ORAL
Qty: 90 CAPSULE | Refills: 0 | Status: SHIPPED | OUTPATIENT
Start: 2024-04-19 | End: 2024-07-18

## 2024-04-19 NOTE — PROGRESS NOTES
Outpatient Psychiatry      Subjective   Юлия Ku, a 69 y.o. female, presents for a medication management /outpatient psychiatry virtual appointment as an established patient     Diagnosis:   Generalized anxiety disorder     Treatment Plan/Recommendations: can follow up in 3 months  , can continue self care and wellness efforts and maintain appointments with other medical providers , can call  for treatment and scheduling concerns   Follow-up plan was discussed with patient.    Review with patient: Treatment plan reviewed with the patient.  Medication risks/benefit reviewed with the patient    HPI:Patient reports she had covid, was diagnosed with long haul covid , and since then has had more medical and mental health concerns , treated on an ongoing basis per her other medical providers , notes in the [facility] emr reviewed , she has co existing autoimmune conditions   She reports it is hard to deal with brain fog and a freezing gait , dry mouth and fatigue , no safety issues with memory , no recent serious issues from falls , says she sometimes falls when turning , she walks with a cane which helps , and she takes sinemet which helps some also per her report   She keeps up with her appointments   + fatigue  + brain fog   Low motivation   No thoughts of self harm , no thoughts of harming others   No psychoses   Insight is good   She has support in her life   Can get anxious , and tense at times   Sometimes wakes up and can't go back to sleep   She is knowledgeable about her medical conditions   She would like to continue the same medication , fluoxetine 40 mg , daily   No med side effects     Current Outpatient Medications:     carbidopa-levodopa (Parcopa)  mg disintegrating tablet, Take 0.5 tablets by mouth 3 times a day., Disp: , Rfl:     cholecalciferol, vitD3,/vit K2 (vitamin D3-vitamin K2) 250 mcg (10,000 unit)-45 mcg capsule, Take by mouth., Disp: , Rfl:     cycloSPORINE (Restasis) 0.05 %  ophthalmic emulsion, Administer 1 drop into both eyes every 12 hours., Disp: , Rfl:     FLUoxetine (PROzac) 40 mg capsule, Take 1 capsule (40 mg) by mouth once daily., Disp: 90 capsule, Rfl: 0    FreeStyle Srinivas 3 Sensor device, USE AS DIRECTED TO CHECK BLOOD GLUCOSE. CHANGE EVERY 14 DAYS, Disp: , Rfl:     insulin degludec (TRESIBA U-100 INSULIN SUBQ), Inject 10 Units under the skin every 7 days. Take as directed per insulin instructions., Disp: , Rfl:     levothyroxine (Synthroid, Levoxyl) 112 mcg tablet, Take 1 tablet (112 mcg) by mouth once daily., Disp: 90 tablet, Rfl: 3    losartan (Cozaar) 50 mg tablet, TAKE 1 TABLET(50 MG) BY MOUTH EVERY DAY, Disp: 90 tablet, Rfl: 0    metFORMIN XR (Glucophage-XR) 500 mg 24 hr tablet, Take 1 tablet (500 mg) by mouth once daily in the evening. Take with meals., Disp: , Rfl:     MINERALS ORAL, Take 1 tablet by mouth once daily., Disp: , Rfl:     NON FORMULARY, Glia Plasmalogens, Disp: , Rfl:     NON FORMULARY, Take 2 each by mouth once daily. Ferrasorb, Disp: , Rfl:     omega-3 fatty acids (SUPER OMEGA-3 ORAL), Take by mouth., Disp: , Rfl:     pilocarpine (Salagen) 5 mg tablet, TAKE 1 TABLET(5 MG) BY MOUTH THREE TIMES DAILY, Disp: 270 tablet, Rfl: 1    PreviDent 5000 Dry Mouth 1.1 % dental paste, Apply to teeth once daily., Disp: , Rfl:     rosuvastatin (Crestor) 10 mg tablet, Take 1 tablet (10 mg) by mouth once daily at bedtime., Disp: 90 tablet, Rfl: 1    Record Review: brief    Vitals:  There were no vitals filed for this visit.    Marva Santos, APRN-CNS

## 2024-05-07 ENCOUNTER — LAB (OUTPATIENT)
Dept: LAB | Facility: LAB | Age: 69
End: 2024-05-07
Payer: MEDICARE

## 2024-05-07 ENCOUNTER — OFFICE VISIT (OUTPATIENT)
Dept: PRIMARY CARE | Facility: CLINIC | Age: 69
End: 2024-05-07
Payer: MEDICARE

## 2024-05-07 VITALS
HEART RATE: 89 BPM | TEMPERATURE: 97.2 F | SYSTOLIC BLOOD PRESSURE: 124 MMHG | OXYGEN SATURATION: 95 % | DIASTOLIC BLOOD PRESSURE: 72 MMHG | WEIGHT: 159 LBS | BODY MASS INDEX: 29.08 KG/M2

## 2024-05-07 DIAGNOSIS — E11.9 TYPE 2 DIABETES MELLITUS WITHOUT COMPLICATION, WITH LONG-TERM CURRENT USE OF INSULIN (MULTI): Chronic | ICD-10-CM

## 2024-05-07 DIAGNOSIS — Z00.00 ROUTINE GENERAL MEDICAL EXAMINATION AT HEALTH CARE FACILITY: Primary | ICD-10-CM

## 2024-05-07 DIAGNOSIS — Z79.4 TYPE 2 DIABETES MELLITUS WITHOUT COMPLICATION, WITH LONG-TERM CURRENT USE OF INSULIN (MULTI): Chronic | ICD-10-CM

## 2024-05-07 DIAGNOSIS — E78.5 HYPERLIPIDEMIA, UNSPECIFIED HYPERLIPIDEMIA TYPE: Chronic | ICD-10-CM

## 2024-05-07 DIAGNOSIS — I10 BENIGN ESSENTIAL HYPERTENSION: Chronic | ICD-10-CM

## 2024-05-07 DIAGNOSIS — I15.2 HYPERTENSION ASSOCIATED WITH DIABETES (MULTI): ICD-10-CM

## 2024-05-07 DIAGNOSIS — E11.59 HYPERTENSION ASSOCIATED WITH DIABETES (MULTI): ICD-10-CM

## 2024-05-07 DIAGNOSIS — R41.89 BRAIN FOG: ICD-10-CM

## 2024-05-07 DIAGNOSIS — K11.23 CHRONIC SCLEROSING SIALADENITIS: ICD-10-CM

## 2024-05-07 DIAGNOSIS — U09.9 LONG COVID: ICD-10-CM

## 2024-05-07 DIAGNOSIS — G63 POLYNEUROPATHY ASSOCIATED WITH UNDERLYING DISEASE (MULTI): ICD-10-CM

## 2024-05-07 DIAGNOSIS — Z78.9 FULL CODE STATUS: ICD-10-CM

## 2024-05-07 PROBLEM — M54.12 CERVICAL MYELOPATHY WITH CERVICAL RADICULOPATHY (MULTI): Status: ACTIVE | Noted: 2024-05-07

## 2024-05-07 PROBLEM — G95.9 CERVICAL MYELOPATHY WITH CERVICAL RADICULOPATHY (MULTI): Status: ACTIVE | Noted: 2024-05-07

## 2024-05-07 PROBLEM — R60.9 EDEMA: Status: RESOLVED | Noted: 2024-01-31 | Resolved: 2024-05-07

## 2024-05-07 PROBLEM — R13.10 DYSPHAGIA: Status: RESOLVED | Noted: 2024-01-31 | Resolved: 2024-05-07

## 2024-05-07 PROBLEM — M54.50 LOW BACK PAIN, UNSPECIFIED: Status: RESOLVED | Noted: 2023-03-23 | Resolved: 2024-05-07

## 2024-05-07 PROBLEM — B34.9 NONSPECIFIC SYNDROME SUGGESTIVE OF VIRAL ILLNESS: Status: RESOLVED | Noted: 2024-01-31 | Resolved: 2024-05-07

## 2024-05-07 PROBLEM — Z01.419 WOMEN'S ANNUAL ROUTINE GYNECOLOGICAL EXAMINATION: Status: RESOLVED | Noted: 2024-02-06 | Resolved: 2024-05-07

## 2024-05-07 LAB — LEAD BLD-MCNC: 0.5 UG/DL

## 2024-05-07 PROCEDURE — 1158F ADVNC CARE PLAN TLK DOCD: CPT | Performed by: FAMILY MEDICINE

## 2024-05-07 PROCEDURE — G0439 PPPS, SUBSEQ VISIT: HCPCS | Performed by: FAMILY MEDICINE

## 2024-05-07 PROCEDURE — 83825 ASSAY OF MERCURY: CPT

## 2024-05-07 PROCEDURE — 3074F SYST BP LT 130 MM HG: CPT | Performed by: FAMILY MEDICINE

## 2024-05-07 PROCEDURE — 4010F ACE/ARB THERAPY RXD/TAKEN: CPT | Performed by: FAMILY MEDICINE

## 2024-05-07 PROCEDURE — 83655 ASSAY OF LEAD: CPT

## 2024-05-07 PROCEDURE — 1159F MED LIST DOCD IN RCRD: CPT | Performed by: FAMILY MEDICINE

## 2024-05-07 PROCEDURE — 1170F FXNL STATUS ASSESSED: CPT | Performed by: FAMILY MEDICINE

## 2024-05-07 PROCEDURE — 36415 COLL VENOUS BLD VENIPUNCTURE: CPT

## 2024-05-07 PROCEDURE — 1123F ACP DISCUSS/DSCN MKR DOCD: CPT | Performed by: FAMILY MEDICINE

## 2024-05-07 PROCEDURE — 3049F LDL-C 100-129 MG/DL: CPT | Performed by: FAMILY MEDICINE

## 2024-05-07 PROCEDURE — 1160F RVW MEDS BY RX/DR IN RCRD: CPT | Performed by: FAMILY MEDICINE

## 2024-05-07 PROCEDURE — 99214 OFFICE O/P EST MOD 30 MIN: CPT | Performed by: FAMILY MEDICINE

## 2024-05-07 PROCEDURE — 3078F DIAST BP <80 MM HG: CPT | Performed by: FAMILY MEDICINE

## 2024-05-07 RX ORDER — ROSUVASTATIN CALCIUM 10 MG/1
10 TABLET, COATED ORAL NIGHTLY
Qty: 90 TABLET | Refills: 1 | Status: SHIPPED | OUTPATIENT
Start: 2024-05-07

## 2024-05-07 RX ORDER — EPINEPHRINE 0.3 MG/.3ML
1 INJECTION SUBCUTANEOUS ONCE AS NEEDED
COMMUNITY
Start: 2024-04-12

## 2024-05-07 RX ORDER — CARBIDOPA AND LEVODOPA 25; 100 MG/1; MG/1
0.5 TABLET ORAL
COMMUNITY
Start: 2024-04-30

## 2024-05-07 RX ORDER — LOSARTAN POTASSIUM 50 MG/1
50 TABLET ORAL DAILY
Qty: 90 TABLET | Refills: 1 | Status: SHIPPED | OUTPATIENT
Start: 2024-05-07

## 2024-05-07 ASSESSMENT — PATIENT HEALTH QUESTIONNAIRE - PHQ9
1. LITTLE INTEREST OR PLEASURE IN DOING THINGS: NOT AT ALL
2. FEELING DOWN, DEPRESSED OR HOPELESS: NOT AT ALL
SUM OF ALL RESPONSES TO PHQ9 QUESTIONS 1 AND 2: 0

## 2024-05-07 ASSESSMENT — ACTIVITIES OF DAILY LIVING (ADL)
BATHING: INDEPENDENT
TAKING_MEDICATION: INDEPENDENT
DRESSING: INDEPENDENT
GROCERY_SHOPPING: NEEDS ASSISTANCE
MANAGING_FINANCES: NEEDS ASSISTANCE
DOING_HOUSEWORK: INDEPENDENT

## 2024-05-07 NOTE — PROGRESS NOTES
Subjective   Reason for Visit: Юлия Ku is an 69 y.o. female here for a Medicare Wellness visit.     Past Medical, Surgical, and Family History reviewed and updated in chart.         Юлия has been feeling fatigued. Brain fog has been worse than usual. Has difficulty with word finding and memory. Her thyroid medication was recently increased by her endocrinologist. She is scheduled for follow-up in the long-COVID clinic. She is also trying to get into a long-COVID study in [place] and is requesting a letter verifying her long-COVID diagnosis.     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             Patient Care Team:  Bibiana Dupree DO as PCP - General  Bibiana Dupree DO as [facility]  Rony Ingram MD as Referring Physician (Endocrinology)  Tiff Obrien MD as Referring Physician (Rheumatology)  Noel Carrizales as Consulting Physician (Neurology)  Pramod Lance DO as Referring Physician (Orthopaedic Surgery)  AMANDEEP Ramos as Nurse Practitioner (Internal Medicine)     Review of Systems   Constitutional:  Positive for fatigue. Negative for chills and fever.   Respiratory:  Negative for cough and shortness of breath.    Cardiovascular:  Negative for chest pain.   Gastrointestinal:  Negative for abdominal pain, diarrhea, nausea and vomiting.   Genitourinary:  Negative for dysuria.   Neurological:  Negative for dizziness, syncope and headaches.       Objective   Vitals:  /72   Pulse 89   Temp 36.2 °C (97.2 °F)   Wt 72.1 kg (159 lb)   SpO2 95%   BMI 29.08 kg/m²       Physical Exam  Constitutional:       General: She is not in acute distress.     Appearance: Normal appearance.   HENT:      Head: Normocephalic.      Mouth/Throat:      Mouth: Mucous membranes are moist.   Eyes:      Extraocular Movements: Extraocular movements intact.      Conjunctiva/sclera: Conjunctivae normal.   Cardiovascular:       Rate and Rhythm: Normal rate and regular rhythm.      Heart sounds: No murmur heard.  Pulmonary:      Breath sounds: No wheezing or rhonchi.   Musculoskeletal:      Cervical back: Neck supple.   Skin:     General: Skin is warm and dry.   Neurological:      Mental Status: She is alert.   Psychiatric:         Attention and Perception: Attention normal.         Mood and Affect: Mood normal.         Speech: Speech normal.         Behavior: Behavior normal.         Cognition and Memory: Cognition normal.         Assessment/Plan   Problem List Items Addressed This Visit       Type 2 diabetes mellitus (Multi) (Chronic)    Overview     Mgmt per endocrinology.          Relevant Orders    CBC and Auto Differential    Comprehensive Metabolic Panel    Benign essential hypertension (Chronic)    Relevant Medications    losartan (Cozaar) 50 mg tablet    Hyperlipidemia (Chronic)    Relevant Medications    rosuvastatin (Crestor) 10 mg tablet    Other Relevant Orders    Lipid Panel    Comprehensive Metabolic Panel    Brain fog    Overview     MOCA normal 26/30 on 9/11/23         Relevant Orders    Lead, blood (Completed)    Heavy Metals, Blood    Polyneuropathy associated with underlying disease (Multi)    Hypertension associated with diabetes (Multi)    Chronic sclerosing sialadenitis    Overview     Biopsy confirmed in 2024.          Current Assessment & Plan     Biopsy confirmed in 2024.           Other Visit Diagnoses       Routine general medical examination at health care facility    -  Primary    Recommend Shingrix and RSV vaccines.    Long COVID        Continue mgmt per  long-COVID clinic.    Full code status        Discussed code status. Patient elects to be full code.    Relevant Orders    Full code (Completed)             Patient was identified as a fall risk. Risk prevention instructions provided.

## 2024-05-07 NOTE — PATIENT INSTRUCTIONS
Go to [facility] for the RSV vaccine.         Ways to Help Prevent Falls at Home    Quick Tips   ? Ask for help if you need it. Most people want to help!   ? Get up slowly after sitting or laying down   ? Wear a medical alert device or keep cell phone in your pocket   ? Use night lights, especially areas near a bathroom   ? Keep the items you use often within reach on a small stool or end table   ? Use an assistive device such as walker or cane, as directed by provider/physical therapy   ? Use a non-slip mat and grab bars in your bathroom. Look for home health sections for best options     Other Areas to Focus On   ? Exercise and nutrition: Regular exercise or taking a falls prevention class are great ways improve strength and balance. Don’t forget to stay hydrated and bring a snack!   ? Medicine side effects: Some medicines can make you sleepy or dizzy, which could cause a fall. Ask your healthcare provider about the side effects your medicines could cause. Be sure to let them know if you take any vitamins or supplements as well.   ? Tripping hazards: Remove items you could trip on, such as loose mats, rugs, cords, and clutter. Wear closed toe shoes with rubber soles.   ? Health and wellness: Get regular checkups with your healthcare provider, plus routine vision and hearing screenings. Talk with your healthcare provider about:   o Your medicines and the possible side effects - bring them in a bag if that is easier!   o Problems with balance or feeling dizzy   o Ways to promote bone health, such as Vitamin D and calcium supplements   o Questions or concerns about falling     *Ask your healthcare team if you have questions     [facility], 2022

## 2024-05-07 NOTE — LETTER
May 7, 2024     Patient: Юлия Ku   YOB: 1955   Date of Visit: 5/7/2024       To Whom It May Concern:    Юлия Ku was seen in my clinic on 5/7/2024 at 12:50 pm. She has been diagnosed with long-COVID and experiences brain fog and gait abnormalities as a result of this problem.            Sincerely,         Bibiana Dupree,         CC: No Recipients

## 2024-05-08 NOTE — PROGRESS NOTES
[facility] In-Person     Subjective   COVID-19 Infection Date:  01/2021  PCR confirmed (sx: Fatigue, Loss or disturbed taste, Brain fog Fatigue, Loss or disturbed taste, Brain fog  - no hospitalization, no treatment)    COVID-19 vaccine status: Pfizer 03/30/21, 04/20/21, 10/29/21, 09/16/22, 09/20/23    Occupation:     Current Providers: PCP Dr. Dupree, Endocrinology Dr. Rosales, Neurology Movement Dr. Ibarra, Neurology MS Dr. Zavala,  Rheumatology Dr. Obrien and Dr. Abdul, Cardiology Dr. Funez, ENT Dr. Tan, Immunology Dr. Rajan, OT [facility], Psychiatry [facility]    Survey Scores: 10/2023 -> 04/2024  PHQ-9: 5 -> 4   MARGO-7: 0 -> 5   Sleep Wellness: 11 snores -> 11   FSS average: 4.56 -> 5.44   Modified Ecog average: 1.58 -> 2.17   MOCA: 17/22 (10/2023)  Overall Health: 45 -> 50     69 y.o. female with a h/o COVID-19 in January 2021, DM2, HTN, HLD, Grave's disease s/p ablation now on thyroid replacement, CAD, [facility] with c/o dry mouth, freezing gait, fatigue, brain fog.    Юлия has not been doing well, brain fog and fatigue are bad during waking hours  At night, she feels good, for example when she gets up to use the bathroom at night  Also feels good when she naps during the day, brain fog and fatigue return when she gets up from nap  Feels dizzy all the time during the day, no matter what she is doing, when she wakes up at night does not feel dizzy  Wondering if it is a side effect of sinemet  She was previously changed to every 4 hours on Sinement for freezing gait, now back to every 6 hours, symptoms recur when she gets close to her next dose, feels that this is sufficient though  Freezing gait continues, has been falling  Stopped Physical Therapy, that was not helpful  Will cancel OT session, this was not helpful either  She tried to see Dr. Lei twice but wait was over an hour  Pilocarpine is expensive and causing too much saliva  Has now  completed 4 SQIG treatments, will be getting 4 more and follow up with immunology  Feels that none of the recommended approaches so far have been successful, looking into seeing immunologist in [place] or undergoing testing for mold   Prozac has been helping, not interested in attending [facility] group therapy    Relevant prior healthcare visits:  -04/2024 Psychiatry for MARGO, continuing Prozac  -04/2024 OT notes patient is receptive to instruction on compensatory memory techniques, lack of confidence and fatigue and brain fog are contributing to impaired participation in meaningful activity  -04/2024 Endocrinology for postablative hypothyroidism, continue medication and lab test prior to next visit  -04/2024 PT [facility] notes difficulty with turning to R and no FOG with ambulation through transitions with thesholds this date, review of strategies for balance with turns  -02/2024 Allergy/Immunology for CVID, plan to start on Ig replacement therapy, continue pilocarpine for salivary prod  -01/2024 Neurology [facility] for primary freezing of gait, sinemet has been helpful and plans to increase dose  -01/2024 Rheumatology [facility] notes improvement on dry mouth on pilocarpine, notes that joint pain sounds inflammatory, recommends trial of antidepressant for fatigue and brain fog along with anhedonia  -01/2024 ENT referred to dental medicine for dry mouth  -11/2023 Cardiology for RV dysfunction without pulmonary HTN, ischemic evaluation and RHC was negative, plans to repeat echocardiogram. Mild orthostatic hypotension in the office but symptoms did not improve with holding losartan or with midodrine  -09/2023 PCP referred to neuropsychology for worsening brain fog, consult to Rheumatology for gait changes  -09/2023 SLP [facility] for cognitive deficits and communication deficits    Relevant prior diagnostic studies:  -03/2024 CT head unremarkable   -12/2023 GI esophagram shows brief delay in barium tablet transit at the level of the aortic  arch  -11/2023 HSAT normal  -09/2023 echocardiogram   -03/2023 MRI brain unremarkable, MRI C-spine with cervical spondylosis    Relevant prior laboratory values, unremarkable unless noted:  -05/2024 lead  -04/2024 TSH elevated, CBC/D, CMP, lipids elevated   -01/2024 TSH, HbA1c 6.2%, MARGO ab, SPEP, strep pneumo ab, tetanus ab  -10/2023 pm cortisol and am cortisol slightly elevatd, IgG and IgA low, Vitamin B6  -09/2023 Ferritin 19, iron studies low, CBC/D, Copper, Vitamin E, Folate, TSH  -08/2023 Tetanus Ab, Diphteria Ab, Strep Pneumo Ab, Lyme Ab  -07/2023 Esr, Vitamin A, MONALIAS, RF, Thyroxine  -06/2023 Tryptase, Allergy respiratory panel abnormal, thyroid perox, thyroglobulin, Ttransglutaminase, IGA low, HbA1c 6.2%, IGG low, beta 2 glycoprotein, cardiolipin ab  -06/2023 cryoglobulin  -04/2023 Heptatis C ab, MONALISA, AChR bidning Ab, Vitamin D, Vitamin B12, Anti-SSB and SSA    Exercise routine:  Diet:  Weight hx: pre-COVID-19 201 lbs -> post-COVID-19 157 lbs -> 158   Substance use: no tobacco use hx  Social:       Current Outpatient Medications:     carbidopa-levodopa (Sinemet)  mg tablet, Take 0.5 tablets by mouth., Disp: , Rfl:     cholecalciferol, vitD3,/vit K2 (vitamin D3-vitamin K2) 250 mcg (10,000 unit)-45 mcg capsule, Take by mouth., Disp: , Rfl:     cycloSPORINE (Restasis) 0.05 % ophthalmic emulsion, Administer 1 drop into both eyes every 12 hours., Disp: , Rfl:     EPINEPHrine 0.3 mg/0.3 mL injection syringe, Inject 0.3 mL (0.3 mg) into the muscle 1 time if needed., Disp: , Rfl:     FLUoxetine (PROzac) 40 mg capsule, Take 1 capsule (40 mg) by mouth once daily., Disp: 90 capsule, Rfl: 0    FreeStyle Srinivas 3 Sensor device, USE AS DIRECTED TO CHECK BLOOD GLUCOSE. CHANGE EVERY 14 DAYS, Disp: , Rfl:     insulin degludec (TRESIBA U-100 INSULIN SUBQ), Inject 10 Units under the skin every 7 days. Take as directed per insulin instructions., Disp: , Rfl:     levothyroxine (Synthroid, Levoxyl) 112 mcg tablet, Take  1 tablet (112 mcg) by mouth once daily., Disp: 90 tablet, Rfl: 3    losartan (Cozaar) 50 mg tablet, Take 1 tablet (50 mg) by mouth once daily., Disp: 90 tablet, Rfl: 1    metFORMIN XR (Glucophage-XR) 500 mg 24 hr tablet, Take 1 tablet (500 mg) by mouth once daily in the evening. Take with meals., Disp: , Rfl:     MINERALS ORAL, Take 1 tablet by mouth once daily., Disp: , Rfl:     NON FORMULARY, Glia Plasmalogens, Disp: , Rfl:     NON FORMULARY, Take 2 each by mouth once daily. Ferrasorb, Disp: , Rfl:     omega-3 fatty acids (SUPER OMEGA-3 ORAL), Take by mouth., Disp: , Rfl:     pilocarpine (Salagen) 5 mg tablet, TAKE 1 TABLET(5 MG) BY MOUTH THREE TIMES DAILY, Disp: 270 tablet, Rfl: 1    PreviDent 5000 Dry Mouth 1.1 % dental paste, Apply to teeth once daily., Disp: , Rfl:     rosuvastatin (Crestor) 10 mg tablet, Take 1 tablet (10 mg) by mouth once daily at bedtime., Disp: 90 tablet, Rfl: 1    Past Medical History:   Diagnosis Date    Disease of thyroid gland graves 2008    Fatigue 12/2023    Personal history of irradiation     Status post radioactive iodine thyroid ablation    Personal history of other diseases of the circulatory system     History of hypertension    Personal history of other endocrine, nutritional and metabolic disease     History of type 2 diabetes mellitus    Personal history of other endocrine, nutritional and metabolic disease     History of Graves' disease    Personal history of other endocrine, nutritional and metabolic disease     History of hyperlipidemia    Personal history of other endocrine, nutritional and metabolic disease     History of hypothyroidism       Past Surgical History:   Procedure Laterality Date    BREAST BIOPSY  5/2008    OTHER SURGICAL HISTORY  01/12/2021    No history of surgery       Family History   Problem Relation Name Age of Onset    Alzheimer's disease Mother Sandeep Lambert     Hypertension Mother Sandeep Lambert     Cancer Mother Sandeep Youngs     Breast cancer Mother Snadeep  "Ardys     Heart disease Maternal Grandfather      Hypertension Father Chichi Lambert     Kidney disease Father Chichi Lambert        Objective   /81 (BP Location: Right arm, Patient Position: Sitting, BP Cuff Size: Adult)   Pulse 94   Temp 36.5 °C (97.7 °F) (Temporal)   Ht 1.6 m (5' 3\")   Wt 75.8 kg (167 lb)   SpO2 97%   BMI 29.58 kg/m²     Physical Exam  Vitals reviewed.   Constitutional:       Appearance: Normal appearance.   HENT:      Mouth/Throat:      Mouth: Mucous membranes are moist.      Comments: Chewing gum  Eyes:      Conjunctiva/sclera: Conjunctivae normal.   Cardiovascular:      Rate and Rhythm: Normal rate and regular rhythm.      Heart sounds: Normal heart sounds.   Pulmonary:      Effort: Pulmonary effort is normal.      Breath sounds: Normal breath sounds.   Abdominal:      General: Bowel sounds are normal.      Palpations: Abdomen is soft.   Musculoskeletal:         General: Normal range of motion.      Cervical back: Neck supple.   Skin:     General: Skin is warm and dry.   Neurological:      General: No focal deficit present.      Gait: Gait abnormal.   Psychiatric:         Mood and Affect: Mood normal.         Cognition and Memory: Cognition normal.         Assessment/Plan   Problem List Items Addressed This Visit             ICD-10-CM       High    Post-acute sequelae of COVID-19 (PASC) - Primary U09.9     05/2024:  dry mouth, freezing gait, fatigue, brain fog  -continue close follow up with Movement neurology for primary freezing of gait  -continue close follow up with immunology for immune deficiency  -consider seeing another dentist or schedule an early day appointment with Dr. Lei for dry mouth, continue pilocarpine as needed  -I am sorry to hear that physical therapy and occupational therapy have not been helpful  -I will discuss your current symptoms and work-up, as well as treatment approaches that we have tried, with Dr. Al for his guidance   -follow up as " "needed    02/2024:  dry mouth, freezing gait, fatigue, brain fog  -continue close follow-up with your specialists and their recommendations  -continue with PT and restart OT, I will let Sommer know about the December visit  -consider seeing [place] [facility] Psychologist Sherrill Fall    11/2023: dry mouth, freezing gait, fatigue, brain fog  -continue supplements that have been helpful with dry mouth  -follow-up with neurology and recommended treatments, glad that moving up medication timing has helped control freezing gait symptoms  -I will reach out to Sommer Guardado to get you scheduled with Occupational Therapy  -referral to Endocrinologist Dr. Castro per your request  -follow-up with Sherrill Fall as scheduled    10/2023: Dry mouth, freezing gait, fatigue, brain fog, poor sleep  -Home Sleep Study to rule out sleep apnea as a cause/contribution to your symptoms   -referral to Long COVID Psychology  -referral to Occupational therapist Sommer Guardado for fatigue and brain fog  -additional bloodwork: repeat immunoglobulin levels, Vitamin B6, morning cortisol level (ideally before 9am) and evening cortisol level after 4pm  -shorty supplement recommendation: Ferrous Gluconate is typically well tolerated, you can take 27-38 mg elemental iron every other day. Taking this along with orange juice or a Vitamin C supplement increases absorption.   -I will discuss dry mouth and freezing gait with other providers for further guidance  -additional recommendations provided under \"Patient Education\" section   -> immunology for low IGG and IGA, continue follow-up with Dr. Mata and neuroPT, lip biopsy with ENT            "

## 2024-05-09 ASSESSMENT — ENCOUNTER SYMPTOMS
DYSURIA: 0
NAUSEA: 0
FATIGUE: 1
SHORTNESS OF BREATH: 0
VOMITING: 0
DIZZINESS: 0
HEADACHES: 0
CHILLS: 0
ABDOMINAL PAIN: 0
DIARRHEA: 0
FEVER: 0
COUGH: 0

## 2024-05-10 LAB
ARSENIC BLD-MCNC: <10 UG/L
LEAD BLDV-MCNC: <2 UG/DL
MERCURY BLD-MCNC: <2.5 UG/L

## 2024-05-13 ENCOUNTER — OFFICE VISIT (OUTPATIENT)
Dept: OTHER | Facility: CLINIC | Age: 69
End: 2024-05-13
Payer: MEDICARE

## 2024-05-13 VITALS
HEART RATE: 94 BPM | TEMPERATURE: 97.7 F | SYSTOLIC BLOOD PRESSURE: 126 MMHG | DIASTOLIC BLOOD PRESSURE: 81 MMHG | BODY MASS INDEX: 29.59 KG/M2 | HEIGHT: 63 IN | WEIGHT: 167 LBS | OXYGEN SATURATION: 97 %

## 2024-05-13 DIAGNOSIS — U09.9 POST-ACUTE SEQUELAE OF COVID-19 (PASC): Primary | ICD-10-CM

## 2024-05-13 PROCEDURE — 99215 OFFICE O/P EST HI 40 MIN: CPT | Performed by: NURSE PRACTITIONER

## 2024-05-13 ASSESSMENT — PAIN SCALES - GENERAL: PAINLEVEL: 0-NO PAIN

## 2024-05-13 NOTE — PATIENT INSTRUCTIONS
It was my pleasure seeing you in the [facility] today.  We will focus on addressing the following symptoms discussed today: dry mouth, freezing gait, fatigue, brain fog    My recommendations are as follows:  -continue close follow up with Movement neurology for primary freezing of gait  -continue close follow up with immunology for immune deficiency  -consider seeing another dentist or schedule an early day appointment with Dr. Lei for dry mouth, continue pilocarpine as needed  -I am sorry to hear that physical therapy and occupational therapy have not been helpful  -I will discuss your current symptoms and work-up, as well as treatment approaches that we have tried, with Dr. Al for his guidance   -follow up as needed, call [phone] or send a message through your [facility] maty

## 2024-05-13 NOTE — ASSESSMENT & PLAN NOTE
05/2024:  dry mouth, freezing gait, fatigue, brain fog  -continue close follow up with Movement neurology for primary freezing of gait  -continue close follow up with immunology for immune deficiency  -consider seeing another dentist or schedule an early day appointment with Dr. Lei for dry mouth, continue pilocarpine as needed  -I am sorry to hear that physical therapy and occupational therapy have not been helpful  -I will discuss your current symptoms and work-up, as well as treatment approaches that we have tried, with Dr. Al for his guidance   -follow up as needed    02/2024:  dry mouth, freezing gait, fatigue, brain fog  -continue close follow-up with your specialists and their recommendations  -continue with PT and restart OT, I will let Sommer know about the December visit  -consider seeing [place] [facility] Psychologist Sherrill Fall    11/2023: dry mouth, freezing gait, fatigue, brain fog  -continue supplements that have been helpful with dry mouth  -follow-up with neurology and recommended treatments, glad that moving up medication timing has helped control freezing gait symptoms  -I will reach out to Sommer Guardado to get you scheduled with Occupational Therapy  -referral to Endocrinologist Dr. Castro per your request  -follow-up with Sherrill Fall as scheduled    10/2023: Dry mouth, freezing gait, fatigue, brain fog, poor sleep  -Home Sleep Study to rule out sleep apnea as a cause/contribution to your symptoms   -referral to Long COVID Psychology  -referral to Occupational therapist Sommer Guardado for fatigue and brain fog  -additional bloodwork: repeat immunoglobulin levels, Vitamin B6, morning cortisol level (ideally before 9am) and evening cortisol level after 4pm  -shorty supplement recommendation: Ferrous Gluconate is typically well tolerated, you can take 27-38 mg elemental iron every other day. Taking this along with orange juice or a Vitamin C supplement increases absorption.   -I will discuss dry mouth and  "freezing gait with other providers for further guidance  -additional recommendations provided under \"Patient Education\" section   -> immunology for low IGG and IGA, continue follow-up with Dr. Mata and neuroPT, lip biopsy with ENT    "

## 2024-05-17 ENCOUNTER — PATIENT MESSAGE (OUTPATIENT)
Dept: PRIMARY CARE | Facility: CLINIC | Age: 69
End: 2024-05-17
Payer: MEDICARE

## 2024-05-17 ENCOUNTER — APPOINTMENT (OUTPATIENT)
Dept: OCCUPATIONAL THERAPY | Facility: HOSPITAL | Age: 69
End: 2024-05-17
Payer: MEDICARE

## 2024-05-22 ENCOUNTER — DOCUMENTATION (OUTPATIENT)
Dept: OCCUPATIONAL THERAPY | Facility: HOSPITAL | Age: 69
End: 2024-05-22
Payer: MEDICARE

## 2024-05-22 NOTE — PROGRESS NOTES
Occupational Therapy    Discharge Summary    Name: Юлия Ku  MRN: [id number]  : 1955  Date: 24    Discharge Summary: OT    Discharge Information: Date of discharge 24, Date of last visit 24, Date of evaluation 23, Number of attended visits 5, Referred by Anila Abad, and Referred for cognitive deficits and fatigue related to PASC    Therapy Summary: Tx consisted of cognitive skills retraining, instruction on compensatory tools/techniques, fatigue management/energy conservation, IADL retraining    Discharge Status: goals partially met     Rehab Discharge Reason: Failed to schedule and/or keep follow-up appointment(s)

## 2024-06-17 ENCOUNTER — APPOINTMENT (OUTPATIENT)
Dept: ALLERGY | Facility: CLINIC | Age: 69
End: 2024-06-17
Payer: MEDICARE

## 2024-06-17 ENCOUNTER — LAB (OUTPATIENT)
Dept: LAB | Facility: LAB | Age: 69
End: 2024-06-17
Payer: MEDICARE

## 2024-06-17 VITALS
OXYGEN SATURATION: 98 % | HEIGHT: 63 IN | HEART RATE: 71 BPM | BODY MASS INDEX: 29.23 KG/M2 | RESPIRATION RATE: 17 BRPM | SYSTOLIC BLOOD PRESSURE: 134 MMHG | DIASTOLIC BLOOD PRESSURE: 70 MMHG | TEMPERATURE: 97.6 F | WEIGHT: 165 LBS

## 2024-06-17 DIAGNOSIS — D83.9 CVID (COMMON VARIABLE IMMUNODEFICIENCY) (MULTI): Primary | ICD-10-CM

## 2024-06-17 DIAGNOSIS — E13.9 DIABETES 1.5, MANAGED AS TYPE 1 (MULTI): ICD-10-CM

## 2024-06-17 DIAGNOSIS — E03.8 OTHER SPECIFIED HYPOTHYROIDISM: ICD-10-CM

## 2024-06-17 DIAGNOSIS — D83.9 CVID (COMMON VARIABLE IMMUNODEFICIENCY) (MULTI): ICD-10-CM

## 2024-06-17 PROCEDURE — 3049F LDL-C 100-129 MG/DL: CPT | Performed by: ALLERGY & IMMUNOLOGY

## 2024-06-17 PROCEDURE — 3078F DIAST BP <80 MM HG: CPT | Performed by: ALLERGY & IMMUNOLOGY

## 2024-06-17 PROCEDURE — 3075F SYST BP GE 130 - 139MM HG: CPT | Performed by: ALLERGY & IMMUNOLOGY

## 2024-06-17 PROCEDURE — 99214 OFFICE O/P EST MOD 30 MIN: CPT | Performed by: ALLERGY & IMMUNOLOGY

## 2024-06-17 PROCEDURE — 82784 ASSAY IGA/IGD/IGG/IGM EACH: CPT

## 2024-06-17 PROCEDURE — 36415 COLL VENOUS BLD VENIPUNCTURE: CPT

## 2024-06-17 PROCEDURE — 1159F MED LIST DOCD IN RCRD: CPT | Performed by: ALLERGY & IMMUNOLOGY

## 2024-06-17 PROCEDURE — 4010F ACE/ARB THERAPY RXD/TAKEN: CPT | Performed by: ALLERGY & IMMUNOLOGY

## 2024-06-17 NOTE — PROGRESS NOTES
Patient ID: Юлия Ku is a 69 y.o. female.     Chief Complaint: follow up  History Of Present Illness  Юлия Ku is a 69 y.o. female with PMx hypogammaglobulinemia presenting for follow up.     Had 9 total infusions.  The brain fog has not change.  Using Zyrtec 5 mg-makes her too tired.-ok to use as needed.  Infusing now at night.    Chronic sclerosing alodentitis.    Did biopsy to confirm.      Food Allergy  No    Eczema/ Atopic Dermatitis  No    Asthma  No    Rhinoconjunctivitis  No    Drug Allergy   Reviewed in chart    Insect Allergy   No    Infections  No history of frequent or recurrent infections      Review of Systems    Pertinent positives and negatives have been assessed in the HPI. All other systems have been reviewed and are negative except as noted in the HPI.    Allergies  Mold, Codeine, and Lisinopril    Past Medical History  She has a past medical history of Disease of thyroid gland (graves 2008), Fatigue (12/2023), Personal history of irradiation, Personal history of other diseases of the circulatory system, Personal history of other endocrine, nutritional and metabolic disease, Personal history of other endocrine, nutritional and metabolic disease, Personal history of other endocrine, nutritional and metabolic disease, and Personal history of other endocrine, nutritional and metabolic disease.    Family History  Family History   Problem Relation Name Age of Onset    Alzheimer's disease Mother Sandeep Lambert     Hypertension Mother Sandeep Lambert     Cancer Mother Sandeep Lambert     Breast cancer Mother Sandeep Lambert     Heart disease Maternal Grandfather      Hypertension Father Chichi Lambert     Kidney disease Father Chichi Lambert        No history of food allergy or atopic disease in the family.    Surgical History  She has a past surgical history that includes Other surgical history (01/12/2021) and Breast biopsy (5/2008).    Social/Environmental History  She reports that she quit smoking about 46  years ago. Her smoking use included cigarettes. She started smoking about 48 years ago. She has a 0.5 pack-year smoking history. She has never used smokeless tobacco. She reports that she does not currently use alcohol. She reports that she does not use drugs.    MEDICATIONS  Current Outpatient Medications on File Prior to Visit   Medication Sig Dispense Refill    carbidopa-levodopa (Sinemet)  mg tablet Take 0.5 tablets by mouth.      cholecalciferol, vitD3,/vit K2 (vitamin D3-vitamin K2) 250 mcg (10,000 unit)-45 mcg capsule Take by mouth.      cycloSPORINE (Restasis) 0.05 % ophthalmic emulsion Administer 1 drop into both eyes every 12 hours.      EPINEPHrine 0.3 mg/0.3 mL injection syringe Inject 0.3 mL (0.3 mg) into the muscle 1 time if needed.      FLUoxetine (PROzac) 40 mg capsule Take 1 capsule (40 mg) by mouth once daily. 90 capsule 0    FreeStyle Srinivas 3 Sensor device USE AS DIRECTED TO CHECK BLOOD GLUCOSE. CHANGE EVERY 14 DAYS      insulin degludec (TRESIBA U-100 INSULIN SUBQ) Inject 10 Units under the skin every 7 days. Take as directed per insulin instructions.      levothyroxine (Synthroid, Levoxyl) 112 mcg tablet Take 1 tablet (112 mcg) by mouth once daily. 90 tablet 3    losartan (Cozaar) 50 mg tablet Take 1 tablet (50 mg) by mouth once daily. 90 tablet 1    metFORMIN XR (Glucophage-XR) 500 mg 24 hr tablet Take 1 tablet (500 mg) by mouth once daily in the evening. Take with meals.      MINERALS ORAL Take 1 tablet by mouth once daily.      NON FORMULARY Glia Plasmalogens      NON FORMULARY Take 2 each by mouth once daily. Ferrasorb      omega-3 fatty acids (SUPER OMEGA-3 ORAL) Take by mouth.      pilocarpine (Salagen) 5 mg tablet TAKE 1 TABLET(5 MG) BY MOUTH THREE TIMES DAILY 270 tablet 1    PreviDent 5000 Dry Mouth 1.1 % dental paste Apply to teeth once daily.      rosuvastatin (Crestor) 10 mg tablet Take 1 tablet (10 mg) by mouth once daily at bedtime. 90 tablet 1     No current  "facility-administered medications on file prior to visit.         Physical Exam  Visit Vitals  /70   Pulse 71   Temp 36.4 °C (97.6 °F) (Temporal)   Resp 17   Ht 1.6 m (5' 3\")   Wt 74.8 kg (165 lb)   SpO2 98%   BMI 29.23 kg/m²   OB Status Postmenopausal   Smoking Status Former   BSA 1.82 m²       Wt Readings from Last 1 Encounters:   06/17/24 74.8 kg (165 lb)       Physical Exam    General: Well appearing, no acute distress  Head: Normocephalic, atraumatic, neck supple without lymphadenopathy  Eyes/Ears:  non-injected, TM's pale  Nose: No nasal crease, nares patent, slightly boggy turbinates, minimal discharge  Throat: Normal dentition, no erythema  Heart: Regular rate and rhythm  Lungs: Clear to auscultation bilaterally, effort normal  Abdomen: Soft, non-tender, normal bowel sounds  Extremities: Moves all extremities symmetrically, no edema  Skin: No rashes/lesions  Psych: normal mood and affect    LAB RESULTS:  CBC:  Recent Labs     04/09/24  1250 09/12/23  1312 04/27/23  1240   WBC 7.0 5.8 5.5   HGB 15.3 13.5 13.7   HCT 46.0 42.3 42.6    320 265   MCV 98 93 91   EOSABS 0.08 0.06 0.07       CMP:  Recent Labs     04/09/24  1250 04/03/23  1131 11/03/21  1600    139 141   K 4.4 4.3 4.5    103 104   CO2 25 29 29   ANIONGAP 13 11 13   BUN 19 13 17   CREATININE 0.82 0.76 0.86   EGFR 79  --   --      Recent Labs     04/09/24  1250 04/03/23  1131 08/14/21  1048   ALBUMIN 4.3 4.6 4.2   ALKPHOS 46 43 48   ALT 6* 11 25   AST 13 13 19   BILITOT 0.7 0.8 0.6       ALLERGY:   Lab Results   Component Value Date    ICIGE 44.4 06/02/2023    WHITEASH 0.13 06/02/2023    SILVERBIRCH <0.10 06/02/2023    BOXELDER 0.11 06/02/2023    MOUNTJUNIPER <0.10 06/02/2023    COTTONWOOD <0.10 06/02/2023    ELM <0.10 06/02/2023    MULBERRY <0.10 06/02/2023    PECANHICKORY <0.10 06/02/2023    MAPLESYCAMOR <0.10 06/02/2023    OAK <0.10 06/02/2023    BERMUDAGR <0.10 06/02/2023    JOHNSONGR <0.10 06/02/2023    BLUEGRASS 0.12 " 06/02/2023    TIMOTHYGRASS <0.10 06/02/2023     Lab Results   Component Value Date    LAMBQUART <0.10 06/02/2023    PIGWEED <0.10 06/02/2023    COMRAGWEED <0.10 06/02/2023    SHEEPSOR <0.10 06/02/2023    PLANTAIN <0.10 06/02/2023    CATEPI 0.59 (A) 06/02/2023    DOGEPI 0.11 06/02/2023    ALTERNA <0.10 06/02/2023    CLADHERB <0.10 06/02/2023    ICA04 <0.10 06/02/2023    DERMFAR 1.50 (A) 06/02/2023    DERMPTE 0.92 (A) 06/02/2023    COCKR <0.10 06/02/2023       Recent Labs     06/02/23  1228   ICIGE 44.4     Recent Labs     04/09/24  1250 09/12/23  1312 04/27/23  1240   EOSABS 0.08 0.06 0.07     Recent Labs     06/02/23  1228   TRYPTASE 2.4       IMMUNO:   Recent Labs     10/30/23  0922 06/02/23  1228   * 469*  372*  105*  19  46   IGA 51*  --    IGM 41  --        HEME/ENDO:  Recent Labs     04/09/24  1250 01/09/24  1000 09/12/23  1312 09/07/23  0105 07/25/23  1356 06/15/23  1312 06/22/21  1445 02/06/21  0903   TSH 7.69*  --   --  0.75 37.75*  --    < > 0.91   HGBA1C  --  6.2*  --   --   --  6.2*  --  7.5   FERRITIN  --   --  19  --   --   --   --   --    IRONSAT  --   --  9*  --   --   --   --   --     < > = values in this interval not displayed.         Assessment/Plan   68 yo woman with a history of CVID who has been on infusions for 9 cycles now. She is concerned about her brain fog which continues and she needs to establish with oral and maxillofacial surgery after her recent biopsy results. I have suggested connecting with neurology and her current regular dentist. Will obtain labs and call results. Recent CMP ok, so deferred and immune cell profile not covered by patient insurance.  Plan follow up 6 months.    Fanny Tian DO

## 2024-06-18 LAB
IGA SERPL-MCNC: 72 MG/DL (ref 70–400)
IGG SERPL-MCNC: 870 MG/DL (ref 700–1600)
IGM SERPL-MCNC: 47 MG/DL (ref 40–230)

## 2024-07-08 DIAGNOSIS — E11.9 TYPE 2 DIABETES MELLITUS WITHOUT COMPLICATION, WITH LONG-TERM CURRENT USE OF INSULIN (MULTI): Primary | Chronic | ICD-10-CM

## 2024-07-08 DIAGNOSIS — E03.9 HYPOTHYROIDISM, UNSPECIFIED TYPE: ICD-10-CM

## 2024-07-08 DIAGNOSIS — Z79.4 TYPE 2 DIABETES MELLITUS WITHOUT COMPLICATION, WITH LONG-TERM CURRENT USE OF INSULIN (MULTI): Primary | Chronic | ICD-10-CM

## 2024-07-08 RX ORDER — LEVOTHYROXINE SODIUM 112 UG/1
112 TABLET ORAL DAILY
Qty: 90 TABLET | Refills: 3 | Status: SHIPPED | OUTPATIENT
Start: 2024-07-08 | End: 2025-07-08

## 2024-07-08 RX ORDER — METFORMIN HYDROCHLORIDE 500 MG/1
TABLET, EXTENDED RELEASE ORAL
Qty: 270 TABLET | Refills: 3 | Status: SHIPPED | OUTPATIENT
Start: 2024-07-08

## 2024-07-08 RX ORDER — METFORMIN HYDROCHLORIDE 500 MG/1
500 TABLET, EXTENDED RELEASE ORAL
Status: CANCELLED | OUTPATIENT
Start: 2024-07-08

## 2024-07-08 NOTE — TELEPHONE ENCOUNTER
----- Message from Юлия Ku sent at 7/8/2024 10:05 AM EDT -----  Regarding: Need a refill please  Contact: [phone]  Could I have a refill of METFORMIN ?  Thanks! Юлия

## 2024-07-16 ENCOUNTER — APPOINTMENT (OUTPATIENT)
Dept: ALLERGY | Facility: CLINIC | Age: 69
End: 2024-07-16
Payer: MEDICARE

## 2024-07-17 ENCOUNTER — HOSPITAL ENCOUNTER (OUTPATIENT)
Dept: RADIOLOGY | Facility: CLINIC | Age: 69
Discharge: HOME | End: 2024-07-17
Payer: MEDICARE

## 2024-07-17 ENCOUNTER — OFFICE VISIT (OUTPATIENT)
Dept: PRIMARY CARE | Facility: CLINIC | Age: 69
End: 2024-07-17
Payer: MEDICARE

## 2024-07-17 VITALS
WEIGHT: 158 LBS | TEMPERATURE: 97.5 F | DIASTOLIC BLOOD PRESSURE: 74 MMHG | BODY MASS INDEX: 27.99 KG/M2 | HEART RATE: 99 BPM | SYSTOLIC BLOOD PRESSURE: 124 MMHG | OXYGEN SATURATION: 95 %

## 2024-07-17 DIAGNOSIS — R07.81 RIB PAIN ON LEFT SIDE: ICD-10-CM
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
## 2024-07-17 DIAGNOSIS — Z79.4 TYPE 2 DIABETES MELLITUS WITHOUT COMPLICATION, WITH LONG-TERM CURRENT USE OF INSULIN (MULTI): Chronic | ICD-10-CM
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
## 2024-07-17 DIAGNOSIS — M35.01 KERATOCONJUNCTIVITIS SICCA, IN SJOGREN'S SYNDROME (MULTI): ICD-10-CM

## 2024-07-17 DIAGNOSIS — E89.0 POSTABLATIVE HYPOTHYROIDISM: Chronic | ICD-10-CM

## 2024-07-17 DIAGNOSIS — R41.89 BRAIN FOG: ICD-10-CM

## 2024-07-17 DIAGNOSIS — E11.9 TYPE 2 DIABETES MELLITUS WITHOUT COMPLICATION, WITH LONG-TERM CURRENT USE OF INSULIN (MULTI): Chronic | ICD-10-CM

## 2024-07-17 PROCEDURE — 71101 X-RAY EXAM UNILAT RIBS/CHEST: CPT | Mod: LT

## 2024-07-17 PROCEDURE — 99214 OFFICE O/P EST MOD 30 MIN: CPT | Performed by: FAMILY MEDICINE

## 2024-07-17 PROCEDURE — 1160F RVW MEDS BY RX/DR IN RCRD: CPT | Performed by: FAMILY MEDICINE

## 2024-07-17 PROCEDURE — 3049F LDL-C 100-129 MG/DL: CPT | Performed by: FAMILY MEDICINE

## 2024-07-17 PROCEDURE — 1159F MED LIST DOCD IN RCRD: CPT | Performed by: FAMILY MEDICINE

## 2024-07-17 PROCEDURE — 4010F ACE/ARB THERAPY RXD/TAKEN: CPT | Performed by: FAMILY MEDICINE

## 2024-07-17 PROCEDURE — 3074F SYST BP LT 130 MM HG: CPT | Performed by: FAMILY MEDICINE

## 2024-07-17 PROCEDURE — 1036F TOBACCO NON-USER: CPT | Performed by: FAMILY MEDICINE

## 2024-07-17 PROCEDURE — 3078F DIAST BP <80 MM HG: CPT | Performed by: FAMILY MEDICINE

## 2024-07-17 ASSESSMENT — ENCOUNTER SYMPTOMS: WEAKNESS: 1

## 2024-07-17 NOTE — ASSESSMENT & PLAN NOTE
Likely multifactorial.  Related to long COVID.  Patient also has had abnormal thyroid levels on her last labs.  Is due for follow-up to reassess.

## 2024-07-17 NOTE — PROGRESS NOTES
Subjective   Patient ID: Юлия Ku is a 69 y.o. female who presents for Rib Injury (L side rib pain x 3 days    Pt fell at home).    Юлия presents with her  after recent fall.  Patient was walking in her home when she reached for some papers and fell down onto her left side.  The left side of her chest hit the floor.  She has been having pain in her left ribs since.  She is not having any difficulty breathing. No head injury.    Currently she is paying over $300 for her pilocarpine and is wondering if there is a way to get the medicine more cheaply.    She is still experiencing significant brain fog.  Is due for follow-up with her endocrinologist to recheck her thyroid levels.  Also was set up for neurocognitive testing.    Has been wondering if metformin can be discontinued since patient has lost a significant amount of weight. She is still using insulin per endocrinology.          Review of Systems   Cardiovascular:  Positive for chest pain. Negative for leg swelling.   Neurological:  Positive for weakness.       Objective   /74   Pulse 99   Temp 36.4 °C (97.5 °F)   Wt 71.7 kg (158 lb)   SpO2 95%   BMI 27.99 kg/m²     Physical Exam  Constitutional:       General: She is not in acute distress.     Appearance: Normal appearance.   HENT:      Head: Normocephalic.   Pulmonary:      Effort: Pulmonary effort is normal.   Musculoskeletal:      Comments: Tenderness at left lateral ribs 6-8   Neurological:      General: No focal deficit present.      Mental Status: She is alert.   Psychiatric:         Mood and Affect: Mood normal.         Assessment/Plan   Problem List Items Addressed This Visit             ICD-10-CM    Type 2 diabetes mellitus (Multi) (Chronic) E11.9     Recommend continue metformin until can discuss with endocrinologist.         Postablative hypothyroidism (Chronic) E89.0     Previous TSH elevated.  Patient is due for follow-up with her endocrinologist.         Brain fog R41.89      Likely multifactorial.  Related to long COVID.  Patient also has had abnormal thyroid levels on her last labs.  Is due for follow-up to reassess.         Keratoconjunctivitis sicca, in Sjogren's syndrome (Multi) M35.01     Recommend use good Rx to get pilocarpine at a more affordable price.          Other Visit Diagnoses         Codes    Fall, initial encounter    -  Primary W19.XXXA    Recommend start using walker more often to prevent falls.     Relevant Orders    XR ribs left 2 views    Rib pain on left side     R07.81    Check x-ray to evaluate for fracture.  Continue to ice.     Relevant Orders    XR ribs left 2 views

## 2024-07-18 DIAGNOSIS — E89.0 POSTABLATIVE HYPOTHYROIDISM: ICD-10-CM

## 2024-07-24 ENCOUNTER — LAB (OUTPATIENT)
Dept: LAB | Facility: LAB | Age: 69
End: 2024-07-24
Payer: MEDICARE

## 2024-07-24 DIAGNOSIS — E89.0 POSTABLATIVE HYPOTHYROIDISM: ICD-10-CM

## 2024-07-24 LAB — TSH SERPL-ACNC: 0.03 MIU/L (ref 0.44–3.98)

## 2024-07-24 PROCEDURE — 84443 ASSAY THYROID STIM HORMONE: CPT

## 2024-07-24 PROCEDURE — 36415 COLL VENOUS BLD VENIPUNCTURE: CPT

## 2024-08-01 ENCOUNTER — APPOINTMENT (OUTPATIENT)
Dept: ENDOCRINOLOGY | Facility: CLINIC | Age: 69
End: 2024-08-01
Payer: MEDICARE

## 2024-08-01 VITALS
BODY MASS INDEX: 27.84 KG/M2 | DIASTOLIC BLOOD PRESSURE: 77 MMHG | SYSTOLIC BLOOD PRESSURE: 133 MMHG | WEIGHT: 157.19 LBS | HEART RATE: 81 BPM

## 2024-08-01 DIAGNOSIS — E03.9 HYPOTHYROIDISM, UNSPECIFIED TYPE: ICD-10-CM

## 2024-08-01 PROCEDURE — 99213 OFFICE O/P EST LOW 20 MIN: CPT | Performed by: INTERNAL MEDICINE

## 2024-08-01 PROCEDURE — 1159F MED LIST DOCD IN RCRD: CPT | Performed by: INTERNAL MEDICINE

## 2024-08-01 PROCEDURE — 3049F LDL-C 100-129 MG/DL: CPT | Performed by: INTERNAL MEDICINE

## 2024-08-01 PROCEDURE — 1036F TOBACCO NON-USER: CPT | Performed by: INTERNAL MEDICINE

## 2024-08-01 PROCEDURE — 3078F DIAST BP <80 MM HG: CPT | Performed by: INTERNAL MEDICINE

## 2024-08-01 PROCEDURE — 1160F RVW MEDS BY RX/DR IN RCRD: CPT | Performed by: INTERNAL MEDICINE

## 2024-08-01 PROCEDURE — 3075F SYST BP GE 130 - 139MM HG: CPT | Performed by: INTERNAL MEDICINE

## 2024-08-01 RX ORDER — LEVOTHYROXINE SODIUM 112 UG/1
TABLET ORAL
Qty: 90 TABLET | Refills: 3 | Status: SHIPPED | OUTPATIENT
Start: 2024-08-01

## 2024-08-01 NOTE — PROGRESS NOTES
"History Of Present Illness  Юлия Ku is a 69 y.o. female with postablative hypothyroidism  She has been on levothyroxine since 2009  Graves' disease, 131-iodine therapy in 2008    Levothyroxine increased 3 months ago from 100 to 112 mcg/day  Patient is taking levothyroxine on an empty stomach with water alone.    Brain fog is worse.     She changed BP med from losartan to her 's carvedilol because losartan \"stopped my brain from eating thyroid hormone.\"   She was told atenolol did the same.     Chronic sclerosing sialadenitis  Dr. Parker      Past Medical History  She has a past medical history of Disease of thyroid gland (graves 2008), Fatigue (12/2023), Personal history of irradiation, Personal history of other diseases of the circulatory system, Personal history of other endocrine, nutritional and metabolic disease, Personal history of other endocrine, nutritional and metabolic disease, Personal history of other endocrine, nutritional and metabolic disease, and Personal history of other endocrine, nutritional and metabolic disease.    Surgical History  She has a past surgical history that includes Other surgical history (01/12/2021) and Breast biopsy (5/2008).     Social History  She reports that she quit smoking about 46 years ago. Her smoking use included cigarettes. She started smoking about 48 years ago. She has a 0.5 pack-year smoking history. She has never used smokeless tobacco. She reports that she does not currently use alcohol. She reports that she does not use drugs.    Family History  Family History   Problem Relation Name Age of Onset    Alzheimer's disease Mother Sandeep Lambert     Hypertension Mother Sandeep Lambert     Cancer Mother Sandeep Lambert     Breast cancer Mother Sandeep Lambert     Heart disease Maternal Grandfather      Hypertension Father Chichi Lambert     Kidney disease Father Chichi Lambert        Medications  Current Outpatient Medications   Medication Instructions    carbidopa-levodopa " (Sinemet)  mg tablet 0.5 tablets, oral    cholecalciferol, vitD3,/vit K2 (vitamin D3-vitamin K2) 250 mcg (10,000 unit)-45 mcg capsule oral    cycloSPORINE (Restasis) 0.05 % ophthalmic emulsion 1 drop, Both Eyes, Every 12 hours    EPINEPHrine 0.3 mg/0.3 mL injection syringe 1 Syringe, intramuscular, Once as needed    FreeStyle Srinivas 3 Sensor device USE AS DIRECTED TO CHECK BLOOD GLUCOSE. CHANGE EVERY 14 DAYS    insulin degludec (TRESIBA U-100 INSULIN SUBQ) 10 Units, subcutaneous, Every 7 days, Take as directed per insulin instructions.    levothyroxine (SYNTHROID, LEVOXYL) 112 mcg, oral, Daily    metFORMIN  mg 24 hr tablet 2 tablets every morning and one tablet every evening    MINERALS ORAL 1 tablet, oral, Daily    NON FORMULARY Glia Plasmalogens    NON FORMULARY 2 each, oral, Daily, Ferrasorb    omega-3 fatty acids (SUPER OMEGA-3 ORAL) oral    pilocarpine (Salagen) 5 mg tablet TAKE 1 TABLET(5 MG) BY MOUTH THREE TIMES DAILY    PreviDent 5000 Dry Mouth 1.1 % dental paste dental, Daily    rosuvastatin (CRESTOR) 10 mg, oral, Nightly       Allergies  Mold, Codeine, and Lisinopril    Review of Systems   Eyes:         Dry eye   Psychiatric/Behavioral:          Memory loss         Last Recorded Vitals  Blood pressure 133/77, pulse 81, weight 71.3 kg (157 lb 3 oz).    Physical Exam  Constitutional:       General: She is not in acute distress.  Neurological:      Mental Status: She is alert.          Relevant Results  Lab Results   Component Value Date    TSH 0.03 (L) 07/24/2024    FREET4 0.80 07/25/2023    THYROIDPAB <28 06/02/2023         IMPRESSION  HYPOTHYROIDISM, POSTABLATIVE  TSH suppressed on Levothyroxine 112 mcg/day  Advised beta blockers vs ARB do not effect thyroid hormone efficacy or brain uptake.     Many symptoms of Long-COVID, confusion, brain fog      RECOMMENDATIONS  Levothyroxine 112 mcg six days per week, ONE HALF tablet one day per week  Take levothyroxine on an empty stomach with water alone,  1 hour before eating or taking other medications, 4 hours before any calcium or iron supplement.    Follow up 3 months  Repeat TSH before next appointment.     Defer BP med to Dr. Dupree.  Typically Losartan is approved with diabetes.  I do not know of any thyroid benefits of switching to carvedilol.

## 2024-08-01 NOTE — LETTER
"August 1, 2024     Bibiana Dupree DO  [address]  [address] [address]    Patient: Юлия Ku   YOB: 1955   Date of Visit: 8/1/2024       Dear Dr. Bibiana Dupree DO:    Thank you for referring Юлия Ku to me for evaluation. Below are my notes for this consultation.  If you have questions, please do not hesitate to call me. I look forward to following your patient along with you.       Sincerely,     Jered Rosales MD      CC: No Recipients  ______________________________________________________________________________________    History Of Present Illness  Юлия Ku is a 69 y.o. female with postablative hypothyroidism  She has been on levothyroxine since 2009  Graves' disease, 131-iodine therapy in 2008    Levothyroxine increased 3 months ago from 100 to 112 mcg/day  Patient is taking levothyroxine on an empty stomach with water alone.    Brain fog is worse.     She changed BP med from losartan to her 's carvedilol because losartan \"stopped my brain from eating thyroid hormone.\"   She was told atenolol did the same.     Chronic sclerosing sialadenitis  Dr. Parker      Past Medical History  She has a past medical history of Disease of thyroid gland (graves 2008), Fatigue (12/2023), Personal history of irradiation, Personal history of other diseases of the circulatory system, Personal history of other endocrine, nutritional and metabolic disease, Personal history of other endocrine, nutritional and metabolic disease, Personal history of other endocrine, nutritional and metabolic disease, and Personal history of other endocrine, nutritional and metabolic disease.    Surgical History  She has a past surgical history that includes Other surgical history (01/12/2021) and Breast biopsy (5/2008).     Social History  She reports that she quit smoking about 46 years ago. Her smoking use included cigarettes. She started smoking about 48 years ago. She " has a 0.5 pack-year smoking history. She has never used smokeless tobacco. She reports that she does not currently use alcohol. She reports that she does not use drugs.    Family History  Family History   Problem Relation Name Age of Onset   • Alzheimer's disease Mother Sandeep Lambert    • Hypertension Mother Sandeep Lambert    • Cancer Mother Sandeep Lambert    • Breast cancer Mother Sandeep Lambert    • Heart disease Maternal Grandfather     • Hypertension Father Chichi Lambert    • Kidney disease Father Chichi Lambert        Medications  Current Outpatient Medications   Medication Instructions   • carbidopa-levodopa (Sinemet)  mg tablet 0.5 tablets, oral   • cholecalciferol, vitD3,/vit K2 (vitamin D3-vitamin K2) 250 mcg (10,000 unit)-45 mcg capsule oral   • cycloSPORINE (Restasis) 0.05 % ophthalmic emulsion 1 drop, Both Eyes, Every 12 hours   • EPINEPHrine 0.3 mg/0.3 mL injection syringe 1 Syringe, intramuscular, Once as needed   • FreeStyle Srinivas 3 Sensor device USE AS DIRECTED TO CHECK BLOOD GLUCOSE. CHANGE EVERY 14 DAYS   • insulin degludec (TRESIBA U-100 INSULIN SUBQ) 10 Units, subcutaneous, Every 7 days, Take as directed per insulin instructions.   • levothyroxine (SYNTHROID, LEVOXYL) 112 mcg, oral, Daily   • metFORMIN  mg 24 hr tablet 2 tablets every morning and one tablet every evening   • MINERALS ORAL 1 tablet, oral, Daily   • NON FORMULARY Glia Plasmalogens   • NON FORMULARY 2 each, oral, Daily, Ferrasorb   • omega-3 fatty acids (SUPER OMEGA-3 ORAL) oral   • pilocarpine (Salagen) 5 mg tablet TAKE 1 TABLET(5 MG) BY MOUTH THREE TIMES DAILY   • PreviDent 5000 Dry Mouth 1.1 % dental paste dental, Daily   • rosuvastatin (CRESTOR) 10 mg, oral, Nightly       Allergies  Mold, Codeine, and Lisinopril    Review of Systems   Eyes:         Dry eye   Psychiatric/Behavioral:          Memory loss         Last Recorded Vitals  Blood pressure 133/77, pulse 81, weight 71.3 kg (157 lb 3 oz).    Physical Exam  Constitutional:        General: She is not in acute distress.  Neurological:      Mental Status: She is alert.          Relevant Results  Lab Results   Component Value Date    TSH 0.03 (L) 07/24/2024    FREET4 0.80 07/25/2023    THYROIDPAB <28 06/02/2023         IMPRESSION  HYPOTHYROIDISM, POSTABLATIVE  TSH suppressed on Levothyroxine 112 mcg/day  Advised beta blockers vs ARB do not effect thyroid hormone efficacy or brain uptake.     Many symptoms of Long-COVID, confusion, brain fog      RECOMMENDATIONS  Levothyroxine 112 mcg six days per week, ONE HALF tablet one day per week  Take levothyroxine on an empty stomach with water alone, 1 hour before eating or taking other medications, 4 hours before any calcium or iron supplement.    Follow up 3 months  Repeat TSH before next appointment.     Defer BP med to Dr. Dupree.  Typically Losartan is approved with diabetes.  I do not know of any thyroid benefits of switching to carvedilol.

## 2024-08-14 ENCOUNTER — TELEMEDICINE (OUTPATIENT)
Dept: PRIMARY CARE | Facility: CLINIC | Age: 69
End: 2024-08-14
Payer: MEDICARE

## 2024-08-14 DIAGNOSIS — U09.9 POST-ACUTE SEQUELAE OF COVID-19 (PASC): ICD-10-CM

## 2024-08-14 DIAGNOSIS — R41.89 BRAIN FOG: ICD-10-CM

## 2024-08-14 DIAGNOSIS — R41.89 COGNITIVE CHANGES: Primary | ICD-10-CM

## 2024-08-14 DIAGNOSIS — N64.4 BREAST PAIN, LEFT: ICD-10-CM

## 2024-08-14 PROCEDURE — 1160F RVW MEDS BY RX/DR IN RCRD: CPT | Performed by: FAMILY MEDICINE

## 2024-08-14 PROCEDURE — 99214 OFFICE O/P EST MOD 30 MIN: CPT | Performed by: FAMILY MEDICINE

## 2024-08-14 PROCEDURE — 3049F LDL-C 100-129 MG/DL: CPT | Performed by: FAMILY MEDICINE

## 2024-08-14 PROCEDURE — 1036F TOBACCO NON-USER: CPT | Performed by: FAMILY MEDICINE

## 2024-08-14 PROCEDURE — 1159F MED LIST DOCD IN RCRD: CPT | Performed by: FAMILY MEDICINE

## 2024-08-14 ASSESSMENT — ENCOUNTER SYMPTOMS
COUGH: 0
CONFUSION: 1
DECREASED CONCENTRATION: 1

## 2024-08-14 NOTE — ASSESSMENT & PLAN NOTE
Post-COVID syndrome may be contributing to brain fog. Patient is following with long-COVID clinic.

## 2024-08-14 NOTE — ASSESSMENT & PLAN NOTE
Patient following with [facility] neurologist. Neuropsychology assessment ordered but not yet done. New order for assessment placed. Recommend that she restart Aricept that was ordered by her neurologist.

## 2024-08-14 NOTE — PROGRESS NOTES
Subjective   Patient ID: Юлия Ku is a 69 y.o. female who presents for brain fog.    Patient consents to this telemedicine appointment and acknowledges its limitations.    I performed this visit using real-time telehealth tools, including an audio/video connection between Юлия Ku at home and Bibiana Dupree DO located at  [facility] in [place], [place].    Length of telemedicine visit: 9:02 minutes      Юлия is still experiencing considerable bran fog. She would like a prescription for Adderall to help with attention issues. She was given a prescription for Aricept by her neurologist but stopped taking it because it did not seem to be doing anything. Neuropsychology assessment was ordered but patient has not yet scheduled an assessment.     She has also been having sharp, shooting pain in her left outer breast. She has not noticed any lumps or swelling. No nipple discharge. She does not wear wire bras.          Review of Systems   HENT:  Negative for congestion.    Respiratory:  Negative for cough.    Cardiovascular:  Negative for chest pain.   Psychiatric/Behavioral:  Positive for confusion and decreased concentration.        Objective   There were no vitals taken for this visit.    Physical Exam  Constitutional:       General: She is not in acute distress.     Appearance: Normal appearance.   HENT:      Head: Normocephalic.   Pulmonary:      Effort: Pulmonary effort is normal.   Neurological:      General: No focal deficit present.      Mental Status: She is alert.   Psychiatric:         Mood and Affect: Mood normal.         Assessment/Plan   Problem List Items Addressed This Visit             ICD-10-CM    Cognitive changes - Primary R41.89     Patient following with [facility] neurologist. Neuropsychology assessment ordered but not yet done. New order for assessment placed. Recommend that she restart Aricept that was ordered by her neurologist.          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 Patient is following with long-COVID clinic.           Other Visit Diagnoses         Codes    Breast pain, left     N64.4    Imaging ordered to further assess. Recommend avoid caffeine.     Relevant Orders    BI mammo left diagnostic    BI US breast complete left

## 2024-08-27 ENCOUNTER — HOSPITAL ENCOUNTER (OUTPATIENT)
Dept: RADIOLOGY | Facility: HOSPITAL | Age: 69
Discharge: HOME | End: 2024-08-27
Payer: MEDICARE

## 2024-08-27 ENCOUNTER — APPOINTMENT (OUTPATIENT)
Dept: PRIMARY CARE | Facility: CLINIC | Age: 69
End: 2024-08-27
Payer: MEDICARE

## 2024-08-27 DIAGNOSIS — N64.4 BREAST PAIN, LEFT: ICD-10-CM

## 2024-08-27 PROCEDURE — 76642 ULTRASOUND BREAST LIMITED: CPT | Mod: LEFT SIDE | Performed by: RADIOLOGY

## 2024-08-27 PROCEDURE — 77065 DX MAMMO INCL CAD UNI: CPT | Mod: LEFT SIDE | Performed by: RADIOLOGY

## 2024-08-27 PROCEDURE — 77061 BREAST TOMOSYNTHESIS UNI: CPT | Mod: LT

## 2024-08-27 PROCEDURE — 76642 ULTRASOUND BREAST LIMITED: CPT | Mod: LT

## 2024-08-27 PROCEDURE — G0279 TOMOSYNTHESIS, MAMMO: HCPCS | Mod: LEFT SIDE | Performed by: RADIOLOGY

## 2024-09-12 ENCOUNTER — OFFICE VISIT (OUTPATIENT)
Dept: PULMONOLOGY | Facility: HOSPITAL | Age: 69
End: 2024-09-12
Payer: MEDICARE

## 2024-09-12 VITALS
OXYGEN SATURATION: 96 % | RESPIRATION RATE: 16 BRPM | HEIGHT: 63 IN | SYSTOLIC BLOOD PRESSURE: 127 MMHG | WEIGHT: 157 LBS | DIASTOLIC BLOOD PRESSURE: 75 MMHG | HEART RATE: 96 BPM | BODY MASS INDEX: 27.82 KG/M2

## 2024-09-12 DIAGNOSIS — G47.10 HYPERSOMNIA WITH SLEEP APNEA: ICD-10-CM

## 2024-09-12 DIAGNOSIS — R42 ORTHOSTATIC DIZZINESS: ICD-10-CM

## 2024-09-12 DIAGNOSIS — R06.02 SHORTNESS OF BREATH: Primary | ICD-10-CM

## 2024-09-12 DIAGNOSIS — G47.30 HYPERSOMNIA WITH SLEEP APNEA: ICD-10-CM

## 2024-09-12 PROCEDURE — 99214 OFFICE O/P EST MOD 30 MIN: CPT | Performed by: INTERNAL MEDICINE

## 2024-09-12 PROCEDURE — 3078F DIAST BP <80 MM HG: CPT | Performed by: INTERNAL MEDICINE

## 2024-09-12 PROCEDURE — 3008F BODY MASS INDEX DOCD: CPT | Performed by: INTERNAL MEDICINE

## 2024-09-12 PROCEDURE — 3074F SYST BP LT 130 MM HG: CPT | Performed by: INTERNAL MEDICINE

## 2024-09-12 PROCEDURE — 1036F TOBACCO NON-USER: CPT | Performed by: INTERNAL MEDICINE

## 2024-09-12 PROCEDURE — 3049F LDL-C 100-129 MG/DL: CPT | Performed by: INTERNAL MEDICINE

## 2024-09-12 PROCEDURE — 1159F MED LIST DOCD IN RCRD: CPT | Performed by: INTERNAL MEDICINE

## 2024-09-12 PROCEDURE — 99204 OFFICE O/P NEW MOD 45 MIN: CPT | Performed by: INTERNAL MEDICINE

## 2024-09-12 ASSESSMENT — ENCOUNTER SYMPTOMS
SHORTNESS OF BREATH: 1
DIZZINESS: 1

## 2024-09-12 NOTE — PATIENT INSTRUCTIONS
Please complete lung test, oxygen test and in-lab sleep study.  Take albuterol inhaler as needed for shortness of breath.  Please complete your ECHO for your heart.   Please follow up with neurology and cardiology for autonomic testing as discussed.   I will see you back in 2 months.

## 2024-09-12 NOTE — PROGRESS NOTES
Subjective   Patient ID: Юлия Ku is a 69 y.o. female who presents for Shortness of Breath (Patient is here for initial visit. Patient got COVID in 2021 and has had brain fog ever since, she is thinking it may be due to lack of oxygen. Patient admits to shortness of breath all the time and a lot of fatigue/snoring. Patient denies a cough. Patient is using nasal sprays and albuterol. Patient has not had albuterol in awhile but says it did help her when she took it. Patient is not on cpap or oxygen. Patient smoked .3 ppd from 1679-3952. Patient was exposed to minimal second hand smoke. ) and Cough (Patient has no animals. Patient has no concerning work history. Patient has no other concerns for today. ).  HPI  Ms. Claros is a 68 yo female with hx of Long Covid since 2021 is referred for further evaluation. Her main complaint is brain fog and also has shortness of breath, dry mouth but denies coughing and wheezing. She also gets occasional hiccups. Her  is concerned about lack of oxygen. She states she does not have brain fog when she lays down but only when she is upright. She has not smoked significantly but may be 1/3 ppd x 2 years but has not smoked since 1978. She did have some second hand smoke exposure at home and worked as a . She denies vaping or drinking alcohol. She has moved from [place] in 2020 and misses being there. Pt's sister lives in [place] and her mother lived here but passed away in Dec 2023. She denies leg swelling, chest pain. She admits to snoring, fatigue and daytime sleepiness. She takes albuterol MDI prn from her  which helps her breathing. She has HST in Nov 2023 which was considered a poor technical study.     Review of Systems   Respiratory:  Positive for shortness of breath.    Neurological:  Positive for dizziness.   All other systems reviewed and are negative.      Objective   Physical Exam  Vitals and nursing note reviewed.   Constitutional:        Appearance: Normal appearance.   HENT:      Head: Normocephalic.      Nose: Nose normal.      Mouth/Throat:      Pharynx: Oropharynx is clear.   Eyes:      Extraocular Movements: Extraocular movements intact.      Conjunctiva/sclera: Conjunctivae normal.      Pupils: Pupils are equal, round, and reactive to light.   Cardiovascular:      Rate and Rhythm: Normal rate and regular rhythm.      Pulses: Normal pulses.      Heart sounds: Normal heart sounds.   Pulmonary:      Effort: Pulmonary effort is normal.      Breath sounds: Normal breath sounds.   Abdominal:      General: Bowel sounds are normal.      Palpations: Abdomen is soft.   Musculoskeletal:         General: Normal range of motion.      Cervical back: Normal range of motion.   Skin:     General: Skin is warm.   Neurological:      General: No focal deficit present.      Mental Status: She is alert and oriented to person, place, and time. Mental status is at baseline.   Psychiatric:         Mood and Affect: Mood normal.         Behavior: Behavior normal.       Assessment/Plan   Shortness of breath  Hypersomnia with sleep apnea  Anxiety and depression  Hypogammaglobulinemia on IVIG  Reduced RV systolic function reported on ECHO, ?chronic cor pulmonale  6.   Iron deficiency  7.   Chronic allergic rhinitis to Dust mites and cat dander  8.   Orthostatic dizziness    Recommendations:  Patient's does not have hx of significant smoking but does have some hyperinflation on Chest Xray. We discussed differential of asthma vs ETS related COPD. She responds to albuterol MDI prn which I will continue. I will complete PFTs and assess if she will need long acting bronchodilator therapy.     -She has brain fog per chart review. On review of history it appears to be orthostatic dizziness as fog improves when lays down too. Recommend work up with neurology and cardiology including a Tilt test.  -Repeat ECHO  -Continue iron supplementation  -May benefit with loratadine prn. She  states she does not have significant symptoms currently  -Significant fatigue, EDS and has snoring. Mallamapti 3. HST with borderline reducing in O2 but was technically inadequate as belts did not stay on. Recommend an in-lab PSG asap.     Follow up in 2 months.

## 2024-09-16 ENCOUNTER — CLINICAL SUPPORT (OUTPATIENT)
Dept: AUDIOLOGY | Facility: HOSPITAL | Age: 69
End: 2024-09-16
Payer: MEDICARE

## 2024-09-16 DIAGNOSIS — R42 DIZZINESS: Primary | ICD-10-CM

## 2024-09-16 PROCEDURE — 92550 TYMPANOMETRY & REFLEX THRESH: CPT | Performed by: AUDIOLOGIST

## 2024-09-16 PROCEDURE — 92557 COMPREHENSIVE HEARING TEST: CPT | Performed by: AUDIOLOGIST

## 2024-09-16 ASSESSMENT — PAIN - FUNCTIONAL ASSESSMENT: PAIN_FUNCTIONAL_ASSESSMENT: 0-10

## 2024-09-16 ASSESSMENT — PAIN SCALES - GENERAL: PAINLEVEL_OUTOF10: 0 - NO PAIN

## 2024-09-16 NOTE — LETTER
2024         Lizandro Mathew MD  [address] [facility] [address]      Patient: Юлия Ku   YOB: 1955   Date of Visit: 2024       Dear Lizandro Mathew MD:    Thank you for referring Юлия Ku to me for evaluation. Below are my notes for this consultation.  If you have questions, please do not hesitate to call me. I look forward to following your patient along with you.       Sincerely,     PREETI Carrington, CCC-A  Senior Audiologist      CC:   Bibiana Dupree, DO  ______________________________________________________________________________________    AUDIOLOGY ADULT AUDIOMETRIC EVALUATION      Name:  Юлия Ku  :  1955  Age:  69 y.o.  Date of Evaluation:  2024     IMPRESSIONS:  Today's test results are consistent with normal hearing sensitivity with excellent word discrimination and normal tympanic membrane mobility bilaterally     RECOMMENDATIONS:  -Medical / Otolaryngology consultation.  Follow-up with Dr. Mathew  -Schedule Videonystagmography as already ordered (order is scanned into media section of Epic)    -Annual audiologic monitoring, or as changes are noted.    ------------------------------------------------    HISTORY:  Reason for visit:  Ms. Ku is seen today at the request of Lizandro Mathew MD, for an evaluation of hearing.  Patient complains of Dizziness  She reports no known hearing loss today.      Dizziness:  yes, feels dizzy when looking side to side, feels lightheaded with no spinning sensation.  She feels like she will fall, with multiple falls.  Onset about 2 years ago.  She has seen neurology and takes dopamine, which does not seem to relieve symptoms.    Tinnitus:   yes,right ear, occurs intermittently, described as ringing, onset about 2-3 months ago, not generally bothersome but does notice more at night, not pulsatile  Family history of hearing loss:  yes, father with onset in older  adulthood    Previous hearing test:  no  Otalgia:  no  Aural Fullness:  no  Otitis Media: no  PE Tubes:  no  Other otologic surgical history:  no  Noise Exposure: no  Hearing aid history: none  Other significant history: none    EVALUATION    Otoscopic Evaluation:        Clear ear canal, tympanic membrane visualized bilaterally           Immittance Measures: 226Hz       Both ears:  Tympanogram shows normal middle ear pressure, static compliance, and ear canal volume.  Acoustic reflexes were consistent with pure tone results.    Pure Tone Audiometry:  Conventional audiometry (125-8000Hz) via insert earphones with good response reliability      Both ears: normal hearing sensitivity     Speech Audiometry:        Both ears:  Speech Reception Threshold (SRT) was obtained at 10 dBHL                 Word Recognition scores were excellent at 40dBSL re: SRT                   Distortion Product Otoacoustic Emissions: Assesses the cochlear outer hair cell function (0161-7661 Hz frequency range)        Right ear:  present 1500-6000Hz, absent 8000Hz       Left ear:  present 1500-3000Hz, 6000Hz,     absent 4000 and 8000Hz        PATIENT EDUCATION:   Discussed results and recommendations with Ms. Ku.  Questions were addressed and the patient was encouraged to contact our department should concerns arise.    Time:  10:45 to 11:12    PREETI Carrington, CCC-A  Senior Audiologist

## 2024-09-16 NOTE — PROGRESS NOTES
AUDIOLOGY ADULT AUDIOMETRIC EVALUATION      Name:  Юлия Ku  :  1955  Age:  69 y.o.  Date of Evaluation:  2024     IMPRESSIONS:  Today's test results are consistent with normal hearing sensitivity with excellent word discrimination and normal tympanic membrane mobility bilaterally     RECOMMENDATIONS:  -Medical / Otolaryngology consultation.  Follow-up with Dr. Mathew  -Schedule Videonystagmography as already ordered (order is scanned into media section of Epic)    -Annual audiologic monitoring, or as changes are noted.    ------------------------------------------------    HISTORY:  Reason for visit:  Ms. Ku is seen today at the request of Lizandro Mathew MD, for an evaluation of hearing.  Patient complains of Dizziness  She reports no known hearing loss today.      Dizziness:  yes, feels dizzy when looking side to side, feels lightheaded with no spinning sensation.  She feels like she will fall, with multiple falls.  Onset about 2 years ago.  She has seen neurology and takes dopamine, which does not seem to relieve symptoms.    Tinnitus:   yes,right ear, occurs intermittently, described as ringing, onset about 2-3 months ago, not generally bothersome but does notice more at night, not pulsatile  Family history of hearing loss:  yes, father with onset in older adulthood    Previous hearing test:  no  Otalgia:  no  Aural Fullness:  no  Otitis Media: no  PE Tubes:  no  Other otologic surgical history:  no  Noise Exposure: no  Hearing aid history: none  Other significant history: none    EVALUATION    Otoscopic Evaluation:        Clear ear canal, tympanic membrane visualized bilaterally           Immittance Measures: 226Hz       Both ears:  Tympanogram shows normal middle ear pressure, static compliance, and ear canal volume.  Acoustic reflexes were consistent with pure tone results.    Pure Tone Audiometry:  Conventional audiometry (125-8000Hz) via insert earphones with good response  reliability      Both ears: normal hearing sensitivity     Speech Audiometry:        Both ears:  Speech Reception Threshold (SRT) was obtained at 10 dBHL                 Word Recognition scores were excellent at 40dBSL re: SRT                   Distortion Product Otoacoustic Emissions: Assesses the cochlear outer hair cell function (4126-6275 Hz frequency range)        Right ear:  present 1500-6000Hz, absent 8000Hz       Left ear:  present 1500-3000Hz, 6000Hz,     absent 4000 and 8000Hz        PATIENT EDUCATION:   Discussed results and recommendations with Ms. Ku.  Questions were addressed and the patient was encouraged to contact our department should concerns arise.    Time:  10:45 to 11:12    PREETI Carrington, CCC-A  Senior Audiologist

## 2024-09-20 ENCOUNTER — TELEPHONE (OUTPATIENT)
Dept: CARDIOLOGY | Facility: HOSPITAL | Age: 69
End: 2024-09-20
Payer: MEDICARE

## 2024-09-20 DIAGNOSIS — R93.1 ABNORMAL ECHOCARDIOGRAPHY: Primary | ICD-10-CM

## 2024-09-20 DIAGNOSIS — I51.9 RIGHT VENTRICULAR DYSFUNCTION: ICD-10-CM

## 2024-09-20 NOTE — TELEPHONE ENCOUNTER
----- Message from Padmini KNIGHT sent at 2024 11:40 AM EDT -----  Regarding: New Echo Order Needed  Hi Renee,     Patient called to schedule her echo ordered 2023 by Dr. Funez. Unable to schedule as order will  before schedule date. Would you be willing to please place a new order for this? Patient saw Dr. Lundberg  where he recommended she complete the echo.     Thank you!

## 2024-09-25 DIAGNOSIS — Z77.120 CONTACT WITH AND (SUSPECTED) EXPOSURE TO MOLD (TOXIC): Primary | ICD-10-CM

## 2024-09-25 DIAGNOSIS — G90.3 MULTI-SYSTEM DEGENERATION OF THE AUTONOMIC NERVOUS SYSTEM: ICD-10-CM

## 2024-09-26 ENCOUNTER — LAB (OUTPATIENT)
Dept: LAB | Facility: LAB | Age: 69
End: 2024-09-26
Payer: MEDICARE

## 2024-09-26 ENCOUNTER — APPOINTMENT (OUTPATIENT)
Dept: PULMONOLOGY | Facility: HOSPITAL | Age: 69
End: 2024-09-26
Payer: MEDICARE

## 2024-09-26 DIAGNOSIS — Z77.120 CONTACT WITH AND (SUSPECTED) EXPOSURE TO MOLD (TOXIC): ICD-10-CM

## 2024-09-26 DIAGNOSIS — G90.3 MULTI-SYSTEM DEGENERATION OF THE AUTONOMIC NERVOUS SYSTEM: ICD-10-CM

## 2024-09-26 LAB
25(OH)D3 SERPL-MCNC: 76 NG/ML (ref 30–100)
BASOPHILS # BLD AUTO: 0.04 X10*3/UL (ref 0–0.1)
BASOPHILS NFR BLD AUTO: 1 %
CMV IGG AVIDITY SERPL IA-RTO: REACTIVE %
CORTIS SERPL-MCNC: 23.4 UG/DL (ref 2.5–20)
CRP SERPL HS-MCNC: 0.3 MG/L
DHEA-S SERPL-MCNC: 108 UG/DL (ref 13–130)
EBV EA IGG SER QL: POSITIVE
EBV NA AB SER QL: POSITIVE
EBV VCA IGG SER IA-ACNC: POSITIVE
EBV VCA IGM SER IA-ACNC: NEGATIVE
EOSINOPHIL # BLD AUTO: 0.12 X10*3/UL (ref 0–0.7)
EOSINOPHIL NFR BLD AUTO: 2.9 %
ERYTHROCYTE [DISTWIDTH] IN BLOOD BY AUTOMATED COUNT: 12.4 % (ref 11.5–14.5)
EST. AVERAGE GLUCOSE BLD GHB EST-MCNC: 140 MG/DL
ESTRADIOL SERPL-MCNC: <19 PG/ML
GGT SERPL-CCNC: 12 U/L (ref 5–55)
HBA1C MFR BLD: 6.5 %
HCT VFR BLD AUTO: 44.1 % (ref 36–46)
HCYS SERPL-SCNC: 13.18 UMOL/L (ref 5–13.9)
HGB BLD-MCNC: 15 G/DL (ref 12–16)
IMM GRANULOCYTES # BLD AUTO: 0.01 X10*3/UL (ref 0–0.7)
IMM GRANULOCYTES NFR BLD AUTO: 0.2 % (ref 0–0.9)
INSULIN P FAST SERPL-ACNC: 31 UIU/ML (ref 3–25)
LYMPHOCYTES # BLD AUTO: 1.3 X10*3/UL (ref 1.2–4.8)
LYMPHOCYTES NFR BLD AUTO: 31.1 %
MCH RBC QN AUTO: 32.1 PG (ref 26–34)
MCHC RBC AUTO-ENTMCNC: 34 G/DL (ref 32–36)
MCV RBC AUTO: 94 FL (ref 80–100)
MONOCYTES # BLD AUTO: 0.4 X10*3/UL (ref 0.1–1)
MONOCYTES NFR BLD AUTO: 9.6 %
NEUTROPHILS # BLD AUTO: 2.31 X10*3/UL (ref 1.2–7.7)
NEUTROPHILS NFR BLD AUTO: 55.2 %
NRBC BLD-RTO: 0 /100 WBCS (ref 0–0)
PLATELET # BLD AUTO: 242 X10*3/UL (ref 150–450)
PROGEST SERPL-MCNC: 0.3 NG/ML
RBC # BLD AUTO: 4.68 X10*6/UL (ref 4–5.2)
T3FREE SERPL-MCNC: 4.2 PG/ML (ref 2.3–4.2)
T4 FREE SERPL-MCNC: 1.69 NG/DL (ref 0.61–1.12)
THYROPEROXIDASE AB SERPL-ACNC: 75 IU/ML
TSH SERPL-ACNC: 0.09 MIU/L (ref 0.44–3.98)
URATE SERPL-MCNC: 4.5 MG/DL (ref 2.3–6.7)
VIT B12 SERPL-MCNC: 850 PG/ML (ref 211–911)
WBC # BLD AUTO: 4.2 X10*3/UL (ref 4.4–11.3)

## 2024-09-26 PROCEDURE — 85025 COMPLETE CBC W/AUTO DIFF WBC: CPT

## 2024-09-26 PROCEDURE — 82306 VITAMIN D 25 HYDROXY: CPT

## 2024-09-28 LAB
CMV IGM SERPL-ACNC: 11.8 AU/ML
COPPER SERPL-MCNC: 84.6 UG/DL (ref 80–155)
MAGNESIUM RBC-MCNC: 5.7 MG/DL (ref 3.6–7.5)
SELENIUM SERPL-MCNC: 151.5 UG/L (ref 23–190)
ZINC SERPL-MCNC: 74.5 UG/DL (ref 60–120)

## 2024-09-29 LAB — B BURGDOR.VLSE1+PEPC10 AB SER IA-ACNC: 0.2 IV

## 2024-09-30 ENCOUNTER — TELEPHONE (OUTPATIENT)
Dept: PRIMARY CARE | Facility: CLINIC | Age: 69
End: 2024-09-30
Payer: MEDICARE

## 2024-09-30 DIAGNOSIS — E03.9 HYPOTHYROIDISM, UNSPECIFIED TYPE: ICD-10-CM

## 2024-09-30 LAB
A-TOCOPHEROL VIT E SERPL-MCNC: 15.1 MG/L (ref 5.5–18)
BETA+GAMMA TOCOPHEROL SERPL-MCNC: 0.6 MG/L (ref 0–6)
LEPTIN SERPL-MCNC: 12.5 NG/ML (ref 0.5–15.2)
PYRIDOXAL PHOS SERPL-SCNC: 13.2 NMOL/L (ref 20–125)
TESTOSTERONE FREE (CHAN): 2 PG/ML (ref 0.1–6.4)
TESTOSTERONE,TOTAL,LC-MS/MS: 22 NG/DL (ref 2–45)

## 2024-09-30 RX ORDER — LEVOTHYROXINE SODIUM 88 UG/1
88 TABLET ORAL DAILY
Qty: 90 TABLET | Refills: 3 | Status: SHIPPED | OUTPATIENT
Start: 2024-09-30 | End: 2025-09-30

## 2024-09-30 NOTE — TELEPHONE ENCOUNTER
Kim from [facility] called with cancelled test. Melanocyte-stimulating hormone alpha has been cancelled due to quality not sufficient. Thanks. KALI

## 2024-10-01 ENCOUNTER — LAB (OUTPATIENT)
Dept: LAB | Facility: LAB | Age: 69
End: 2024-10-01
Payer: MEDICARE

## 2024-10-01 DIAGNOSIS — Z77.120 CONTACT WITH AND (SUSPECTED) EXPOSURE TO MOLD (TOXIC): ICD-10-CM

## 2024-10-01 DIAGNOSIS — G90.3 MULTI-SYSTEM DEGENERATION OF THE AUTONOMIC NERVOUS SYSTEM: ICD-10-CM

## 2024-10-01 DIAGNOSIS — Z77.120 CONTACT WITH AND (SUSPECTED) EXPOSURE TO MOLD (TOXIC): Primary | ICD-10-CM

## 2024-10-01 LAB
PREG SERPL-MCNC: 103 NG/DL (ref 15–132)
SCAN RESULT: NORMAL
VIT B1 PYROPHOSHATE BLD-SCNC: 131 NMOL/L (ref 70–180)

## 2024-10-01 PROCEDURE — 36415 COLL VENOUS BLD VENIPUNCTURE: CPT

## 2024-10-01 PROCEDURE — 83519 RIA NONANTIBODY: CPT

## 2024-10-01 PROCEDURE — 83520 IMMUNOASSAY QUANT NOS NONAB: CPT

## 2024-10-01 NOTE — TELEPHONE ENCOUNTER
Called and spoke with Kim at [facility] and informed her that we are not ordering provider and to contact them for cancelled test

## 2024-10-02 LAB
C ALBICANS AB SER QL ID: NEGATIVE
SCAN RESULT: NORMAL
VASOPRESSIN SERPL-MCNC: 2.1 PG/ML (ref 0–6.9)

## 2024-10-03 LAB
T3REVERSE SERPL-MCNC: 21.1 NG/DL (ref 9–27)
VEGF SERPL-MCNC: 47 PG/ML (ref 9–86)

## 2024-10-04 ENCOUNTER — APPOINTMENT (OUTPATIENT)
Dept: PRIMARY CARE | Facility: CLINIC | Age: 69
End: 2024-10-04
Payer: MEDICARE

## 2024-10-05 DIAGNOSIS — I10 BENIGN ESSENTIAL HYPERTENSION: ICD-10-CM

## 2024-10-05 LAB — VEGF SERPL-MCNC: 47 PG/ML (ref 9–86)

## 2024-10-07 ENCOUNTER — HOSPITAL ENCOUNTER (OUTPATIENT)
Dept: RESPIRATORY THERAPY | Facility: HOSPITAL | Age: 69
Discharge: HOME | End: 2024-10-07
Payer: MEDICARE

## 2024-10-07 DIAGNOSIS — R06.02 SHORTNESS OF BREATH: ICD-10-CM

## 2024-10-07 PROCEDURE — 94640 AIRWAY INHALATION TREATMENT: CPT | Mod: 59

## 2024-10-07 PROCEDURE — 2500000001 HC RX 250 WO HCPCS SELF ADMINISTERED DRUGS (ALT 637 FOR MEDICARE OP): Performed by: INTERNAL MEDICINE

## 2024-10-07 PROCEDURE — 94726 PLETHYSMOGRAPHY LUNG VOLUMES: CPT

## 2024-10-07 PROCEDURE — 94618 PULMONARY STRESS TESTING: CPT

## 2024-10-07 RX ORDER — ALBUTEROL SULFATE 90 UG/1
4 INHALANT RESPIRATORY (INHALATION) ONCE
Status: COMPLETED | OUTPATIENT
Start: 2024-10-07 | End: 2024-10-07

## 2024-10-07 RX ORDER — LOSARTAN POTASSIUM 50 MG/1
50 TABLET ORAL DAILY
Qty: 90 TABLET | Refills: 0 | OUTPATIENT
Start: 2024-10-07

## 2024-10-07 NOTE — PROGRESS NOTES
Home O2 Evaluation:    SpO2 on room air at rest:     95%    SpO2 on room air with exertion:     94%    SpO2 on oxygen at rest:     %    SpO2 on oxygen with exertion:     %    SpO2 on 4L/min O2 with exertion:    %    Ambulation distance:      269 ft    Recommended O2 device: RA    Recommended O2 L/min:    Recommended FiO2 %:     Pre/Post HR: 91/113 bpm. No SOB noted.     Ling Monzon, RRT

## 2024-10-08 LAB
MGC ASCENT PFT - FEV1 - POST: 1.83
MGC ASCENT PFT - FEV1 - PRE: 2.09
MGC ASCENT PFT - FEV1 - PREDICTED: 2.12
MGC ASCENT PFT - FVC - POST: 2.61
MGC ASCENT PFT - FVC - PRE: 3.07
MGC ASCENT PFT - FVC - PREDICTED: 2.69
SCAN RESULT: ABNORMAL

## 2024-10-10 ENCOUNTER — TELEPHONE (OUTPATIENT)
Dept: PULMONOLOGY | Facility: HOSPITAL | Age: 69
End: 2024-10-10
Payer: MEDICARE

## 2024-10-10 NOTE — TELEPHONE ENCOUNTER
Patient acknowledged understanding. All questions answered at this time.     ----- Message from Wallace Lundberg sent at 10/9/2024  3:40 PM EDT -----  Please advise patient that she had mild obstruction and air trapping on PFTs consistent with, what I explained during the office visit, COPD. Continue with albuterol inhaler as I ordered and complete other testing that I ordered for including autonomic testing. There was no oxygen desaturation on 6MWT to need O2 supplementation with activity. I will go over in more detail on her next visit.

## 2024-10-16 ENCOUNTER — HOSPITAL ENCOUNTER (OUTPATIENT)
Dept: CARDIOLOGY | Facility: HOSPITAL | Age: 69
Discharge: HOME | End: 2024-10-16
Payer: MEDICARE

## 2024-10-16 DIAGNOSIS — R93.1 ABNORMAL ECHOCARDIOGRAPHY: ICD-10-CM

## 2024-10-16 DIAGNOSIS — I51.9 RIGHT VENTRICULAR DYSFUNCTION: ICD-10-CM

## 2024-10-16 LAB
AORTIC VALVE MEAN GRADIENT: 4.4 MMHG
AORTIC VALVE PEAK VELOCITY: 1.4 M/S
AV PEAK GRADIENT: 7.8 MMHG
AVA (PEAK VEL): 1.46 CM2
AVA (VTI): 1.57 CM2
EJECTION FRACTION: 56 %
LEFT ATRIUM VOLUME AREA LENGTH INDEX BSA: 18.5 ML/M2
LEFT VENTRICLE INTERNAL DIMENSION DIASTOLE: 4.45 CM (ref 3.5–6)
LEFT VENTRICULAR OUTFLOW TRACT DIAMETER: 1.74 CM
MITRAL VALVE E/A RATIO: 0.67
MITRAL VALVE E/E' RATIO: 7.18
RIGHT VENTRICLE FREE WALL PEAK S': 12.02 CM/S
TRICUSPID ANNULAR PLANE SYSTOLIC EXCURSION: 2.4 CM

## 2024-10-16 PROCEDURE — 93306 TTE W/DOPPLER COMPLETE: CPT | Performed by: INTERNAL MEDICINE

## 2024-10-16 PROCEDURE — 93306 TTE W/DOPPLER COMPLETE: CPT

## 2024-10-17 RX ORDER — LOSARTAN POTASSIUM 50 MG/1
50 TABLET ORAL DAILY
COMMUNITY
End: 2024-10-17 | Stop reason: SDUPTHER

## 2024-10-17 RX ORDER — LOSARTAN POTASSIUM 50 MG/1
50 TABLET ORAL DAILY
Qty: 90 TABLET | Refills: 1 | Status: SHIPPED | OUTPATIENT
Start: 2024-10-17

## 2024-10-17 NOTE — TELEPHONE ENCOUNTER
Pt called asking for rx refil on:    Losartan - Med was discontinued by a Dr. Rosales during a med list clean up    Send to:    [id number] [place] - [address]     Last OV: 07/17  Next OV: 11/08

## 2024-10-17 NOTE — RESULT ENCOUNTER NOTE
Results reviewed. No critical results, right ventricular function is now normal-follow up as usual to discuss in details.

## 2024-10-17 NOTE — TELEPHONE ENCOUNTER
Med was DC off list by different doctor.  Pt states she needs this medication.  OK for refill to [facility]? Thanks, CG

## 2024-10-22 DIAGNOSIS — E03.9 HYPOTHYROIDISM, UNSPECIFIED TYPE: Primary | ICD-10-CM

## 2024-10-22 DIAGNOSIS — E89.0 POSTABLATIVE HYPOTHYROIDISM: ICD-10-CM

## 2024-10-22 LAB — A-MSH SERPL-MCNC: NORMAL PG/ML

## 2024-10-22 RX ORDER — LEVOTHYROXINE SODIUM 88 UG/1
88 TABLET ORAL DAILY
Qty: 90 TABLET | Refills: 3 | Status: SHIPPED | OUTPATIENT
Start: 2024-10-22 | End: 2025-10-22

## 2024-10-29 PROBLEM — R42 DIZZINESS: Status: ACTIVE | Noted: 2024-09-04

## 2024-10-29 RX ORDER — DONEPEZIL HYDROCHLORIDE 5 MG/1
5 TABLET, FILM COATED ORAL NIGHTLY
COMMUNITY
Start: 2024-09-21 | End: 2024-11-08 | Stop reason: ALTCHOICE

## 2024-10-29 RX ORDER — DEXTROAMPHETAMINE SACCHARATE, AMPHETAMINE ASPARTATE MONOHYDRATE, DEXTROAMPHETAMINE SULFATE AND AMPHETAMINE SULFATE 2.5; 2.5; 2.5; 2.5 MG/1; MG/1; MG/1; MG/1
1 CAPSULE, EXTENDED RELEASE ORAL
COMMUNITY
Start: 2024-09-29 | End: 2024-11-08 | Stop reason: ALTCHOICE

## 2024-10-29 RX ORDER — FLUOXETINE HYDROCHLORIDE 20 MG/1
20 CAPSULE ORAL DAILY
COMMUNITY
End: 2024-11-08 | Stop reason: ALTCHOICE

## 2024-11-06 ENCOUNTER — APPOINTMENT (OUTPATIENT)
Dept: PULMONOLOGY | Facility: HOSPITAL | Age: 69
End: 2024-11-06
Payer: MEDICARE

## 2024-11-06 VITALS
OXYGEN SATURATION: 95 % | HEIGHT: 64 IN | DIASTOLIC BLOOD PRESSURE: 80 MMHG | WEIGHT: 161 LBS | BODY MASS INDEX: 27.49 KG/M2 | HEART RATE: 88 BPM | TEMPERATURE: 96.8 F | SYSTOLIC BLOOD PRESSURE: 130 MMHG | RESPIRATION RATE: 16 BRPM

## 2024-11-06 DIAGNOSIS — F32.A ANXIETY AND DEPRESSION: ICD-10-CM

## 2024-11-06 DIAGNOSIS — R42 ORTHOSTATIC DIZZINESS: ICD-10-CM

## 2024-11-06 DIAGNOSIS — D80.1 HYPOGAMMAGLOBULINEMIA (MULTI): ICD-10-CM

## 2024-11-06 DIAGNOSIS — G47.10 HYPERSOMNIA WITH SLEEP APNEA: ICD-10-CM

## 2024-11-06 DIAGNOSIS — F41.9 ANXIETY AND DEPRESSION: ICD-10-CM

## 2024-11-06 DIAGNOSIS — G47.30 HYPERSOMNIA WITH SLEEP APNEA: ICD-10-CM

## 2024-11-06 DIAGNOSIS — J45.40 MODERATE PERSISTENT ASTHMA WITHOUT COMPLICATION (HHS-HCC): Primary | ICD-10-CM

## 2024-11-06 PROCEDURE — 4010F ACE/ARB THERAPY RXD/TAKEN: CPT | Performed by: INTERNAL MEDICINE

## 2024-11-06 PROCEDURE — 99214 OFFICE O/P EST MOD 30 MIN: CPT | Performed by: INTERNAL MEDICINE

## 2024-11-06 PROCEDURE — 3075F SYST BP GE 130 - 139MM HG: CPT | Performed by: INTERNAL MEDICINE

## 2024-11-06 PROCEDURE — 3008F BODY MASS INDEX DOCD: CPT | Performed by: INTERNAL MEDICINE

## 2024-11-06 PROCEDURE — 1159F MED LIST DOCD IN RCRD: CPT | Performed by: INTERNAL MEDICINE

## 2024-11-06 PROCEDURE — 3049F LDL-C 100-129 MG/DL: CPT | Performed by: INTERNAL MEDICINE

## 2024-11-06 PROCEDURE — 3079F DIAST BP 80-89 MM HG: CPT | Performed by: INTERNAL MEDICINE

## 2024-11-06 PROCEDURE — 3044F HG A1C LEVEL LT 7.0%: CPT | Performed by: INTERNAL MEDICINE

## 2024-11-06 PROCEDURE — 1036F TOBACCO NON-USER: CPT | Performed by: INTERNAL MEDICINE

## 2024-11-06 RX ORDER — FLUTICASONE PROPIONATE AND SALMETEROL 500; 50 UG/1; UG/1
1 POWDER RESPIRATORY (INHALATION)
Qty: 1 EACH | Refills: 11 | Status: SHIPPED | OUTPATIENT
Start: 2024-11-06 | End: 2024-11-06 | Stop reason: WASHOUT

## 2024-11-06 RX ORDER — FLUTICASONE PROPIONATE AND SALMETEROL XINAFOATE 230; 21 UG/1; UG/1
2 AEROSOL, METERED RESPIRATORY (INHALATION)
Qty: 12 G | Refills: 11 | Status: SHIPPED | OUTPATIENT
Start: 2024-11-06 | End: 2025-11-06

## 2024-11-06 ASSESSMENT — ENCOUNTER SYMPTOMS
DIZZINESS: 1
SHORTNESS OF BREATH: 1

## 2024-11-06 NOTE — PATIENT INSTRUCTIONS
Take Advair HFA 2 puff in the morning and 2 puff in the evening. Rinse mouth after each inhalation.  Please complete an in-lab sleep study asap.  Take albuterol inhaler as needed for shortness of breath.  Please follow up with neurology and cardiology for autonomic testing as discussed.   Shikha IRBY will see you back in 6 months.

## 2024-11-06 NOTE — PROGRESS NOTES
Subjective   Patient ID: Юлия Ku is a 69 y.o. female who presents for Shortness of Breath (Patient is here for follow up visit. Patient states her breathing is good. Patient denies cough. Patient is using her  rescue inhaler 3x weekly. Patient  had PFT test done. Patient has no new concerns. ).  Shortness of Breath    Ms. Claros is a 68 yo female with hx of Long Covid since 2021 is referred for further evaluation. Her main complaint is brain fog and also has shortness of breath, dry mouth but denies coughing and wheezing. She also gets occasional hiccups. Her  is concerned about lack of oxygen. She states she does not have brain fog when she lays down but only when she is upright. She has not smoked significantly but may be 1/3 ppd x 2 years but has not smoked since 1978. She did have some second hand smoke exposure at home and worked as a . She denies vaping or drinking alcohol. She has moved from [place] in 2020 and misses being there. Pt's sister lives in [place] and her mother lived here but passed away in Dec 2023. She denies leg swelling, chest pain. She admits to snoring, fatigue and daytime sleepiness. She takes albuterol MDI prn from her  which helps her breathing. She has HST in Nov 2023 which was considered a poor technical study.     She is here for follow up. She states her breathing is doing good and feels better with albuterol inhaler. She denies cough or wheezing. She still is fatigued and has brain fog. She is due to see neurology. She canceled her sleep study. She is working with a physician in [place] to treat mold sensitivity. She is due to see cardiology and neurology this week. She has no new complaints and denies any worsening since the last visit.    Review of Systems   Respiratory:  Positive for shortness of breath.    Neurological:  Positive for dizziness.   All other systems reviewed and are negative.      Objective   Physical Exam  Vitals and  nursing note reviewed.   Constitutional:       Appearance: Normal appearance.   HENT:      Head: Normocephalic.      Nose: Nose normal.      Mouth/Throat:      Pharynx: Oropharynx is clear.   Eyes:      Extraocular Movements: Extraocular movements intact.      Conjunctiva/sclera: Conjunctivae normal.      Pupils: Pupils are equal, round, and reactive to light.   Cardiovascular:      Rate and Rhythm: Normal rate and regular rhythm.      Pulses: Normal pulses.      Heart sounds: Normal heart sounds.   Pulmonary:      Effort: Pulmonary effort is normal.      Breath sounds: Normal breath sounds.   Abdominal:      General: Bowel sounds are normal.      Palpations: Abdomen is soft.   Musculoskeletal:         General: Normal range of motion.      Cervical back: Normal range of motion.   Skin:     General: Skin is warm.   Neurological:      General: No focal deficit present.      Mental Status: She is alert and oriented to person, place, and time. Mental status is at baseline.   Psychiatric:         Mood and Affect: Mood normal.         Behavior: Behavior normal.       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   Iron deficiency  7.   Chronic allergic rhinitis to Dust mites and cat dander  8.   Orthostatic dizziness    Recommendations:  Patient's does not have hx of significant smoking but does have some hyperinflation on Chest Xray. We discussed differential of asthma vs ETS related COPD. She responds to albuterol MDI prn which I will continue. Discussed PFTs today and are c/w our D/D. I will start on Advair HFA from standpoint of possible asthma and assess her response. She does feel better with albuterol. In the long term, she may be ok with JUSTINA prn only.     -She has brain fog per chart review. On review of history it appears to be orthostatic dizziness as fog improves  when lays down too. Recommend work up with neurology and cardiology including a Tilt test.  -Continue iron supplementation  -May benefit with loratadine prn. She states she does not have significant symptoms currently  -Significant fatigue, EDS and has snoring. Mallamapti 3. HST with borderline reducing in O2 but was technically inadequate as belts did not stay on. Recommend an in-lab PSG asap.     She has appt with Dr. Funez. They will discuss Tilt test at that time. She has an appt on Nov 18th for neurology. She canceled sleep study for Nov 12th. Advised to reschedule sleep study.    Follow up in 6 months and if stable yearly.

## 2024-11-08 ENCOUNTER — APPOINTMENT (OUTPATIENT)
Dept: PRIMARY CARE | Facility: CLINIC | Age: 69
End: 2024-11-08
Payer: MEDICARE

## 2024-11-08 VITALS
BODY MASS INDEX: 27.64 KG/M2 | TEMPERATURE: 97.6 F | DIASTOLIC BLOOD PRESSURE: 82 MMHG | HEART RATE: 74 BPM | WEIGHT: 161 LBS | OXYGEN SATURATION: 98 % | SYSTOLIC BLOOD PRESSURE: 126 MMHG

## 2024-11-08 DIAGNOSIS — Z79.4 TYPE 2 DIABETES MELLITUS WITHOUT COMPLICATION, WITH LONG-TERM CURRENT USE OF INSULIN (MULTI): Chronic | ICD-10-CM

## 2024-11-08 DIAGNOSIS — E11.9 TYPE 2 DIABETES MELLITUS WITHOUT COMPLICATION, WITH LONG-TERM CURRENT USE OF INSULIN (MULTI): Chronic | ICD-10-CM

## 2024-11-08 DIAGNOSIS — E78.5 HYPERLIPIDEMIA, UNSPECIFIED HYPERLIPIDEMIA TYPE: Chronic | ICD-10-CM

## 2024-11-08 DIAGNOSIS — R41.89 BRAIN FOG: Primary | ICD-10-CM

## 2024-11-08 DIAGNOSIS — E89.0 POSTABLATIVE HYPOTHYROIDISM: ICD-10-CM

## 2024-11-08 DIAGNOSIS — G95.9 CERVICAL MYELOPATHY WITH CERVICAL RADICULOPATHY: ICD-10-CM

## 2024-11-08 DIAGNOSIS — R41.89 COGNITIVE CHANGES: ICD-10-CM

## 2024-11-08 DIAGNOSIS — I10 BENIGN ESSENTIAL HYPERTENSION: ICD-10-CM

## 2024-11-08 DIAGNOSIS — M54.12 CERVICAL MYELOPATHY WITH CERVICAL RADICULOPATHY: ICD-10-CM

## 2024-11-08 PROCEDURE — 99215 OFFICE O/P EST HI 40 MIN: CPT | Performed by: FAMILY MEDICINE

## 2024-11-08 PROCEDURE — 3044F HG A1C LEVEL LT 7.0%: CPT | Performed by: FAMILY MEDICINE

## 2024-11-08 PROCEDURE — 3079F DIAST BP 80-89 MM HG: CPT | Performed by: FAMILY MEDICINE

## 2024-11-08 PROCEDURE — 3074F SYST BP LT 130 MM HG: CPT | Performed by: FAMILY MEDICINE

## 2024-11-08 PROCEDURE — 3049F LDL-C 100-129 MG/DL: CPT | Performed by: FAMILY MEDICINE

## 2024-11-08 PROCEDURE — 1036F TOBACCO NON-USER: CPT | Performed by: FAMILY MEDICINE

## 2024-11-08 PROCEDURE — 4010F ACE/ARB THERAPY RXD/TAKEN: CPT | Performed by: FAMILY MEDICINE

## 2024-11-08 PROCEDURE — 1159F MED LIST DOCD IN RCRD: CPT | Performed by: FAMILY MEDICINE

## 2024-11-08 RX ORDER — LOSARTAN POTASSIUM 50 MG/1
50 TABLET ORAL DAILY
Qty: 90 TABLET | Refills: 1 | Status: SHIPPED | OUTPATIENT
Start: 2024-11-08

## 2024-11-08 RX ORDER — DESMOPRESSIN ACETATE 0.1 MG/1
100 TABLET ORAL NIGHTLY
COMMUNITY
Start: 2024-10-24 | End: 2024-11-08 | Stop reason: ALTCHOICE

## 2024-11-08 RX ORDER — LEVOTHYROXINE SODIUM 88 UG/1
88 TABLET ORAL DAILY
Start: 2024-11-08 | End: 2025-11-08

## 2024-11-08 RX ORDER — CHOLESTYRAMINE 4 G/9G
POWDER, FOR SUSPENSION ORAL
COMMUNITY
Start: 2024-11-05

## 2024-11-08 RX ORDER — VALACYCLOVIR HYDROCHLORIDE 500 MG/1
1 TABLET, FILM COATED ORAL
COMMUNITY
Start: 2024-10-24 | End: 2024-11-08 | Stop reason: ALTCHOICE

## 2024-11-08 ASSESSMENT — ENCOUNTER SYMPTOMS
UNEXPECTED WEIGHT CHANGE: 0
FEVER: 0
CHILLS: 0
FATIGUE: 1
SLEEP DISTURBANCE: 1
DYSPHORIC MOOD: 0
COUGH: 0

## 2024-11-08 NOTE — PROGRESS NOTES
Subjective   Patient ID: Юлия Ku is a 69 y.o. female who presents for Hyperlipidemia, Hypertension, and Hypothyroidism.    Юлия continues to have difficulty with brain fog.  She has been following with a mold specialist in [place] to try to resolve her symptoms.  She has tried multiple medications but has not received any relief.  She was recommended to have a sleep study but canceled it because she did not feel that this would help.  She also is scheduled for neurocognitive testing in a few months.    She was told by her endocrinologist that she was prescribed too much thyroid medicine so she stopped taking it instead of taking the reduced dose.  She has not been taking any thyroid medication for the last few weeks.         Review of Systems   Constitutional:  Positive for fatigue. Negative for chills, fever and unexpected weight change.   HENT:  Negative for congestion.    Respiratory:  Negative for cough.    Cardiovascular:  Negative for chest pain.   Psychiatric/Behavioral:  Positive for sleep disturbance. Negative for dysphoric mood.        Objective   /82   Pulse 74   Temp 36.4 °C (97.6 °F)   Wt 73 kg (161 lb)   SpO2 98%   BMI 27.64 kg/m²     Physical Exam  Constitutional:       General: She is not in acute distress.     Appearance: Normal appearance.   HENT:      Head: Normocephalic.      Mouth/Throat:      Mouth: Mucous membranes are moist.   Eyes:      Extraocular Movements: Extraocular movements intact.      Conjunctiva/sclera: Conjunctivae normal.   Cardiovascular:      Rate and Rhythm: Normal rate and regular rhythm.      Heart sounds: No murmur heard.  Pulmonary:      Breath sounds: No wheezing or rhonchi.   Musculoskeletal:      Cervical back: Neck supple.   Skin:     General: Skin is warm and dry.   Neurological:      Mental Status: She is alert.   Psychiatric:         Mood and Affect: Mood normal.         Speech: Speech is delayed.         Behavior: Behavior normal.          Assessment/Plan   Problem List Items Addressed This Visit             ICD-10-CM    Type 2 diabetes mellitus (Chronic) E11.9     Mgmt per endocrinology.          Relevant Orders    CBC and Auto Differential    Benign essential hypertension (Chronic) I10     Controlled.  Continue losartan.         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  Explained that she was on too high of a dose previously which is why her endocrinologist reduce the dose.  Recommend she resume the 88 mcg dose of Levoxyl that was recommended by her endocrinologist.         Relevant Medications    LevoxyL 88 mcg tablet    Cognitive changes R41.89     Patient scheduled for neurocognitive testing and follow-up with neurology.         Cervical myelopathy with cervical radiculopathy G95.9, M54.12    Brain fog - Primary R41.89     Brain fog is likely multifactorial.  Discussed with patient that is very poor and that she stay on her thyroid medicine.  As stopping the medication may be contributing to her brain fog.  Also suspect possible underlying sleep apnea.  Patient is going to reschedule her sleep test which she canceled.           Relevant Orders    Referral to [facility]     Time Based Billing:  - Prep Time on Date of Patient Encounter:  2 minutes  - Time Directly with Patient/Family/Caregiver:   29 minutes  - Documentation Time:   9 minutes    Total Minutes:  40

## 2024-11-09 NOTE — ASSESSMENT & PLAN NOTE
Discussed with patient that I would not recommend that she go off of her thyroid medicine.  Explained that she was on too high of a dose previously which is why her endocrinologist reduce the dose.  Recommend she resume the 88 mcg dose of Levoxyl that was recommended by her endocrinologist.

## 2024-11-09 NOTE — ASSESSMENT & PLAN NOTE
Brain fog is likely multifactorial.  Discussed with patient that is very poor and that she stay on her thyroid medicine.  As stopping the medication may be contributing to her brain fog.  Also suspect possible underlying sleep apnea.  Patient is going to reschedule her sleep test which she canceled.

## 2024-11-11 ENCOUNTER — APPOINTMENT (OUTPATIENT)
Dept: OTOLARYNGOLOGY | Facility: CLINIC | Age: 69
End: 2024-11-11
Payer: MEDICARE

## 2024-11-12 ENCOUNTER — OFFICE VISIT (OUTPATIENT)
Dept: CARDIOLOGY | Facility: HOSPITAL | Age: 69
End: 2024-11-12
Payer: MEDICARE

## 2024-11-12 ENCOUNTER — APPOINTMENT (OUTPATIENT)
Dept: SLEEP MEDICINE | Facility: CLINIC | Age: 69
End: 2024-11-12
Payer: MEDICARE

## 2024-11-12 VITALS
DIASTOLIC BLOOD PRESSURE: 65 MMHG | SYSTOLIC BLOOD PRESSURE: 120 MMHG | HEART RATE: 80 BPM | WEIGHT: 164.4 LBS | RESPIRATION RATE: 18 BRPM | BODY MASS INDEX: 28.22 KG/M2

## 2024-11-12 DIAGNOSIS — I10 BENIGN ESSENTIAL HYPERTENSION: Primary | Chronic | ICD-10-CM

## 2024-11-12 DIAGNOSIS — E78.5 HYPERLIPIDEMIA, UNSPECIFIED HYPERLIPIDEMIA TYPE: Chronic | ICD-10-CM

## 2024-11-12 PROBLEM — I51.9 RIGHT VENTRICULAR DYSFUNCTION: Status: RESOLVED | Noted: 2024-01-31 | Resolved: 2024-11-12

## 2024-11-12 PROCEDURE — 1126F AMNT PAIN NOTED NONE PRSNT: CPT | Performed by: INTERNAL MEDICINE

## 2024-11-12 PROCEDURE — 1036F TOBACCO NON-USER: CPT | Performed by: INTERNAL MEDICINE

## 2024-11-12 PROCEDURE — 3078F DIAST BP <80 MM HG: CPT | Performed by: INTERNAL MEDICINE

## 2024-11-12 PROCEDURE — 99213 OFFICE O/P EST LOW 20 MIN: CPT | Performed by: INTERNAL MEDICINE

## 2024-11-12 PROCEDURE — 1160F RVW MEDS BY RX/DR IN RCRD: CPT | Performed by: INTERNAL MEDICINE

## 2024-11-12 PROCEDURE — 3074F SYST BP LT 130 MM HG: CPT | Performed by: INTERNAL MEDICINE

## 2024-11-12 PROCEDURE — 1159F MED LIST DOCD IN RCRD: CPT | Performed by: INTERNAL MEDICINE

## 2024-11-12 PROCEDURE — 4010F ACE/ARB THERAPY RXD/TAKEN: CPT | Performed by: INTERNAL MEDICINE

## 2024-11-12 PROCEDURE — 3049F LDL-C 100-129 MG/DL: CPT | Performed by: INTERNAL MEDICINE

## 2024-11-12 PROCEDURE — 93005 ELECTROCARDIOGRAM TRACING: CPT | Performed by: INTERNAL MEDICINE

## 2024-11-12 PROCEDURE — 3044F HG A1C LEVEL LT 7.0%: CPT | Performed by: INTERNAL MEDICINE

## 2024-11-12 PROCEDURE — 93010 ELECTROCARDIOGRAM REPORT: CPT | Performed by: INTERNAL MEDICINE

## 2024-11-12 ASSESSMENT — COLUMBIA-SUICIDE SEVERITY RATING SCALE - C-SSRS: 1. IN THE PAST MONTH, HAVE YOU WISHED YOU WERE DEAD OR WISHED YOU COULD GO TO SLEEP AND NOT WAKE UP?: NO

## 2024-11-12 ASSESSMENT — PAIN SCALES - GENERAL: PAINLEVEL_OUTOF10: 0-NO PAIN

## 2024-11-12 NOTE — PROGRESS NOTES
Chief Complaint:   3 Month Follow up     History Of Present Illness:    Юлия Ku is a 69 y.o. female presenting for follow-up.    She has a past medical history of type 2 diabetes mellitus, hypertension, hyperthyroidism, status post radioactive iodine thyroid ablation, who started having ankle swelling, jitteriness, shortness of breath after Covid infection in January 2021. She just did not feel right and went to see her endocrinologist. Both her diabetes and thyroid was out of control. Her TSH was suppressed and her hemoglobin A1c was elevated. She was started on Lantus and Trulicity with improvement of sugar levels. Her thyroid medications were cut back. She had an echo done which showed normal LV function normal diastolic filling but mild to moderate RV dysfunction. She tells me she has history of snoring but no daytime somnolence or history of apnea at night. She does not have chest pain, palpitations, lightheadedness loss of consciousness. Family history quite unremarkable.        ECG shows sinus rhythm, low voltage precordial leads. We performed a exercise stress MPI. Exercise tolerance was poor. There were EKG changes without chest pain. Subsequent MPI with apical ischemia with normal LV function. She then underwent a right and left heart catheterization in 2021 that showed normal coronaries normal hemodynamics normal filling pressure and no evidence of shunt.     Recently she came in to see us for multiple issues e.g. brain fog, and balance problems. She would have a shuffling gait whenever she tries to turn and come close to falling. She sometimes gets the room spinning around when she moves her head in 1 direction. She tells me she had an extensive work-up including MRI brain MRI neck by a neurologist at [facility] who did not find any neurologic diagnosis or neurologic cause for her symptoms. Also her TSH is elevated although free T4 was normal.  We performed orthostatic vitals and it was  positive.  Therefore we held her losartan that did not help symptoms and she tried midodrine but that also did not help her symptoms and in fact her blood pressure went up to 200.  At that point she stopped it.  She had a 24-hour Holter monitor that did not show any arrhythmia.  All this was in 2023.  Finally she went for a second opinion at a long COVID clinic and was told she has long COVID.  She has been diagnosed with immunodeficiency and getting IgG infusions.  She is also seeing a naturopath.  She has been taken off her statins by her naturopath because of these above symptoms.   .     Last Recorded Vitals:  Vitals:    11/12/24 1503   BP: 120/65   BP Location: Left arm   Patient Position: Sitting   BP Cuff Size: Adult   Pulse: 80   Resp: 18   Weight: 74.6 kg (164 lb 6.4 oz)       Past Medical History:  She has a past medical history of Disease of thyroid gland (graves 2008), Fatigue (12/2023), Hypertension (2008), Personal history of irradiation, Personal history of other diseases of the circulatory system, Personal history of other endocrine, nutritional and metabolic disease, Personal history of other endocrine, nutritional and metabolic disease, Personal history of other endocrine, nutritional and metabolic disease, and Personal history of other endocrine, nutritional and metabolic disease.    Past Surgical History:  She has a past surgical history that includes Other surgical history (01/12/2021); Breast biopsy (5/2008); and Cardiac catheterization.      Social History:  She reports that she quit smoking about 46 years ago. Her smoking use included cigarettes. She started smoking about 48 years ago. She has a 0.5 pack-year smoking history. She has never used smokeless tobacco. She reports that she does not drink alcohol and does not use drugs.    Family History:  Family History   Problem Relation Name Age of Onset    Alzheimer's disease Mother Sandeep Lambert     Hypertension Mother Sandeep Lambert     Cancer Mother  Sandeep Lambert     Breast cancer Mother Sandeep Lambert     Heart disease Maternal Grandfather      Hypertension Father Chichi Lambert     Kidney disease Father Chichi Lambert         Allergies:  Mold, Codeine, and Lisinopril    Outpatient Medications:  Current Outpatient Medications   Medication Instructions    carbidopa-levodopa (Sinemet)  mg tablet 0.5 tablets    cholecalciferol, vitD3,/vit K2 (vitamin D3-vitamin K2) 250 mcg (10,000 unit)-45 mcg capsule Take by mouth.    cholestyramine (Questran) 4 gram powder MIX 4 GRAMS INTO LIQUID AND DRINK TWICE DAILY AS DIRECTED    cycloSPORINE (Restasis) 0.05 % ophthalmic emulsion 1 drop, Every 12 hours    EPINEPHrine 0.3 mg/0.3 mL injection syringe 1 Syringe, Once as needed    fluticasone propion-salmeteroL (Advair HFA) 230-21 mcg/actuation inhaler 2 puffs, inhalation, 2 times daily RT, Rinse mouth with water after use to reduce aftertaste and incidence of candidiasis. Do not swallow.    FreeStyle Srinivas 3 Sensor device USE AS DIRECTED TO CHECK BLOOD GLUCOSE. CHANGE EVERY 14 DAYS    immune globulin-hyaluronidase (Hyqvia) 2.5 gram /25 mL (10 %) solution Once    LevoxyL 88 mcg, oral, Daily, Take on an empty stomach at the same time each day, either 30 to 60 minutes prior to breakfast    losartan (COZAAR) 50 mg, oral, Daily    metFORMIN  mg 24 hr tablet 2 tablets every morning and one tablet every evening    NON FORMULARY Glia Plasmalogens    NON FORMULARY 2 each, Daily    omega-3 fatty acids (SUPER OMEGA-3 ORAL) Take by mouth.    pilocarpine (Salagen) 5 mg tablet TAKE 1 TABLET(5 MG) BY MOUTH THREE TIMES DAILY    PreviDent 5000 Dry Mouth 1.1 % dental paste Daily       Physical Exam:  Physical Exam  Vitals reviewed.   Constitutional:       Appearance: Normal appearance.   Neck:      Vascular: No carotid bruit or JVD.   Cardiovascular:      Rate and Rhythm: Normal rate and regular rhythm.      Pulses: Normal pulses.      Heart sounds: Normal heart sounds, S1 normal and S2 normal.    Pulmonary:      Effort: Pulmonary effort is normal. No respiratory distress.      Breath sounds: No wheezing or rales.   Abdominal:      General: Abdomen is flat.      Palpations: Abdomen is soft.   Musculoskeletal:      Right lower leg: No edema.      Left lower leg: No edema.   Skin:     General: Skin is warm.   Neurological:      Mental Status: She is alert and oriented to person, place, and time. Mental status is at baseline.   Psychiatric:         Mood and Affect: Mood normal.         Behavior: Behavior normal.           Last Labs:  CBC -  Lab Results   Component Value Date    WBC 4.2 (L) 09/26/2024    HGB 15.0 09/26/2024    HCT 44.1 09/26/2024    MCV 94 09/26/2024     09/26/2024       CMP -  Lab Results   Component Value Date    CALCIUM 9.5 04/09/2024    PROT 6.4 04/09/2024    ALBUMIN 4.3 04/09/2024    AST 13 04/09/2024    ALT 6 (L) 04/09/2024    ALKPHOS 46 04/09/2024    BILITOT 0.7 04/09/2024       LIPID PANEL -   Lab Results   Component Value Date    CHOL 224 (H) 04/09/2024    TRIG 255 (H) 04/09/2024    HDL 55.7 04/09/2024    CHHDL 4.0 04/09/2024    LDLF 47 04/03/2023    VLDL 51 (H) 04/09/2024    NHDL 168 (H) 04/09/2024       RENAL FUNCTION PANEL -   Lab Results   Component Value Date    GLUCOSE 114 (H) 04/09/2024     04/09/2024    K 4.4 04/09/2024     04/09/2024    CO2 25 04/09/2024    ANIONGAP 13 04/09/2024    BUN 19 04/09/2024    CREATININE 0.82 04/09/2024    CALCIUM 9.5 04/09/2024    ALBUMIN 4.3 04/09/2024        Lab Results   Component Value Date    BNP 53 06/22/2021    HGBA1C 6.5 (H) 09/26/2024       Last Cardiology Tests:  ECG:  ECG 12 lead (Clinic Performed) 11/14/2023      Echo:  Transthoracic Echo Complete 10/16/2024      Ejection Fractions:  EF   Date/Time Value Ref Range Status   10/16/2024 11:18 AM 56 %        Cath:  No results found for this or any previous visit from the past 1095 days.      Stress Test:  No results found for this or any previous visit from the past 1095  days.      Cardiac Imaging:  No results found for this or any previous visit from the past 1095 days.          Assessment/Plan   Benign essential hypertension -She had RV dysfunction without pulmonary hypertension. Cause unclear.  I suspect this was a combination of hyperthyroid state, uncontrolled blood pressure, recent Covid infection.   Her ischemic evaluation and right heart cath was negative.  Reviewed echo with normal RV function.      2.  Striae of diabetes mellitus-I attempted to intensify statins but does not tolerate this.     3-Orthostatic hypotension- She had mild orthostatic hypotension but her symptoms did not improve with holding losartan or with midodrine.  At this time she has a diagnosis of long COVID.  Advised to follow-up with PCP.    We will reevaluate her as needed in future.            Sal Funez MD

## 2024-11-13 ENCOUNTER — PATIENT MESSAGE (OUTPATIENT)
Dept: PRIMARY CARE | Facility: CLINIC | Age: 69
End: 2024-11-13

## 2024-11-13 ENCOUNTER — APPOINTMENT (OUTPATIENT)
Dept: ENDOCRINOLOGY | Facility: CLINIC | Age: 69
End: 2024-11-13
Payer: MEDICARE

## 2024-11-13 DIAGNOSIS — E03.9 HYPOTHYROIDISM, UNSPECIFIED TYPE: ICD-10-CM

## 2024-11-13 PROCEDURE — 4010F ACE/ARB THERAPY RXD/TAKEN: CPT | Performed by: INTERNAL MEDICINE

## 2024-11-13 PROCEDURE — 99213 OFFICE O/P EST LOW 20 MIN: CPT | Performed by: INTERNAL MEDICINE

## 2024-11-13 PROCEDURE — 3049F LDL-C 100-129 MG/DL: CPT | Performed by: INTERNAL MEDICINE

## 2024-11-13 PROCEDURE — 3044F HG A1C LEVEL LT 7.0%: CPT | Performed by: INTERNAL MEDICINE

## 2024-11-13 NOTE — PROGRESS NOTES
History Of Present Illness  Юлия Ku is a 69 y.o. female with a postablative hypothyroidism  She has been on levothyroxine since 2009  Graves' disease, 131-iodine therapy in 2008     Brain fog persists.     Levothyroxine decreased from 100 to 88 mcg/day.  Prescribed Levoxyl (MERLE) at patient's request, but she never received it.  Dr. Dupree again prescribed Levoxyl 88 mcg 5 days ago.  Pharmacy told her they do not have it and to try another pharmacy.  She states she has been taking generic levothyroxine 88 mcg/day for the past 3 weeks.       Past Medical History  She has a past medical history of Disease of thyroid gland (graves 2008), Fatigue (12/2023), Hypertension (2008), Personal history of irradiation, Personal history of other diseases of the circulatory system, Personal history of other endocrine, nutritional and metabolic disease, Personal history of other endocrine, nutritional and metabolic disease, Personal history of other endocrine, nutritional and metabolic disease, Personal history of other endocrine, nutritional and metabolic disease, and Right ventricular dysfunction (01/31/2024).    Surgical History  She has a past surgical history that includes Other surgical history (01/12/2021); Breast biopsy (5/2008); and Cardiac catheterization.     Social History  She reports that she quit smoking about 46 years ago. Her smoking use included cigarettes. She started smoking about 48 years ago. She has a 0.5 pack-year smoking history. She has never used smokeless tobacco. She reports that she does not drink alcohol and does not use drugs.    Family History  Family History   Problem Relation Name Age of Onset    Alzheimer's disease Mother Sandeep Labmert     Hypertension Mother Sandeep Lambert     Cancer Mother Sandeep Lambert     Breast cancer Mother Sandeep Lambert     Heart disease Maternal Grandfather      Hypertension Father Chichi Sanderskerrie     Kidney disease Father Chichi Sanderskerrie        Medications  Current Outpatient  Medications   Medication Instructions    carbidopa-levodopa (Sinemet)  mg tablet 0.5 tablets    cholecalciferol, vitD3,/vit K2 (vitamin D3-vitamin K2) 250 mcg (10,000 unit)-45 mcg capsule Take by mouth.    cholestyramine (Questran) 4 gram powder MIX 4 GRAMS INTO LIQUID AND DRINK TWICE DAILY AS DIRECTED    cycloSPORINE (Restasis) 0.05 % ophthalmic emulsion 1 drop, Every 12 hours    EPINEPHrine 0.3 mg/0.3 mL injection syringe 1 Syringe, Once as needed    fluticasone propion-salmeteroL (Advair HFA) 230-21 mcg/actuation inhaler 2 puffs, inhalation, 2 times daily RT, Rinse mouth with water after use to reduce aftertaste and incidence of candidiasis. Do not swallow.    FreeStyle Srinivas 3 Sensor device USE AS DIRECTED TO CHECK BLOOD GLUCOSE. CHANGE EVERY 14 DAYS    immune globulin-hyaluronidase (Hyqvia) 2.5 gram /25 mL (10 %) solution Once    LevoxyL 88 mcg, oral, Daily, Take on an empty stomach at the same time each day, either 30 to 60 minutes prior to breakfast    losartan (COZAAR) 50 mg, oral, Daily    metFORMIN  mg 24 hr tablet 2 tablets every morning and one tablet every evening    NON FORMULARY Glia Plasmalogens    NON FORMULARY 2 each, Daily    omega-3 fatty acids (SUPER OMEGA-3 ORAL) Take by mouth.    pilocarpine (Salagen) 5 mg tablet TAKE 1 TABLET(5 MG) BY MOUTH THREE TIMES DAILY    PreviDent 5000 Dry Mouth 1.1 % dental paste Daily       Allergies  Mold, Codeine, and Lisinopril      Last Recorded Vitals  There were no vitals taken for this visit.    Physical Exam  Constitutional:       General: She is not in acute distress.  Neurological:      Mental Status: She is alert.          Relevant Results  Lab Results   Component Value Date    TSH 0.09 (L) 09/26/2024    FREET4 1.69 (H) 09/26/2024    THYROIDPAB 75 (H) 09/26/2024         IMPRESSION  HYPOTHYROIDISM, POSTABLATIVE  Recent TSH suppressed on Levothyroxine 112 mcg/day  Changed to Levoxyl 88 mcg, but she states she has generic levothyroxine 88  mcg/day       RECOMMENDATIONS  Levothyroxine 88 mcg daily  I will change to Levoxyl if she locates a pharmacy that supplies it.      Repeat TSH at the end of this month  Results available on [employer]  Call/message if you have not received results in 3 days.

## 2024-11-19 ENCOUNTER — APPOINTMENT (OUTPATIENT)
Dept: OTOLARYNGOLOGY | Facility: CLINIC | Age: 69
End: 2024-11-19
Payer: MEDICARE

## 2024-11-21 ENCOUNTER — APPOINTMENT (OUTPATIENT)
Dept: CARDIOLOGY | Facility: HOSPITAL | Age: 69
End: 2024-11-21
Payer: MEDICARE

## 2024-11-29 ENCOUNTER — TELEPHONE (OUTPATIENT)
Dept: SLEEP MEDICINE | Facility: CLINIC | Age: 69
End: 2024-11-29
Payer: MEDICARE

## 2024-11-29 NOTE — TELEPHONE ENCOUNTER
Patient  called Sleep Center claiming that no one ever returned his wife's calls. (See notes in EPIC)  then physically came to sleep Center to schedule wife's appointment.  was confrontational with Lead Tech when told he would have to wait until I got back in to the office from bereavement. When pointed out that wife was given opportunity to re-sched and she was the one who hac cx'd,  stated that wife had not been feeling well. I called pt at [phone] and lmom for return call. I will call again before I leave office today.  sfl

## 2024-12-03 ENCOUNTER — APPOINTMENT (OUTPATIENT)
Dept: OTOLARYNGOLOGY | Facility: CLINIC | Age: 69
End: 2024-12-03
Payer: MEDICARE

## 2024-12-04 ENCOUNTER — TELEPHONE (OUTPATIENT)
Dept: SLEEP MEDICINE | Facility: CLINIC | Age: 69
End: 2024-12-04
Payer: MEDICARE

## 2024-12-07 ENCOUNTER — PROCEDURE VISIT (OUTPATIENT)
Dept: SLEEP MEDICINE | Facility: CLINIC | Age: 69
End: 2024-12-07
Payer: MEDICARE

## 2024-12-07 VITALS
DIASTOLIC BLOOD PRESSURE: 65 MMHG | BODY MASS INDEX: 28 KG/M2 | HEIGHT: 64 IN | SYSTOLIC BLOOD PRESSURE: 120 MMHG | WEIGHT: 163.98 LBS

## 2024-12-07 DIAGNOSIS — G47.10 HYPERSOMNIA WITH SLEEP APNEA: ICD-10-CM

## 2024-12-07 DIAGNOSIS — G47.30 HYPERSOMNIA WITH SLEEP APNEA: ICD-10-CM

## 2024-12-07 ASSESSMENT — SLEEP AND FATIGUE QUESTIONNAIRES
HOW LIKELY ARE YOU TO NOD OFF OR FALL ASLEEP WHILE WATCHING TV: SLIGHT CHANCE OF DOZING
HOW LIKELY ARE YOU TO NOD OFF OR FALL ASLEEP WHILE LYING DOWN TO REST IN THE AFTERNOON WHEN CIRCUMSTANCES PERMIT: SLIGHT CHANCE OF DOZING
HOW LIKELY ARE YOU TO NOD OFF OR FALL ASLEEP WHILE SITTING AND READING: WOULD NEVER DOZE
HOW LIKELY ARE YOU TO NOD OFF OR FALL ASLEEP IN A CAR, WHILE STOPPED FOR A FEW MINUTES IN TRAFFIC: WOULD NEVER DOZE
HOW LIKELY ARE YOU TO NOD OFF OR FALL ASLEEP WHEN YOU ARE A PASSENGER IN A CAR FOR AN HOUR WITHOUT A BREAK: SLIGHT CHANCE OF DOZING
HOW LIKELY ARE YOU TO NOD OFF OR FALL ASLEEP WHILE SITTING AND TALKING TO SOMEONE: WOULD NEVER DOZE
ESS-CHAD TOTAL SCORE: 4
HOW LIKELY ARE YOU TO NOD OFF OR FALL ASLEEP WHILE SITTING QUIETLY AFTER LUNCH WITHOUT ALCOHOL: SLIGHT CHANCE OF DOZING
SITING INACTIVE IN A PUBLIC PLACE LIKE A CLASS ROOM OR A MOVIE THEATER: WOULD NEVER DOZE

## 2024-12-08 NOTE — PROGRESS NOTES
[facility] TECH NOTE:     Patient: Юлия Ku   MRN//AGE: [id number]  1955  69 y.o.   Technologist: Jacque Amezcua   Room: [facility]   Service Date: 2024        Sleep Testing Location: [facility]    [place]: 4    TECHNOLOGIST SLEEP STUDY PROCEDURE NOTE:   This sleep study is being conducted according to the policies and procedures outlined by the AAS accreditation standards.  The sleep study procedure and processes involved during this appointment was explained to the patient/patient’s family, questions were answered. The patient/family verbalized understanding.      The patient is a 69  year old female scheduled for aDiagnostic PSG Split night with montage of:  standard sleep montage . she arrived for her appointment.      The study that was ultimately completed was aDiagnostic PSG Split night with montage of:  standard sleep montage .    The full study Was completed.  Patient questionnaires completed?: yes     Consents signed? yes    Initial Fall Risk Screening:     Юлия has fallen in the last 6 months. her did not result in injury. Юлия does have a fear of falling. He does need assistance with sitting, standing, or walking. she does need assistance walking in her home. she does need assistance in an unfamiliar setting. The patient isusing an assistive device.     Brief Study observations: 69 yr old female presents for a diagnostic PSG. Pt was accompanied by her  to the sleep center. The pt was using a cane and had assistance walking from her . The pt was given a walker to use in her room and was told to call the tech for assistance if  she needed to get out of bed. The tech did assist pt to the bathroom.      Deviation to order/protocol and reason: NA      If PAP, which was preferred mask/pressure/mode: NA      Other:None    After the procedure, the patient/family was informed to ensure followup with ordering clinician for testing results.      Technologist: Jacque Amezcua

## 2024-12-19 ENCOUNTER — APPOINTMENT (OUTPATIENT)
Dept: ALLERGY | Facility: CLINIC | Age: 69
End: 2024-12-19
Payer: MEDICARE

## 2024-12-19 ENCOUNTER — LAB (OUTPATIENT)
Dept: LAB | Facility: LAB | Age: 69
End: 2024-12-19
Payer: MEDICARE

## 2024-12-19 VITALS
HEART RATE: 95 BPM | SYSTOLIC BLOOD PRESSURE: 126 MMHG | OXYGEN SATURATION: 95 % | WEIGHT: 163 LBS | DIASTOLIC BLOOD PRESSURE: 80 MMHG | BODY MASS INDEX: 27.83 KG/M2 | RESPIRATION RATE: 18 BRPM | TEMPERATURE: 98 F | HEIGHT: 64 IN

## 2024-12-19 DIAGNOSIS — D83.9 CVID (COMMON VARIABLE IMMUNODEFICIENCY): ICD-10-CM

## 2024-12-19 DIAGNOSIS — D80.1 HYPOGAMMAGLOBULINEMIA (MULTI): Primary | ICD-10-CM

## 2024-12-19 DIAGNOSIS — D80.1 HYPOGAMMAGLOBULINEMIA (MULTI): ICD-10-CM

## 2024-12-19 DIAGNOSIS — E03.8 OTHER SPECIFIED HYPOTHYROIDISM: ICD-10-CM

## 2024-12-19 DIAGNOSIS — G20.A1 PARKINSON DISEASE, SYMPTOMATIC (MULTI): ICD-10-CM

## 2024-12-19 PROCEDURE — 82784 ASSAY IGA/IGD/IGG/IGM EACH: CPT

## 2024-12-19 PROCEDURE — 4010F ACE/ARB THERAPY RXD/TAKEN: CPT | Performed by: ALLERGY & IMMUNOLOGY

## 2024-12-19 PROCEDURE — 3074F SYST BP LT 130 MM HG: CPT | Performed by: ALLERGY & IMMUNOLOGY

## 2024-12-19 PROCEDURE — 3008F BODY MASS INDEX DOCD: CPT | Performed by: ALLERGY & IMMUNOLOGY

## 2024-12-19 PROCEDURE — 99214 OFFICE O/P EST MOD 30 MIN: CPT | Performed by: ALLERGY & IMMUNOLOGY

## 2024-12-19 PROCEDURE — 36415 COLL VENOUS BLD VENIPUNCTURE: CPT

## 2024-12-19 PROCEDURE — 3049F LDL-C 100-129 MG/DL: CPT | Performed by: ALLERGY & IMMUNOLOGY

## 2024-12-19 PROCEDURE — 3044F HG A1C LEVEL LT 7.0%: CPT | Performed by: ALLERGY & IMMUNOLOGY

## 2024-12-19 PROCEDURE — 3079F DIAST BP 80-89 MM HG: CPT | Performed by: ALLERGY & IMMUNOLOGY

## 2024-12-19 PROCEDURE — 1159F MED LIST DOCD IN RCRD: CPT | Performed by: ALLERGY & IMMUNOLOGY

## 2024-12-19 NOTE — PROGRESS NOTES
Patient ID: Юлия Ku is a 69 y.o. female.     Chief Complaint: follow up  History Of Present Illness  Юлия Ku is a 69 y.o. female with PMx hypogammaglobulinemia presenting for follow up.   No infections.  The brain fog has not changed.  Using Zyrtec 5 mg-makes her too tired so using as needed  Infusing now at night and doing her dosing every other week because she is so tired from the infusion.  She does have hypothyroid.     History of chronic sclerosing alodentitis.  Did biopsy to confirm.    Food Allergy  No    Eczema/ Atopic Dermatitis  No    Asthma  No    Rhinoconjunctivitis  Dust mite and cat allergy    Drug Allergy   Reviewed in chart    Insect Allergy   No    Infections  No history of frequent or recurrent infections      Review of Systems    Pertinent positives and negatives have been assessed in the HPI. All other systems have been reviewed and are negative except as noted in the HPI.    Allergies  Mold, Codeine, and Lisinopril    Past Medical History  She has a past medical history of Disease of thyroid gland (graves 2008), Fatigue (12/2023), Hypertension (2008), Personal history of irradiation, Personal history of other diseases of the circulatory system, Personal history of other endocrine, nutritional and metabolic disease, Personal history of other endocrine, nutritional and metabolic disease, Personal history of other endocrine, nutritional and metabolic disease, Personal history of other endocrine, nutritional and metabolic disease, and Right ventricular dysfunction (01/31/2024).    Family History  Family History   Problem Relation Name Age of Onset    Alzheimer's disease Mother Sandeep Youngs     Hypertension Mother Sandeep Youngs     Cancer Mother Sandeep Lambert     Breast cancer Mother Sandeep Lambert     Heart disease Maternal Grandfather      Hypertension Father Chichi Lambert     Kidney disease Father Chichi Lambert          Surgical History  She has a past surgical history that includes Other  surgical history (01/12/2021); Breast biopsy (5/2008); and Cardiac catheterization.    Social/Environmental History  She reports that she quit smoking about 46 years ago. Her smoking use included cigarettes. She started smoking about 48 years ago. She has a 0.5 pack-year smoking history. She has never used smokeless tobacco. She reports that she does not drink alcohol and does not use drugs.      MEDICATIONS  Current Outpatient Medications on File Prior to Visit   Medication Sig Dispense Refill    carbidopa-levodopa (Sinemet)  mg tablet Take 0.5 tablets by mouth.      cholecalciferol, vitD3,/vit K2 (vitamin D3-vitamin K2) 250 mcg (10,000 unit)-45 mcg capsule Take by mouth.      cycloSPORINE (Restasis) 0.05 % ophthalmic emulsion Administer 1 drop into both eyes every 12 hours.      EPINEPHrine 0.3 mg/0.3 mL injection syringe Inject 0.3 mL (0.3 mg) into the muscle 1 time if needed.      fluticasone propion-salmeteroL (Advair HFA) 230-21 mcg/actuation inhaler Inhale 2 puffs 2 times a day. Rinse mouth with water after use to reduce aftertaste and incidence of candidiasis. Do not swallow. 12 g 11    FreeStyle Srinivas 3 Sensor device USE AS DIRECTED TO CHECK BLOOD GLUCOSE. CHANGE EVERY 14 DAYS      immun glob G,IgG,/pro/IgA 0-50 (HIZENTRA SUBQ) Inject 10 g under the skin every 14 (fourteen) days.      LevoxyL 88 mcg tablet Take 1 tablet (88 mcg) by mouth early in the morning.. Take on an empty stomach at the same time each day, either 30 to 60 minutes prior to breakfast      losartan (Cozaar) 50 mg tablet Take 1 tablet (50 mg) by mouth once daily. 90 tablet 1    metFORMIN  mg 24 hr tablet 2 tablets every morning and one tablet every evening 270 tablet 3    NON FORMULARY Glia Plasmalogens      NON FORMULARY Take 2 each by mouth once daily. Ferrasorb      omega-3 fatty acids (SUPER OMEGA-3 ORAL) Take by mouth.      pilocarpine (Salagen) 5 mg tablet TAKE 1 TABLET(5 MG) BY MOUTH THREE TIMES DAILY 270 tablet 1     "PreviDent 5000 Dry Mouth 1.1 % dental paste Apply to teeth once daily.      cholestyramine (Questran) 4 gram powder MIX 4 GRAMS INTO LIQUID AND DRINK TWICE DAILY AS DIRECTED      [DISCONTINUED] immune globulin-hyaluronidase (Hyqvia) 2.5 gram /25 mL (10 %) solution Infuse into a venous catheter 1 time. (Patient not taking: Reported on 12/19/2024)       No current facility-administered medications on file prior to visit.         Physical Exam  Visit Vitals  /80   Pulse 95   Temp 36.7 °C (98 °F) (Temporal)   Resp 18   Ht 1.626 m (5' 4\")   Wt 73.9 kg (163 lb)   SpO2 95%   BMI 27.98 kg/m²   OB Status Postmenopausal   Smoking Status Former   BSA 1.83 m²       Wt Readings from Last 1 Encounters:   12/19/24 73.9 kg (163 lb)       Physical Exam    General: Well appearing, no acute distress  Head: Normocephalic, atraumatic, neck supple without lymphadenopathy  Eyes:  non-injected  Nose: No nasal crease, nares patent, minimal discharge  Throat: no erythema  Heart: Regular rate and rhythm  Lungs: Clear to auscultation bilaterally, effort normal  Abdomen: Soft, non-tender, normal bowel sounds  Extremities: Unsteady gate, no edema  Skin: No rashes/lesions  Psych: normal mood and affect    LAB RESULTS:  CBC:  Recent Labs     09/26/24  1020 04/09/24  1250 09/12/23  1312   WBC 4.2* 7.0 5.8   HGB 15.0 15.3 13.5   HCT 44.1 46.0 42.3    308 320   MCV 94 98 93   EOSABS 0.12 0.08 0.06       CMP:  Recent Labs     04/09/24  1250 04/03/23  1131 11/03/21  1600    139 141   K 4.4 4.3 4.5    103 104   CO2 25 29 29   ANIONGAP 13 11 13   BUN 19 13 17   CREATININE 0.82 0.76 0.86   EGFR 79  --   --      Recent Labs     04/09/24  1250 04/03/23  1131 08/14/21  1048   ALBUMIN 4.3 4.6 4.2   ALKPHOS 46 43 48   ALT 6* 11 25   AST 13 13 19   BILITOT 0.7 0.8 0.6       ALLERGY:   Lab Results   Component Value Date    ICIGE 44.4 06/02/2023    WHITEASH 0.13 06/02/2023    SILVERBIRCH <0.10 06/02/2023    BOXELDER 0.11 06/02/2023    " MOUNTJUNIPER <0.10 06/02/2023    COTTONWOOD <0.10 06/02/2023    ELM <0.10 06/02/2023    MULBERRY <0.10 06/02/2023    PECANHICKORY <0.10 06/02/2023    MAPLESYCAMOR <0.10 06/02/2023    OAK <0.10 06/02/2023    BERMUDAGR <0.10 06/02/2023    JOHNSONGR <0.10 06/02/2023    BLUEGRASS 0.12 06/02/2023    TIMOTHYGRASS <0.10 06/02/2023     Lab Results   Component Value Date    LAMBQUART <0.10 06/02/2023    PIGWEED <0.10 06/02/2023    COMRAGWEED <0.10 06/02/2023    SHEEPSOR <0.10 06/02/2023    PLANTAIN <0.10 06/02/2023    CATEPI 0.59 (A) 06/02/2023    DOGEPI 0.11 06/02/2023    ALTERNA <0.10 06/02/2023    CLADHERB <0.10 06/02/2023    ICA04 <0.10 06/02/2023    DERMFAR 1.50 (A) 06/02/2023    DERMPTE 0.92 (A) 06/02/2023    COCKR <0.10 06/02/2023       Recent Labs     06/02/23  1228   ICIGE 44.4     Recent Labs     09/26/24  1020 04/09/24  1250 09/12/23  1312   EOSABS 0.12 0.08 0.06     Recent Labs     06/02/23  1228   TRYPTASE 2.4       IMMUNO:   Recent Labs     06/17/24  1414 10/30/23  0922 06/02/23  1228    501* 469*  372*  105*  19  46   IGA 72 51*  --    IGM 47 41  --      HEME/ENDO:  Recent Labs     09/26/24  1020 07/24/24  1422 04/09/24  1250 01/09/24  1000 09/12/23  1312 07/25/23  1356 06/15/23  1312   TSH 0.09* 0.03* 7.69*  --   --    < >  --    HGBA1C 6.5*  --   --  6.2*  --   --  6.2*   FERRITIN  --   --   --   --  19  --   --    IRONSAT  --   --   --   --  9*  --   --     < > = values in this interval not displayed.         Assessment/Plan   Юлия is a 68 yo with Parkinson's and CVID.  She has hypothyroid.  She complains of fatigue and brain fog.  -for now she will continue subcutaneous infusions, but we discussed reassessing in the spring.  -Obtain immune lab levels and I will call results  Follow up 4-5 months      Fanny Tian,

## 2024-12-20 ENCOUNTER — TELEPHONE (OUTPATIENT)
Dept: PULMONOLOGY | Facility: CLINIC | Age: 69
End: 2024-12-20
Payer: MEDICARE

## 2024-12-20 ENCOUNTER — TELEPHONE (OUTPATIENT)
Dept: PULMONOLOGY | Facility: HOSPITAL | Age: 69
End: 2024-12-20
Payer: MEDICARE

## 2024-12-20 LAB
IGA SERPL-MCNC: 58 MG/DL (ref 70–400)
IGG SERPL-MCNC: 736 MG/DL (ref 700–1600)
IGM SERPL-MCNC: 43 MG/DL (ref 40–230)

## 2024-12-20 NOTE — TELEPHONE ENCOUNTER
Called and spoke with patient and let her know that Dr. Lundberg recommended OTC Melatonin. Instructed her to call us back with any further questions or concerns. Patient was agreeable to treatment plan and acknowledged understanding.     ----- Message from Wallace Lundberg sent at 12/20/2024 10:00 AM EST -----  Regarding: RE: Sleep Aide  I don’t prefer a sleep medication except over the counter Melatonin 3 mg at bedtime as needed is acceptable in the short term  ----- Message -----  From: Charis Fountain MA  Sent: 12/20/2024   9:48 AM EST  To: Wallace Lundberg MD  Subject: Sleep Aide                                       I called patient regarding results of sleep study and patients  asked if you would be willing to prescribe her a sleep medication to help her stay sleeping.

## 2024-12-20 NOTE — TELEPHONE ENCOUNTER
Called and spoke with patient and let her know the results of her sleep study. Instructed her to call us back with any further questions or concerns. Patient was agreeable to treatment plan and acknowledged understanding.     ----- Message from Wallace Lundberg sent at 12/19/2024 12:44 PM EST -----  Please advise patient that sleep study showed mild SIMBA but not significant by medicare criteria to consider PAP therapy. Continue to manage weight, sleep on her side if possible and report if any worsening of symptoms. Please advise patient that her 'brain fog' is not secondary to sleep apnea issues. She does have Periodic Limb Movements that can interrupt her sleep. I will discuss more about the further options for management including checking Ferritin Levels on follow up. She should complete all other testing as ordered and keep follow up appt.

## 2024-12-26 ENCOUNTER — APPOINTMENT (OUTPATIENT)
Dept: CARDIOLOGY | Facility: HOSPITAL | Age: 69
End: 2024-12-26
Payer: MEDICARE

## 2024-12-30 ENCOUNTER — PATIENT MESSAGE (OUTPATIENT)
Dept: PRIMARY CARE | Facility: CLINIC | Age: 69
End: 2024-12-30
Payer: MEDICARE

## 2025-01-30 ENCOUNTER — APPOINTMENT (OUTPATIENT)
Dept: NEUROLOGY | Facility: CLINIC | Age: 70
End: 2025-01-30
Payer: MEDICARE

## 2025-01-30 VITALS
HEIGHT: 64 IN | WEIGHT: 169 LBS | SYSTOLIC BLOOD PRESSURE: 146 MMHG | DIASTOLIC BLOOD PRESSURE: 91 MMHG | BODY MASS INDEX: 28.85 KG/M2 | HEART RATE: 84 BPM | TEMPERATURE: 97.2 F

## 2025-01-30 DIAGNOSIS — G20.C PARKINSONISM, UNSPECIFIED PARKINSONISM TYPE (MULTI): Primary | ICD-10-CM

## 2025-01-30 DIAGNOSIS — G20.A2 PARKINSON'S DISEASE WITHOUT DYSKINESIA, WITH FLUCTUATING MANIFESTATIONS: ICD-10-CM

## 2025-01-30 PROCEDURE — 3077F SYST BP >= 140 MM HG: CPT | Performed by: PSYCHIATRY & NEUROLOGY

## 2025-01-30 PROCEDURE — 1160F RVW MEDS BY RX/DR IN RCRD: CPT | Performed by: PSYCHIATRY & NEUROLOGY

## 2025-01-30 PROCEDURE — 3080F DIAST BP >= 90 MM HG: CPT | Performed by: PSYCHIATRY & NEUROLOGY

## 2025-01-30 PROCEDURE — 1036F TOBACCO NON-USER: CPT | Performed by: PSYCHIATRY & NEUROLOGY

## 2025-01-30 PROCEDURE — 3008F BODY MASS INDEX DOCD: CPT | Performed by: PSYCHIATRY & NEUROLOGY

## 2025-01-30 PROCEDURE — 4010F ACE/ARB THERAPY RXD/TAKEN: CPT | Performed by: PSYCHIATRY & NEUROLOGY

## 2025-01-30 PROCEDURE — G2211 COMPLEX E/M VISIT ADD ON: HCPCS | Performed by: PSYCHIATRY & NEUROLOGY

## 2025-01-30 PROCEDURE — 99204 OFFICE O/P NEW MOD 45 MIN: CPT | Performed by: PSYCHIATRY & NEUROLOGY

## 2025-01-30 PROCEDURE — 1126F AMNT PAIN NOTED NONE PRSNT: CPT | Performed by: PSYCHIATRY & NEUROLOGY

## 2025-01-30 PROCEDURE — 1159F MED LIST DOCD IN RCRD: CPT | Performed by: PSYCHIATRY & NEUROLOGY

## 2025-01-30 RX ORDER — CARBIDOPA AND LEVODOPA 25; 100 MG/1; MG/1
TABLET, EXTENDED RELEASE ORAL
Qty: 90 TABLET | Refills: 3 | Status: SHIPPED | OUTPATIENT
Start: 2025-01-30

## 2025-01-30 RX ORDER — CARBIDOPA AND LEVODOPA 25; 100 MG/1; MG/1
TABLET ORAL
Qty: 360 TABLET | Refills: 3 | Status: SHIPPED | OUTPATIENT
Start: 2025-01-30

## 2025-01-30 ASSESSMENT — ENCOUNTER SYMPTOMS
LOSS OF SENSATION IN FEET: 0
DEPRESSION: 0
OCCASIONAL FEELINGS OF UNSTEADINESS: 1

## 2025-01-30 ASSESSMENT — PAIN SCALES - GENERAL: PAINLEVEL_OUTOF10: 0-NO PAIN

## 2025-01-30 NOTE — PROGRESS NOTES
Consultation:   Subjective      Юлия Ku is a 69 y.o. year old female is here for consult for gait ataxia.     Referred by Bibiana Dupree DO  Accompanied by  , who is an independent historian.     HPI  She has had 2 years of progressive gait issues.  At that time she fell down 11 steps.  She is in a wheelchair.  Falling is frequent. Falls forward.   Short term memory , orientation is affected.  Forgetting conversations quickly.   No major head injuries.  No dizziness.  She has not had physical therapy recently.  She has had in the past that he never felt it helped her.  She has lived in [place] for 4 years and previously was in [place].  She has had adjustments to her diabetic medications as well as her thyroid medications.  No double vision.  No speech changes.   She was seen by neurologists, Dr. Carrizales and Dr. Willard,  at [facility] diagnosed with parkinsonism and freezing of gait.  Patient and her  felt that the levodopa has helped her freezing.  She does feel like her freezing is worsening overnight when she wakes up to use the bathroom.  She is going to get neuropsychology testing.  No tremors.  Sometimes her legs will be restless at times when sitting.  No dyskinesia.  Sinemet 25-100mg 1.5 tabs QID- has helped.  No adverse effects.  No hallucinations.  Has brain fog since COVID 2021.   She does have slightly high cholesterol.  Patient had a brain MRI in 2023 at [facility]. Her b6 was low.   Review of Systems    Patient Active Problem List   Diagnosis    Type 2 diabetes mellitus    Benign essential hypertension    Hyperlipidemia    Postablative hypothyroidism    Clinical xerostomia    Cognitive changes    Post-acute sequelae of COVID-19 (PASC)    Orthostatic hypotension    Abnormal echocardiography    Arteriosclerosis of coronary artery    History of Graves' disease    Immunologic deficiency syndrome (Multi)    Impairment of balance    Keratoconjunctivitis sicca, in Sjogren's  syndrome (Multi)    Shuffling gait    Hypogammaglobulinemia (Multi)    Cervical myelopathy with cervical radiculopathy    Polyneuropathy associated with underlying disease (Multi)    Hypertension associated with diabetes (Multi)    Chronic sclerosing sialadenitis    Dizziness    Brain fog     Past Medical History:   Diagnosis Date    Disease of thyroid gland graves     Fatigue 2023    Hypertension     Personal history of irradiation     Status post radioactive iodine thyroid ablation    Personal history of other diseases of the circulatory system     History of hypertension    Personal history of other endocrine, nutritional and metabolic disease     History of type 2 diabetes mellitus    Personal history of other endocrine, nutritional and metabolic disease     History of Graves' disease    Personal history of other endocrine, nutritional and metabolic disease     History of hyperlipidemia    Personal history of other endocrine, nutritional and metabolic disease     History of hypothyroidism    Right ventricular dysfunction 2024     Past Surgical History:   Procedure Laterality Date    BREAST BIOPSY  2008    CARDIAC CATHETERIZATION      OTHER SURGICAL HISTORY  2021    No history of surgery     Social History     Tobacco Use    Smoking status: Former     Current packs/day: 0.00     Average packs/day: 0.3 packs/day for 2.0 years (0.5 ttl pk-yrs)     Types: Cigarettes     Start date: 1976     Quit date: 1978     Years since quittin.6    Smokeless tobacco: Never   Substance Use Topics    Alcohol use: Never     family history includes Alzheimer's disease in her mother; Breast cancer in her mother; Cancer in her mother; Heart disease in her maternal grandfather; Hypertension in her father and mother; Kidney disease in her father.    Current Outpatient Medications:     carbidopa-levodopa (Sinemet)  mg tablet, Take 0.5 tablets by mouth., Disp: , Rfl:     cycloSPORINE (Restasis)  0.05 % ophthalmic emulsion, Administer 1 drop into both eyes every 12 hours., Disp: , Rfl:     EPINEPHrine 0.3 mg/0.3 mL injection syringe, Inject 0.3 mL (0.3 mg) into the muscle 1 time if needed., Disp: , Rfl:     fluticasone propion-salmeteroL (Advair HFA) 230-21 mcg/actuation inhaler, Inhale 2 puffs 2 times a day. Rinse mouth with water after use to reduce aftertaste and incidence of candidiasis. Do not swallow., Disp: 12 g, Rfl: 11    FreeStyle Srinivas 3 Sensor device, USE AS DIRECTED TO CHECK BLOOD GLUCOSE. CHANGE EVERY 14 DAYS, Disp: , Rfl:     losartan (Cozaar) 50 mg tablet, Take 1 tablet (50 mg) by mouth once daily., Disp: 90 tablet, Rfl: 1    NON FORMULARY, Glia Plasmalogens, Disp: , Rfl:     omega-3 fatty acids (SUPER OMEGA-3 ORAL), Take by mouth., Disp: , Rfl:     pilocarpine (Salagen) 5 mg tablet, TAKE 1 TABLET(5 MG) BY MOUTH THREE TIMES DAILY, Disp: 270 tablet, Rfl: 1    PreviDent 5000 Dry Mouth 1.1 % dental paste, Apply to teeth once daily., Disp: , Rfl:     cholecalciferol, vitD3,/vit K2 (vitamin D3-vitamin K2) 250 mcg (10,000 unit)-45 mcg capsule, Take by mouth. (Patient not taking: Reported on 1/30/2025), Disp: , Rfl:     cholestyramine (Questran) 4 gram powder, MIX 4 GRAMS INTO LIQUID AND DRINK TWICE DAILY AS DIRECTED (Patient not taking: Reported on 1/30/2025), Disp: , Rfl:     immun glob G,IgG,/pro/IgA 0-50 (HIZENTRA SUBQ), Inject 10 g under the skin every 14 (fourteen) days. (Patient not taking: Reported on 1/30/2025), Disp: , Rfl:     LevoxyL 88 mcg tablet, Take 1 tablet (88 mcg) by mouth early in the morning.. Take on an empty stomach at the same time each day, either 30 to 60 minutes prior to breakfast (Patient not taking: Reported on 1/30/2025), Disp: , Rfl:     metFORMIN  mg 24 hr tablet, 2 tablets every morning and one tablet every evening (Patient not taking: Reported on 1/30/2025), Disp: 270 tablet, Rfl: 3    NON FORMULARY, Take 2 each by mouth once daily. Ferrasorb (Patient not  "taking: Reported on 1/30/2025), Disp: , Rfl:   Allergies   Allergen Reactions    Mold Shortness of breath    Codeine Hives and Swelling    Lisinopril Cough     BP (!) 146/91 (BP Location: Right arm, Patient Position: Sitting)   Pulse 84   Temp 36.2 °C (97.2 °F)   Ht 1.626 m (5' 4\")   Wt 76.7 kg (169 lb)   BMI 29.01 kg/m²   Neurological Exam/Physical Exam:    Constitutional: General appearance: no acute distress. Pleasant.   Auscultation of Heart: Regular rate and rhythm, no murmurs, normal S1 and S2.   Carotid Arteries: no bruits  Peripheral Vascular Exam: No swelling in extremities  Mental status: no distress, alert, interactive and cooperative. Affect is appropriate.   Attention: normal attention  Patient at times is confused.  She does make paraphasic errors.  Fund of knowledge: Patient displays adequate knowledge of current events.  Eyes: The ophthalmoscopic examination was normal.   Cranial nerve II: Visual fields full to confrontation.   Cranial nerves III, IV, and VI: Pupils round, equally reactive to light; EOMs intact. No nystagmus.   Cranial Nerve V: Facial sensation intact to LT bilaterally.   Cranial nerve VII: no facial droop  Cranial nerve VIII: Hearing is intact  Cranial nerves IX and X: Palate elevates symmetrically.   Cranial nerve XI: Shoulder shrug intact.   Cranial nerve XII: Tongue is midline.  Motor:  Muscle bulk was normal in both upper and lower extremities.    No atrophy.   Strength is normal.  No resting tremor.  No rigidity or bradykinesia.  Deep Tendon Reflexes: left biceps 2+ , right biceps 2+, left triceps 2+, right triceps 2+, left brachioradialis 2+, right brachioradialis 2+, left patella 2+, right patella 2+, left ankle jerk 2+, right ankle jerk 2+   Plantar Reflex: Toes downgoing to plantar stimulation on the left. Toes downgoing to plantar stimulation on the right.   Sensory Exam: Normal to vibratory sensation  Coordination:  no limb dystaxia  Gait: she does have freezing of " RLE with initiation of gait as well as turns.  She holds her 's arm for stability.  She has severe postural instability.       Labs:  CBC:   Lab Results   Component Value Date    WBC 4.2 (L) 09/26/2024    HGB 15.0 09/26/2024    HCT 44.1 09/26/2024     09/26/2024     BMP:   Lab Results   Component Value Date     04/09/2024    K 4.4 04/09/2024     04/09/2024    CO2 25 04/09/2024    BUN 19 04/09/2024    CREATININE 0.82 04/09/2024    CALCIUM 9.5 04/09/2024     LFT:   Lab Results   Component Value Date    ALKPHOS 46 04/09/2024    BILITOT 0.7 04/09/2024    PROT 6.4 04/09/2024    ALBUMIN 4.3 04/09/2024    ALT 6 (L) 04/09/2024    AST 13 04/09/2024    GGT 12 09/26/2024       Labs were viewed personally.   Kidney function normal.  CBC shows no signs of anemia.  Electrolytes normal.  LFTS are also unremarkable.       Assessment/Plan   Problem List Items Addressed This Visit    None  Visit Diagnoses         Codes    Parkinsonism, unspecified Parkinsonism type (Multi)    -  Primary G20.C    Relevant Medications    carbidopa-levodopa (Sinemet CR)  mg ER tablet          Possible PSP versus festination of gait.  Sinemet has been helping her.    Will order physical therapy and try Sinemet ER in the evening.  This is a chronic neurologic condition that requires ongoing care and monitoring. This is a complex, serious condition that needs long term care going forward. Between myself and the patient we will be changing direction of care depending on responses to treatment.   Today we discussed medication options, non medication options for management and various other symptoms that are in relation to this disease.  I will continue to be involved in the care of this patient.

## 2025-01-30 NOTE — PATIENT INSTRUCTIONS
"It was a pleasure seeing you today.     Take Sinemet 25-100mg 1 tab ER at bedtime.     Please make a follow up appointment in 6 months.  You may also schedule a phone or virtual visit sooner on a Friday morning with me as needed before the next clinic appointment.     For any urgent issues or needing to speak to a medical assistant please call [phone], option 6 during our office hours Monday-Friday 8am-4pm, and leave a voicemail with your concern.  My office will try to reach back you as soon as possible within 24 (business) hours.  If you have an emergency please call 911 or visit a local urgent care or nearest emergency room.      Please understand that Videofropper is a useful communication tool for simple \"normal\" results or a refill request but I would not recommend using this tool for emergent or urgent issues or for conversations with me.  I am happy to ask my staff to rearrange a follow up visit or a virtual visit sooner than requested if appropriate for your care.    "

## 2025-01-31 ENCOUNTER — OFFICE VISIT (OUTPATIENT)
Dept: PRIMARY CARE | Facility: CLINIC | Age: 70
End: 2025-01-31
Payer: MEDICARE

## 2025-01-31 ENCOUNTER — TELEPHONE (OUTPATIENT)
Dept: PRIMARY CARE | Facility: CLINIC | Age: 70
End: 2025-01-31

## 2025-01-31 VITALS
OXYGEN SATURATION: 95 % | BODY MASS INDEX: 29.01 KG/M2 | SYSTOLIC BLOOD PRESSURE: 122 MMHG | TEMPERATURE: 97.4 F | HEART RATE: 92 BPM | WEIGHT: 169 LBS | DIASTOLIC BLOOD PRESSURE: 64 MMHG

## 2025-01-31 DIAGNOSIS — E11.9 TYPE 2 DIABETES MELLITUS WITHOUT COMPLICATION, WITH LONG-TERM CURRENT USE OF INSULIN (MULTI): Chronic | ICD-10-CM

## 2025-01-31 DIAGNOSIS — J30.9 ALLERGIC RHINITIS, UNSPECIFIED SEASONALITY, UNSPECIFIED TRIGGER: Primary | ICD-10-CM

## 2025-01-31 DIAGNOSIS — Z91.148 NONCOMPLIANCE WITH MEDICATIONS: ICD-10-CM

## 2025-01-31 DIAGNOSIS — R41.89 BRAIN FOG: Primary | ICD-10-CM

## 2025-01-31 DIAGNOSIS — Z79.4 TYPE 2 DIABETES MELLITUS WITHOUT COMPLICATION, WITH LONG-TERM CURRENT USE OF INSULIN (MULTI): Chronic | ICD-10-CM

## 2025-01-31 DIAGNOSIS — E89.0 POSTABLATIVE HYPOTHYROIDISM: Chronic | ICD-10-CM

## 2025-01-31 PROBLEM — I25.10 ARTERIOSCLEROSIS OF CORONARY ARTERY: Status: RESOLVED | Noted: 2022-06-27 | Resolved: 2025-01-31

## 2025-01-31 PROBLEM — M35.01 KERATOCONJUNCTIVITIS SICCA, IN SJOGREN'S SYNDROME (MULTI): Status: RESOLVED | Noted: 2024-01-29 | Resolved: 2025-01-31

## 2025-01-31 PROCEDURE — 1036F TOBACCO NON-USER: CPT | Performed by: FAMILY MEDICINE

## 2025-01-31 PROCEDURE — 4010F ACE/ARB THERAPY RXD/TAKEN: CPT | Performed by: FAMILY MEDICINE

## 2025-01-31 PROCEDURE — 99214 OFFICE O/P EST MOD 30 MIN: CPT | Performed by: FAMILY MEDICINE

## 2025-01-31 PROCEDURE — 3074F SYST BP LT 130 MM HG: CPT | Performed by: FAMILY MEDICINE

## 2025-01-31 PROCEDURE — G2211 COMPLEX E/M VISIT ADD ON: HCPCS | Performed by: FAMILY MEDICINE

## 2025-01-31 PROCEDURE — 1159F MED LIST DOCD IN RCRD: CPT | Performed by: FAMILY MEDICINE

## 2025-01-31 PROCEDURE — 3078F DIAST BP <80 MM HG: CPT | Performed by: FAMILY MEDICINE

## 2025-01-31 RX ORDER — MONTELUKAST SODIUM 10 MG/1
10 TABLET ORAL NIGHTLY
Qty: 90 TABLET | Refills: 2 | Status: SHIPPED | OUTPATIENT
Start: 2025-01-31

## 2025-01-31 ASSESSMENT — ENCOUNTER SYMPTOMS
COUGH: 0
SHORTNESS OF BREATH: 0
FATIGUE: 1
SLEEP DISTURBANCE: 1
WHEEZING: 0
PALPITATIONS: 0

## 2025-01-31 NOTE — TELEPHONE ENCOUNTER
Pt requesting refill on Montelukast RX in [employer] message. Looks like last RX sent for this was 11/10/23. Please advise on refill? Thanks, CG

## 2025-01-31 NOTE — ASSESSMENT & PLAN NOTE
Suspect multifactorial and due to long-COVID, non-compliance with thyroid medication and untreated sleep apnea.

## 2025-01-31 NOTE — PROGRESS NOTES
Subjective   Patient ID: Юлия Ku is a 69 y.o. female who presents for Diabetes (Discuss medication).    Юлия continues to experience brain fog. Symptoms come and go. She thought that her thyroid medication could be causing brain fog problems so she has not been taking her thyroid medication regularly. She was briefly placed on [place] Thyroid by an alternative medicine docotr in the past; she currently has a prescription for levothyroxine. She feels the medication makes her tired every time she takes it. She does not currently have an endocrinologist.     She did recently complete a sleep study that was positive for mild sleep apnea. She has daytime brain fog and fatigue. She wakes frequently during the night. She snores. She does not feel well-rested on awakening.     She stopped taking metformin because she felt it could be causing a problem. She is not currently on any diabetes medication. Morning blood sugars run around 120-130. No low sugars. She is concerned that she could have lactic acidosis from the medication.         Review of Systems   Constitutional:  Positive for fatigue.   Respiratory:  Negative for cough, shortness of breath and wheezing.    Cardiovascular:  Negative for chest pain and palpitations.   Psychiatric/Behavioral:  Positive for sleep disturbance.        Objective   /64   Pulse 92   Temp 36.3 °C (97.4 °F)   Wt 76.7 kg (169 lb)   SpO2 95%   BMI 29.01 kg/m²     Physical Exam  Constitutional:       General: She is not in acute distress.     Appearance: Normal appearance.   HENT:      Head: Normocephalic.      Mouth/Throat:      Mouth: Mucous membranes are moist.   Eyes:      Extraocular Movements: Extraocular movements intact.      Conjunctiva/sclera: Conjunctivae normal.   Cardiovascular:      Rate and Rhythm: Normal rate and regular rhythm.      Heart sounds: No murmur heard.  Pulmonary:      Breath sounds: No wheezing or rhonchi.   Musculoskeletal:      Cervical back:  Neck supple.   Skin:     General: Skin is warm and dry.   Neurological:      Mental Status: She is alert.   Psychiatric:         Mood and Affect: Mood normal.         Behavior: Behavior normal.         Assessment/Plan   Problem List Items Addressed This Visit             ICD-10-CM    Type 2 diabetes mellitus (Chronic) E11.9     Patient stopped metformin. Recheck HgbA1c.          Relevant Orders    Comprehensive Metabolic Panel    Hemoglobin A1C    Albumin-Creatinine Ratio, Urine Random    Postablative hypothyroidism (Chronic) E89.0     Discussed importance of taking thyroid medication daily. Recheck TSH.         Relevant Orders    TSH with reflex to Free T4 if abnormal    Referral to Endocrinology    Brain fog - Primary R41.89     Suspect multifactorial and due to long-COVID, non-compliance with thyroid medication and untreated sleep apnea.          Relevant Orders    Ammonia     Other Visit Diagnoses         Codes    Noncompliance with medications     Z91.148    Discussed importance of taking medications as prescribed.

## 2025-02-05 LAB
ALBUMIN SERPL-MCNC: 4.5 G/DL (ref 3.6–5.1)
ALP SERPL-CCNC: 55 U/L (ref 37–153)
ALT SERPL-CCNC: 7 U/L (ref 6–29)
AMMONIA PLAS-SCNC: 90 UMOL/L
ANION GAP SERPL CALCULATED.4IONS-SCNC: 12 MMOL/L (CALC) (ref 7–17)
AST SERPL-CCNC: 14 U/L (ref 10–35)
BILIRUB SERPL-MCNC: 0.6 MG/DL (ref 0.2–1.2)
BUN SERPL-MCNC: 12 MG/DL (ref 7–25)
CALCIUM SERPL-MCNC: 9.2 MG/DL (ref 8.6–10.4)
CHLORIDE SERPL-SCNC: 101 MMOL/L (ref 98–110)
CO2 SERPL-SCNC: 24 MMOL/L (ref 20–32)
CREAT SERPL-MCNC: 0.87 MG/DL (ref 0.5–1.05)
EGFRCR SERPLBLD CKD-EPI 2021: 73 ML/MIN/1.73M2
EST. AVERAGE GLUCOSE BLD GHB EST-MCNC: 146 MG/DL
EST. AVERAGE GLUCOSE BLD GHB EST-SCNC: 8.1 MMOL/L
GLUCOSE SERPL-MCNC: 93 MG/DL (ref 65–139)
HBA1C MFR BLD: 6.7 % OF TOTAL HGB
POTASSIUM SERPL-SCNC: 3.9 MMOL/L (ref 3.5–5.3)
PROT SERPL-MCNC: 6.5 G/DL (ref 6.1–8.1)
SODIUM SERPL-SCNC: 137 MMOL/L (ref 135–146)
T4 FREE SERPL-MCNC: 0.4 NG/DL (ref 0.8–1.8)
TSH SERPL-ACNC: 62.72 MIU/L (ref 0.4–4.5)

## 2025-02-06 ENCOUNTER — PATIENT MESSAGE (OUTPATIENT)
Dept: PRIMARY CARE | Facility: CLINIC | Age: 70
End: 2025-02-06
Payer: MEDICARE

## 2025-02-06 DIAGNOSIS — E89.0 POSTABLATIVE HYPOTHYROIDISM: Primary | Chronic | ICD-10-CM

## 2025-02-06 LAB
ALBUMIN/CREAT UR: 34 MG/G CREAT
CREAT UR-MCNC: 114 MG/DL (ref 20–275)
MICROALBUMIN UR-MCNC: 3.9 MG/DL

## 2025-02-10 RX ORDER — LEVOTHYROXINE SODIUM 25 UG/1
25 CAPSULE ORAL DAILY
Qty: 30 CAPSULE | Refills: 1 | Status: SHIPPED | OUTPATIENT
Start: 2025-02-10 | End: 2025-02-10 | Stop reason: ALTCHOICE

## 2025-02-10 RX ORDER — LEVOTHYROXINE SODIUM 200 UG/1
200 CAPSULE ORAL DAILY
Qty: 30 CAPSULE | Refills: 1 | Status: SHIPPED | OUTPATIENT
Start: 2025-02-10 | End: 2025-02-10 | Stop reason: ALTCHOICE

## 2025-02-10 RX ORDER — LEVOTHYROXINE SODIUM 200 UG/1
200 CAPSULE ORAL DAILY
Qty: 30 CAPSULE | Refills: 1 | Status: SHIPPED | OUTPATIENT
Start: 2025-02-10

## 2025-02-10 RX ORDER — LEVOTHYROXINE SODIUM 25 UG/1
25 CAPSULE ORAL DAILY
Qty: 30 CAPSULE | Refills: 1 | Status: SHIPPED | OUTPATIENT
Start: 2025-02-10

## 2025-02-12 ENCOUNTER — OFFICE VISIT (OUTPATIENT)
Facility: CLINIC | Age: 70
End: 2025-02-12
Payer: MEDICARE

## 2025-02-12 DIAGNOSIS — R41.89 BRAIN FOG: ICD-10-CM

## 2025-02-12 DIAGNOSIS — R41.89 COGNITIVE CHANGES: ICD-10-CM

## 2025-02-12 PROCEDURE — 96132 NRPSYC TST EVAL PHYS/QHP 1ST: CPT | Performed by: PSYCHOLOGIST

## 2025-02-12 PROCEDURE — 96132 NRPSYC TST EVAL PHYS/QHP 1ST: CPT | Mod: AH | Performed by: PSYCHOLOGIST

## 2025-02-12 PROCEDURE — 4010F ACE/ARB THERAPY RXD/TAKEN: CPT | Performed by: PSYCHOLOGIST

## 2025-02-12 NOTE — PROGRESS NOTES
"Neuropsychology Note    Name: Юлия Ku  : 1955  MRN: [id number]  Referring Provider:  Bibiana Dupree DO (PCP)     Yao Willard MD (Neurology, [facility])    Date of Service: 2025    Reason for Visit: Ms. Ku was referred for neuropsychological evaluation due to report of memory difficulty and other cognitive changes in the context of a complicated medical history.    History of Presenting Illness: Ms. Ku is a 69-year-old, right-handed,  female, with complaints of persistent memory difficulty and brain fog following COVID-19 infection in 2021. Medical history also notable for hypertension, hyperlipidemia, type II diabetes mellitus, Grave's disease (s/p ablation, with associated hypothyroidism), CAD, hypogammaglobulinemia, and ongoing neurological workup related to progressive gait disorder (with FOG).     Per brief record review:     - 2021: COVID-19 infection (PCR confirmed) with fatigue, altered sense of taste, and brain fog; no hospitalization or treatment. Fatigue and brain fog persist.   - Workup for other post-COVID symptoms revealed thyroid and diabetes \"out of control\" (TSH suppressed; hemoglobin A1c elevated), as well as uncontrolled blood pressure.   - 2023: Neurological evaluation with Jaquan Zavala MD ([facility]) for progressive gait issues; clinical impressions were of possible functional component (but unclear); MS/other demyelinating disorder considered less likely.   - 10/12/2023: Neurological evaluation with Noel Carrizales MD ([facility]); diagnosed freezing of gait; trial Sinemet initiated, with variable reported benefit.    - 11/15/2023: Home sleep apnea test indicated no significant sleep-related breathing disorder.   - 3/12/2024: Head CT w/o IV contrast (following head injury) read by radiologist as showing scattered patchy foci of low attenuation within supratentorial white matter, nonspecific, but likely " "representing mild microvascular ischemia, and mild generalized volume loss; lateral and third ventricles were enlarged, with configuration suggesting central white matter volume loss.    - 7/3/2024: MoCA = 23/30 (0/1 letter fluency; 0/2 abstraction; 0/5 recall). Prescription Aricept initiated.   - 8/14/2024 PCP visit note: \"She would like a prescription for Adderall to help with attention issues. She was given a prescription for Aricept by her neurologist but stopped taking it because it did not seem to be doing anything.\"   - 12/7/2024: In-lab polysomnography demonstrated prolonged sleep onset and prolonged REM sleep latency, with sleep efficiency of 43.8%.   RDI3%=6.5/hour, suggesting mild obstructive sleep apnea (AASM definition); RDI4% = 0.0/hour, suggesting no significant sleep-related breathing disorder (CMS definition).  Frequent PLMs were noted (PLM index = 149.0/hr; 2.1% were associated with arousals; PLM arousal index = 3.1/hour), which can be seen in a variety of conditions, including sleep disorders (e.g., RLS), medication effects, or iron deficiency (especially ferritin <75).  REM sleep without atonia was noted (though not associated with vocalization or abnormal motor behavior during the study), which can be associated with medication effects, sleep apnea, or REM sleep behavior disorders (RBD).    Current Medical Providers   - PCP:  Bibiana Dupree DO   - Neurology: Shikha Aguilar MD; Noel Carrizales MD ([facility]); Yao Willard MD ([facility]; for cognitive concerns)   - Endocrinology: Jered Rosales MD   - Rheumatology: Tiff Obrien MD ([facility]) and Noel Abdul MD ([facility])    - Cardiology: Sal Funez MD    - ENT: Abner Mejía MD (for chronic sclerosing sialadenitis)   - Immunology: Fanny Tian DO (for low immunoglobulins)   - [facility] (ANTONELLA Ramos-CNP)   - Psychiatry: AMANDEEP Pedro (for anxiety)    Educational " attainment: Master's degree in journalism.   Occupation: ; retired in 2023.   .    Procedures:  Review of available records; Clinical interview with Ms. Ku and her .     The purpose of this evaluation was reviewed, as were procedural details, issues related to confidentiality, and options for receiving feedback regarding results.     During this discussion, Ms. Ku's  questioned the appropriateness/potential benefit of cognitive assessment in the context of the patient's other symptoms/medical conditions; I provided patient education regarding the role of the neuropsychological evaluation in diagnostic clarification, treatment planning, and symptom monitoring.     He then suggested that completing this evaluation without first addressing Ms. Dwyers untreated sleep apnea would be of limited benefit, given the degree to which disrupted sleep (and potential oxygen deprivation) likely interfere to the patient's cognitive and functional status. He hopes to see significant improvement with use of CPAP (which has been ordered).     I cautioned him to temper his expectations, given that sleep study indicated only mild sleep apnea; however, fatigue/insufficient restorative sleep can absolutely diminish cognitive effectiveness. I explained that if the patient's cognitive status seems to be stable/at new baseline (regardless of medical status), formal evaluation would be beneficial to clarify present strengths/weaknesses (minimally to establish a benchmark); however, if cognitive status seems to be in flux, it might be best to defer formal evaluation until treatable factors have been addressed.    Ms. Ku's  remained steadfast on deferring the evaluation, and the patient ultimately agreed.    All questions were answered; Ms. Ku and her  verbalized understanding.     Behavioral Observations/Neurobehavioral Status Exam: Ms.  Elmira arrived 25 minutes early and accompanied by her . She was casually dressed and appropriately groomed. She was seated in a wheelchair for the duration of the appointment but appeared able to maneuver it somewhat independently. No involuntary motor movements were observed. She was alert; hearing and receptive language appeared largely intact on informal observation. Ms. Ku responded appropriately to direct questions but was otherwise minimally engaged in the discussion and primarily deferred to her . Conversational speech (though limited) was generally fluent and normal in rate, prosody, and volume, without any obvious language abnormalities. Expressed thoughts were logical and goal-directed. Affect was largely restricted and perhaps somewhat dysphoric.     Summary: After discussion regarding the role of the neuropsychological evaluation in medical workup and treatment planning, patient and  decided they would like to defer this assessment until after addressing treatable factors that could be contributing to cognitive difficulties (e.g., sleep apnea).     If concerns persist or worsen after other factors are adequately managed (or if Ms. Ku and her  reconsider in the interim), I will be happy to see her back for comprehensive neuropsychological evaluation.     Recommendations/Considerations:   Given Ms. Ku's symptom constellation, ongoing neurological workup, monitoring, and follow-up care with Dr. Aguilar would be prudent.     2.   Restorative sleep (7-9 hours/night recommended for adults) is critical for daytime alertness/safety, cognitive effectiveness, and mood, as well as physical, neurological, and mental health more broadly. Ms. Ku seems to have significant sleep disturbance and daytime fatigue, with plan to initiate CPAP to address obstructive sleep apnea. If difficulties persist, she is encouraged to work closely with her medical  providers to address other factors that could potentially be interfering with sleep (e.g., periodic limb movements); she might benefit from consultation with a sleep specialist.    In general, the following behavioral strategies and environmental modifications can help optimize sleep duration and quality:            a.  Set a fixed bedtime and waking time every day.            b.  Avoid napping during the day.             c.  Avoid alcohol, caffeine and heavy, spicy, or sugary foods 4-6 hours before bedtime.             d.  Exercise regularly, but not right before going to bed.            e.  Block out all distractions by turning off - or even removing from the room - electronic devices such as TV, computer, phone, video player, audio player, marco antonio device, etc.            f.  Use comfortable bedding, set a comfortable temperature, and keep the room well ventilated.            g.  Do not use the bed as an office, workroom, or recreation room.             h.  Establish a pre-sleep ritual, such as a warm bath or a few minutes of reading.            i.  If prone to anxiety, relaxation techniques such as yoga and deep breathing can be helpful.     3.   Stress, anxiety, and/or depression may intermittently reduce cognitive effectiveness, typically through interfering with normal attention and efficiency of information processing. These difficulties can compromise other aspects of cognition, such as remembering conversations; attending to multiple tasks at once; and organizing thoughts and speech. Also, these factors can hinder the ability to adapt to, or develop compensatory strategies for, any cognitive changes that may be present.     Ongoing psychiatric medication management is recommended. Ms. Ku might benefit from consultation with a psychiatrist familiar with the neurocognitive consequences of stress/depression/anxiety in the context of a potential concurrent neurological process, as well as the management  "of neuropsychiatric symptoms. Dr. Jessica León, Dr. Malachi Souza, Dr. Cesar Willingham, Dr. Joelle Espinal, or one of their colleagues in Psychiatry ([phone]) can assist with optimizing pharmacological management of emotional factors that might be exacerbating cognitive difficulties and/or otherwise interfering with everyday functioning. [place], Dr. Nancy Brooks (Geriatric Psychiatry), Dr. Jean Kaminski ([facility]; [phone]), Dr. Lisa Back MD ([phone]) are both highly recommended.     Ms. Ku may also benefit from individual psychotherapy / adjustment counseling, particularly brief structured cognitive behavioral therapy (CBT) or acceptance and commitment therapy (ACT), to learn effective coping skills and strategies for managing stress, mood, and physical symptoms that interfere with her daily functioning. To this end, she may benefit from the following resources:  A cursory list of potential therapists near Ms. Ku can be found on the \"Psychology Today\" website ([employer]; \"Find a Therapist\" in the top navigation bar), which can be further filtered by provider speciality, accepted insurances, therapy modality, etc.  The [facility] ([phone]) on the [facility] in [place] offers comprehensive services on a sliding scale fee.   Providers within the community mental health system that offer public-supported services near Ms. Ku's home can be found on the [facility] of Mental Health Services website:  http://mha.[place].gov/.    4.   Ms. Ku is encouraged to remain in close contact with medical professionals regarding her cerebrovascular risk factors (e.g., hypertension, hyperlipidemia, diabetes), hypothyroidism, and other conditions that may affect current and future brain function.     The following activities and interventions are recommended for optimizing brain health and " preserving cognitive function:            a.  “heart-healthy”/cardiovascular diet;            b.  good stress management (e.g., relaxation techniques);            c.  management of any vascular risks (e.g., hypertension, cholesterol);            d.  30-60 minutes of daily aerobic exercise (e.g., walking, swimming);            e.  social engagement (e.g., getting together with other people);             f.  adequate sleep; and             g.  cognitively stimulating activities (e.g., classes to learn new things, challenging puzzles).      Leading a physically, socially, and cognitively active lifestyle is important for healthy brain aging. Within the bounds of safety, good judgment, and medical advice, this includes engaging in regular exercise (e.g., walking, swimming, yoga); Ms. Ku would likely benefit from involvement in supervised physical activity (e.g., PT; going to the gym with a friend/family member). As much as possible, given health/safety considerations, Ms. Ku is also encouraged to maximize her engagement in social activities, as a means of promoting cognitive stimulation and emotional benefits that will optimize quality of life. She is likely to benefit from exploring/pursue new hobbies that allow for a balance of enjoyment, independence, and safety.      Thank you for the opportunity to participate in Ms. Ku's evaluation and care. If I can be of further assistance, please do not hesitate to contact me at [phone].        Time-based service:   47559 (56 minutes)

## 2025-03-04 ENCOUNTER — APPOINTMENT (OUTPATIENT)
Dept: PSYCHOLOGY | Facility: CLINIC | Age: 70
End: 2025-03-04
Payer: MEDICARE

## 2025-03-22 ENCOUNTER — PATIENT MESSAGE (OUTPATIENT)
Dept: PRIMARY CARE | Facility: CLINIC | Age: 70
End: 2025-03-22
Payer: MEDICARE

## 2025-03-24 ENCOUNTER — TELEPHONE (OUTPATIENT)
Dept: PRIMARY CARE | Facility: CLINIC | Age: 70
End: 2025-03-24
Payer: MEDICARE

## 2025-03-24 DIAGNOSIS — E89.0 POSTABLATIVE HYPOTHYROIDISM: Chronic | ICD-10-CM

## 2025-03-24 NOTE — TELEPHONE ENCOUNTER
Pt believes is has a UTI and called asking if Dr. AGUIRRE could send an order in to the lab so she could get tested for it.  Please advise.

## 2025-03-28 ENCOUNTER — EVALUATION (OUTPATIENT)
Dept: PHYSICAL THERAPY | Facility: HOSPITAL | Age: 70
End: 2025-03-28
Payer: MEDICARE

## 2025-03-28 ENCOUNTER — PATIENT MESSAGE (OUTPATIENT)
Dept: PRIMARY CARE | Facility: CLINIC | Age: 70
End: 2025-03-28

## 2025-03-28 ENCOUNTER — OFFICE VISIT (OUTPATIENT)
Dept: PRIMARY CARE | Facility: CLINIC | Age: 70
End: 2025-03-28
Payer: MEDICARE

## 2025-03-28 VITALS
TEMPERATURE: 97.8 F | DIASTOLIC BLOOD PRESSURE: 76 MMHG | HEART RATE: 86 BPM | OXYGEN SATURATION: 97 % | SYSTOLIC BLOOD PRESSURE: 130 MMHG

## 2025-03-28 DIAGNOSIS — G20.A2 PARKINSON'S DISEASE WITHOUT DYSKINESIA, WITH FLUCTUATING MANIFESTATIONS: ICD-10-CM

## 2025-03-28 DIAGNOSIS — E89.0 POSTABLATIVE HYPOTHYROIDISM: Chronic | ICD-10-CM

## 2025-03-28 DIAGNOSIS — Z91.148 NON COMPLIANCE W MEDICATION REGIMEN: ICD-10-CM

## 2025-03-28 DIAGNOSIS — R26.89 SHUFFLING GAIT: Primary | ICD-10-CM

## 2025-03-28 DIAGNOSIS — G47.33 OSA (OBSTRUCTIVE SLEEP APNEA): ICD-10-CM

## 2025-03-28 DIAGNOSIS — R35.0 URINARY FREQUENCY: ICD-10-CM

## 2025-03-28 DIAGNOSIS — R26.89 IMPAIRMENT OF BALANCE: ICD-10-CM

## 2025-03-28 DIAGNOSIS — R29.898 BILATERAL LEG WEAKNESS: ICD-10-CM

## 2025-03-28 DIAGNOSIS — R41.89 BRAIN FOG: Primary | ICD-10-CM

## 2025-03-28 PROCEDURE — 3075F SYST BP GE 130 - 139MM HG: CPT | Performed by: FAMILY MEDICINE

## 2025-03-28 PROCEDURE — 4010F ACE/ARB THERAPY RXD/TAKEN: CPT | Performed by: FAMILY MEDICINE

## 2025-03-28 PROCEDURE — 1159F MED LIST DOCD IN RCRD: CPT | Performed by: FAMILY MEDICINE

## 2025-03-28 PROCEDURE — G2211 COMPLEX E/M VISIT ADD ON: HCPCS | Performed by: FAMILY MEDICINE

## 2025-03-28 PROCEDURE — 3078F DIAST BP <80 MM HG: CPT | Performed by: FAMILY MEDICINE

## 2025-03-28 PROCEDURE — 97163 PT EVAL HIGH COMPLEX 45 MIN: CPT | Mod: GP | Performed by: PHYSICAL THERAPIST

## 2025-03-28 PROCEDURE — 97530 THERAPEUTIC ACTIVITIES: CPT | Mod: GP | Performed by: PHYSICAL THERAPIST

## 2025-03-28 PROCEDURE — 99214 OFFICE O/P EST MOD 30 MIN: CPT | Performed by: FAMILY MEDICINE

## 2025-03-28 PROCEDURE — 1036F TOBACCO NON-USER: CPT | Performed by: FAMILY MEDICINE

## 2025-03-28 RX ORDER — LEVOTHYROXINE SODIUM 50 UG/1
50 CAPSULE ORAL DAILY
Qty: 30 CAPSULE | Refills: 0 | Status: SHIPPED | OUTPATIENT
Start: 2025-03-28 | End: 2025-04-27

## 2025-03-28 ASSESSMENT — BALANCE ASSESSMENTS
TRANSFERS: NEEDS ONE PERSON TO ASSIST
PLACE ALTERNATE FOOT ON STEP OR STOOL WHILE STANDING UNSUPPORTED: LOOKS BEHIND FROM BOTH SIDES AND WEIGHT SHIFTS WELL
STANDING TO SITTING: ABLE TO STAND WITHOUT USING HANDS AND STABILIZE INDEPENDENTLY
STANDING UNSUPPORTED ONE FOOT IN FRONT: ABLE TO TAKE SMALL STEP INDEPENDENTLY AND HOLD 30 SECONDS
STANDING ON ONE LEG: TRIES TO LIFT LEG UNABLE TO HOLD 3 SECONDS BUT REMAINS STANDING INDEPENDENTLY
STANDING UNSUPPORTED: ABLE TO STAND 2 MINUTES WITH SUPERVISION
PICK UP OBJECT FROM THE FLOOR FROM A STANDING POSITION: ABLE TO PICK UP SLIPPER BUT NEEDS SUPERVISION
STANDING TO SITTING: CONTROLS DESCENT BY USING HANDS
LONG VERSION TOTAL SCORE (MAX 56): 36
STANDING UNSUPPORTED WITH EYES CLOSED: ABLE TO STAND 10 SECONDS WITH SUPERVISION
TURN 360 DEGREES: NEEDS ASSISTANCE WHILE TURNING
PLACE ALTERNATE FOOT ON STEP OR STOOL WHILE STANDING UNSUPPORTED: ABLE TO COMPLETE 4 STEPS WITHOUT AID WITH SUPERVISION
REACHING FORWARD WITH OUTSTRETCHED ARM WHILE STANDING: CAN REACH FORWARD 12 CM (5 INCHES)
STANDING UNSUPPORTED WITH FEET TOGETHER: ABLE TO PLACE FEET TOGETHER INDEPENDENTLY AND STAND 1 MINUTE WITH SUPERVISION
SITTING WITH BACK UNSUPPORTED BUT FEET SUPPORTED ON FLOOR OR ON A STOOL: ABLE TO SIT SAFELY AND SECURELY FOR 2 MINUTES

## 2025-03-28 ASSESSMENT — ENCOUNTER SYMPTOMS
DYSURIA: 0
FATIGUE: 1
OCCASIONAL FEELINGS OF UNSTEADINESS: 1
HEMATURIA: 0
DEPRESSION: 1
COUGH: 0
LOSS OF SENSATION IN FEET: 0
FREQUENCY: 1
CHILLS: 0
FEVER: 0

## 2025-03-28 ASSESSMENT — PATIENT HEALTH QUESTIONNAIRE - PHQ9
1. LITTLE INTEREST OR PLEASURE IN DOING THINGS: SEVERAL DAYS
2. FEELING DOWN, DEPRESSED OR HOPELESS: SEVERAL DAYS
10. IF YOU CHECKED OFF ANY PROBLEMS, HOW DIFFICULT HAVE THESE PROBLEMS MADE IT FOR YOU TO DO YOUR WORK, TAKE CARE OF THINGS AT HOME, OR GET ALONG WITH OTHER PEOPLE: VERY DIFFICULT
SUM OF ALL RESPONSES TO PHQ9 QUESTIONS 1 AND 2: 2

## 2025-03-28 ASSESSMENT — PAIN DESCRIPTION - DESCRIPTORS: DESCRIPTORS: ACHING

## 2025-03-28 ASSESSMENT — PAIN SCALES - GENERAL: PAINLEVEL_OUTOF10: 4

## 2025-03-28 ASSESSMENT — PAIN - FUNCTIONAL ASSESSMENT: PAIN_FUNCTIONAL_ASSESSMENT: 0-10

## 2025-03-28 NOTE — PROGRESS NOTES
Subjective   Patient ID: Юлия Ku is a 69 y.o. female who presents for brain fog, Dry Mouth, and Fatigue (X 2 years).    Юлия continues to experience bran fog daily. It has been worst over the past month. She was instructed to restart her thyroid medication last month, but she did not restart it because she thought the thyroid medication was causing her fatigue.     She did start using her CPAP machine nightly. No longer is waking up in the middle of the night. Still has some brain fog in the morning. Is not falling asleep during the day like she was before treatment. She does use a nasal pillow and  concerned may not be working well since patient sleeps with her mouth open.     Her sister is concerned that she might have a UTI causing her brain fog. Юлия reports that she has been urinating more frequently. No dysuria, fevers, flank pain or pelvic pain.          Review of Systems   Constitutional:  Positive for fatigue. Negative for chills and fever.   HENT:  Negative for congestion.    Respiratory:  Negative for cough.    Genitourinary:  Positive for frequency. Negative for dysuria, hematuria and urgency.       Objective   /76   Pulse 86   Temp 36.6 °C (97.8 °F)   SpO2 97%     Physical Exam  Constitutional:       General: She is not in acute distress.     Appearance: Normal appearance.   HENT:      Head: Normocephalic.      Mouth/Throat:      Mouth: Mucous membranes are moist.   Eyes:      Extraocular Movements: Extraocular movements intact.      Conjunctiva/sclera: Conjunctivae normal.   Cardiovascular:      Rate and Rhythm: Normal rate and regular rhythm.      Heart sounds: No murmur heard.  Pulmonary:      Breath sounds: No wheezing or rhonchi.   Musculoskeletal:      Cervical back: Neck supple.   Skin:     General: Skin is warm and dry.   Neurological:      Mental Status: She is alert.   Psychiatric:         Attention and Perception: Attention normal.         Mood and Affect: Mood  normal.         Speech: Speech normal.         Behavior: Behavior is slowed. Behavior is cooperative.         Assessment/Plan   Diagnoses and all orders for this visit:  Brain fog  Comments:  Likely multifactorial. Most recent TSH 62, and patient did not restart thyroid medication as directed. Discued role of thyroid in brain function.  Postablative hypothyroidism  Comments:  Since >63yo and off thyroid meds for >4mo, start Tirosint at 50mcg. Recheck labs in 4 weeks. Patient scheduled to see new endocrinologist in July.  Orders:  -     Tirosint 50 mcg capsule; Take 1 capsule (50 mcg) by mouth early in the morning.. Take on an empty stomach at the same time each day, either 30 to 60 minutes prior to breakfast  -     Thyroid Stimulating Hormone; Future  Urinary frequency  Comments:  Юлия unable to urinate in office. To drop off specimen at lab later today.  Orders:  -     Urinalysis with Reflex Culture and Microscopic; Future  SIMBA (obstructive sleep apnea)  Comments:  Compliant. Continue CPAP. Compliance report requested.  Non compliance w medication regimen  Comments:  Discussed importance of medication compliance, especially regarding thyroid medication.    Time Based Billing:  - Prep Time on Date of Patient Encounter:  1 minutes  - Time Directly with Patient/Family/Caregiver:   37 minutes  - Documentation Time:   14 minutes    Total Minutes:  52

## 2025-03-28 NOTE — PROGRESS NOTES
Physical Therapy    Physical Therapy Evaluation and Treatment      Patient Name: Юлия Ku  MRN: [id number]  Today's Date: 3/28/2025    Time Entry:   Time Calculation  Start Time: 1015  Stop Time: 1120  Time Calculation (min): 65 min  PT Evaluation Time Entry  PT Evaluation (Complex) Time Entry: 45  PT Therapeutic Procedures Time Entry  Therapeutic Activity Time Entry: 20                 Assessment:  PT Assessment  PT Assessment Results: Decreased strength, Decreased endurance, Decreased mobility, Pain, Impaired balance, Decreased coordination, Impaired judgement, Decreased safety awareness, Decreased cognition  Rehab Prognosis: GoodPT Assessment  PT Assessment Results: Decreased strength, Decreased endurance, Decreased mobility, Pain, Impaired balance, Decreased coordination, Impaired judgement, Decreased safety awareness, Decreased cognition  Rehab Prognosis: Good    Recommend skilled physical therapy to address the above deficits that affect functional activities of daily living.   Skilled intervention is needed to train and provide proper technique, educate patient on progression, risks, prognosis, and provide adequate feedback for technique correction and implementation.     Discharge planned upon achievement of goals or reaching maximum benefit from skilled physical therapy services.    Plan:  OP PT Plan  Treatment/Interventions: Education/ Instruction, Manual therapy, Neuromuscular re-education, Therapeutic activities, Therapeutic exercises, Gait training  PT Plan: Skilled PT  PT Frequency: 2 times per week  Duration: 8 weeks  Certification Period Start Date: 03/28/25  Certification Period End Date: 05/23/25  Number of Treatments Authorized: medical necessity  Rehab Potential: Good  Plan of Care Agreement: Patient    Current Problem:   1. Shuffling gait  Follow Up In Physical Therapy      2. Parkinson's disease without dyskinesia, with fluctuating manifestations  Referral to Physical Therapy    Follow Up  "In Physical Therapy      3. Impairment of balance  Follow Up In Physical Therapy      4. Bilateral leg weakness  Follow Up In Physical Therapy        Subjective    General:  General  Reason for Referral: Parkinson's disease, freezing gait  Referred By: Dr. Aguilar  Past Medical History Relevant to Rehab: Long COVID, HTN, seizure disorder  Family/Caregiver Present: Yes  Caregiver Feedback: Pt's  provides bulk of history  Preferred Learning Style: verbal  Pt presents w/her  reporting of having a 2-year hx of increased imbalance, brain fog, freezing gait, frequent falls. States she had PT twice in the past, but it didn't do very well and she wouldn't do her HEP. Pt states she doesn't walk much at home and most mobility is performed in the WC with her  pushing her around. Pt's  states she isn't allowed in the kitchen to cook, as she falls. Pt's  is primary historian and states that pt sits at kitchen table for meals, then he walks her to the recliner, which is where she stays for the day other than to go to the bathroom every hour. States that as the day goes on her walking and balance gets worse and he will use the WC to transport to/from the bathroom. Reports she is up every 2 hours at night to use the toilet, but with problems emptying her bladder. Pt with frequent falls, often onto her knees and then rolls onto her buttocks. Usually needs assist to get up from the floor. Bed mobility Mod Indep per pt's report. Grab bar on the wall and also rails w/elevated toilet seat in the bathroom and pt states she toilets Indep.  reports he assists with ADL transfers into walk-in shower onto stool and out. He also walks behind her put the stairs \"pushing\" her forward to prevent falls.    Precautions:  Precautions  STEADI Fall Risk Score (The score of 4 or more indicates an increased risk of falling): 10  Precautions Comment: high fall risk    Pain:  Pain Assessment  Pain Assessment: " 0-10  0-10 (Numeric) Pain Score: 1  Pain Type: Chronic pain  Pain Radiating Towards: Whole body fatigue, heaviness  Pain Descriptors: Aching, Heaviness    Home Living:  Home Living  Home Living Comment: Pt lives w/ in a 2SH w/3-4 EMILY no HR; HR upstairs; walk-in shower w/stool    Prior Level of Function:  Prior Function Per Pt/Caregiver Report  Level of [place]: Independent with ADLs and functional transfers, Needs assistance with ADLs, Needs assistance with homemaking  Receives Help From: Family    Objective   Cognition:   Brain fog and memory deficits since COVID    UE Strength:  MMT 5/5 max  LEFT RIGHT   Sh Flexion 4+ 4+   Sh Abduction 4+,5 4+,5   Elbow Flex 4+ 4+   Elbow Ext 4+ 4+     LE Strength:   Measured with seated MMT'ing, grossly WNL's with exception of those listed below    Right Left   Hip:       Flexion 4 4   Abduction 4 4   Adduction 4,4+ 4,4+        Knee:     Extension 4 4   Flexion 4-,4 4        Ankle:       DF 4  4   PF  5  5       Coordination: Heel tapping together and alt: slight difficulty  Heel-mcdaniel WFL Talib    Outcome Measures:  Jain Balance Scale  1. Sitting to Standing: Able to stand without using hands and stabilize independently  2. Standing Unsupported: Able to stand 2 minutes with supervision  3. Sitting with Back Unsupported but Feet Supported on Floor or on a Stool: Able to sit safely and securely for 2 minutes  4. Standing to Sitting: Controls descent by using hands  5.  Transfers: Needs one person to assist  6. Standing Unsupported with Eyes Closed: Able to stand 10 seconds with supervision  7. Standing Unsupported with Feet Together: Able to place feet together independently and stand 1 minute with supervision  8. Reach Forward with Outstretched Arm While Standing: Can reach forward 12 cm (5 inches)  9.  Object from Floor from a Standing Position: Able to  slipper but needs supervision  10. Turning to Look Behind Over Left and Right Shoulders While Standing:  Looks behind from both sides and weight shifts well  11. Turn 360 Degrees: Needs assistance while turning  12. Place Alternate Foot on Step or Stool While Standing Unsupported: Able to complete 4 steps without aid with supervision  13. Standing Unsupported One Foot in Front: Able to take small step independently and hold 30 seconds  14. Standing on One Leg: Tries to lift leg unable to hold 3 seconds but remains standing independently  Jain Balance Score: 36     Other Measures  Activities - Specific Balance Confidence Scale: 14.67    Treatments:  Date  3/28/25          Visit # #1       REPS                 Nustep                // bars amb:  Fwd  Lat  Retro  Tandem  Lat over cones                Step-taps                Gastroc stretch        HS stretch        Sit-stand 5x w/o UE's                Big Walking        Gait training                                           Jain 36/56           DGI             5x Sit to Stand            TUG                                                                                                         HEP  Access Code:   [email or web address]              EDUCATION:  Outpatient Education  Individual(s) Educated: Patient, Spouse  Education Provided: Body Mechanics, Anatomy, POC, Home Exercise Program, Home Safety  Risk and Benefits Discussed with Patient/Caregiver/Other: yes  Patient/Caregiver Demonstrated Understanding: yes  Plan of Care Discussed and Agreed Upon: yes  Patient Response to Education: Patient/Caregiver Verbalized Understanding of Information, Patient/Caregiver Performed Return Demonstration of Exercises/Activities, Patient/Caregiver Asked Appropriate Questions    Goals:  Active       Parkinson's / Long COVID       1) Independent standing home exercise program with 90% teach back capability by the patient to show progression       Start:  03/28/25    Expected End:  04/25/25            2) Improve static balance Jain score >6 points to reduce her fall risk with standing ADL's        Start:  03/28/25    Expected End:  05/23/25            3) Improve MMT of Talib LE's to >/=4+/5 to improve joint stability with sit-stand transfers w/o UE WB or external assist       Start:  03/28/25    Expected End:  05/23/25            4) Functional Outcome ABC score improves to >/=40% confidence to reduce her FOF       Start:  03/28/25    Expected End:  05/23/25            5) Pt to amb >150ft w/least restrictive device Mod Indep-Sup with >50% decreased freezing occurrences       Start:  03/28/25    Expected End:  05/23/25

## 2025-03-28 NOTE — PATIENT COMMUNICATION
Please offer patient an appointment at 12:30 today. Needs to come in office so I can do urine testing. Testing through lab will delay treatment.    no

## 2025-03-31 ENCOUNTER — TELEPHONE (OUTPATIENT)
Dept: PRIMARY CARE | Facility: CLINIC | Age: 70
End: 2025-03-31
Payer: MEDICARE

## 2025-03-31 PROBLEM — G20.A2 PARKINSON'S DISEASE WITHOUT DYSKINESIA, WITH FLUCTUATING MANIFESTATIONS: Status: ACTIVE | Noted: 2025-03-31

## 2025-03-31 PROBLEM — R29.898 BILATERAL LEG WEAKNESS: Status: ACTIVE | Noted: 2025-03-31

## 2025-03-31 ASSESSMENT — PAIN DESCRIPTION - DESCRIPTORS: DESCRIPTORS: ACHING;HEAVINESS

## 2025-03-31 ASSESSMENT — PAIN - FUNCTIONAL ASSESSMENT: PAIN_FUNCTIONAL_ASSESSMENT: 0-10

## 2025-03-31 ASSESSMENT — PAIN SCALES - GENERAL: PAINLEVEL_OUTOF10: 1

## 2025-03-31 NOTE — TELEPHONE ENCOUNTER
----- Message from Bibiana Dupree sent at 3/28/2025  1:08 PM EDT -----  Regarding: CPAP compliance  Please request CPAP compliance from [employer]

## 2025-04-01 ENCOUNTER — TREATMENT (OUTPATIENT)
Dept: PHYSICAL THERAPY | Facility: HOSPITAL | Age: 70
End: 2025-04-01
Payer: MEDICARE

## 2025-04-01 ENCOUNTER — PATIENT MESSAGE (OUTPATIENT)
Dept: PRIMARY CARE | Facility: CLINIC | Age: 70
End: 2025-04-01
Payer: MEDICARE

## 2025-04-01 DIAGNOSIS — R26.89 SHUFFLING GAIT: ICD-10-CM

## 2025-04-01 DIAGNOSIS — G20.A2 PARKINSON'S DISEASE WITHOUT DYSKINESIA, WITH FLUCTUATING MANIFESTATIONS: ICD-10-CM

## 2025-04-01 DIAGNOSIS — R29.898 BILATERAL LEG WEAKNESS: ICD-10-CM

## 2025-04-01 DIAGNOSIS — R26.89 IMPAIRMENT OF BALANCE: ICD-10-CM

## 2025-04-01 PROCEDURE — 97110 THERAPEUTIC EXERCISES: CPT | Mod: GP | Performed by: PHYSICAL THERAPIST

## 2025-04-01 PROCEDURE — 97112 NEUROMUSCULAR REEDUCATION: CPT | Mod: GP | Performed by: PHYSICAL THERAPIST

## 2025-04-01 ASSESSMENT — PAIN SCALES - GENERAL: PAINLEVEL_OUTOF10: 0 - NO PAIN

## 2025-04-01 ASSESSMENT — PAIN - FUNCTIONAL ASSESSMENT: PAIN_FUNCTIONAL_ASSESSMENT: 0-10

## 2025-04-01 NOTE — PROGRESS NOTES
Physical Therapy    Physical Therapy Treatment      Patient Name: Юлия Ku  MRN: [id number]  Today's Date: 4/1/2025    Time Entry:   Time Calculation  Start Time: 0935  Stop Time: 1030  Time Calculation (min): 55 min     PT Therapeutic Procedures Time Entry  Neuromuscular Re-Education Time Entry: 47  Therapeutic Exercise Time Entry: 8                 Assessment:    Pt tolerated exercises, WC education and gait training well today, but with frequent rest breaks and max verbal, visual and tactile cues required. Increased cues to slow down exercise speed to maximize proper form and technique. Reviewed seated HEP and pt's  states his AMARILIS comes to do this with her usually daily. Pt is able to correct her step length into larger steps, which sig reduces the festinating, but has difficulty with directional changes, doorways, julee transitions and with any distraction as common with Parkinson's disease. She denied any pain during treatment.    Plan:   Continue to increase pt's step length and reduce festinating gait    Current Problem:   1. Parkinson's disease without dyskinesia, with fluctuating manifestations  Follow Up In Physical Therapy      2. Shuffling gait  Follow Up In Physical Therapy      3. Impairment of balance  Follow Up In Physical Therapy      4. Bilateral leg weakness  Follow Up In Physical Therapy        Subjective    General:     Pt presents in WC with  and states she is extremely fatigued today. She denies any pain, but just some whole body aching. Pt's  reports 1 fall since last visit due to pt losing her balance forward getting out of the WC, but  was able to catch her mostly. She reports feeling 100% brain fog today.    Precautions:       Pain:       Objective   -Initiated standing LE strength & flexibility exercises  -Initiated balance challenges and progressive gait training w/FWW vs RWW  -Reviewed seated HEP    Outcome Measures:           Treatments:  Date   "3/28/25 4/1/25        Visit # #1 #2      REPS                 Nustep  L1 x8 min              // bars amb:  Fwd  Lat  Retro  Tandem  Lat over cones  Talib UE WB  2 laps  2 laps                Step-taps                Gastroc stretch  30\"x3       HS stretch  30\"x2 ea      Sit-stand 5x w/o UE's                Big Walking  x      Gait training  FWW vs RWW      Sit-stand  6x                                 Jain 36/56           DGI             5x Sit to Stand            TUG                         // bars: LE ex  HR  Marching  Hip abd  Hip ext  HS curls     1x10  1x10 ea  1x10 ea    1x10 ea                     WC training   Self-propulsion w/Talib LE's 25ft x2        Car transfer   Standing step up into SUV CGA max VC's                                     HEP  Access Code:   [email or web address]  Reviewed           EDUCATION:       Goals:  Active       Parkinson's / Long COVID       1) Independent standing home exercise program with 90% teach back capability by the patient to show progression (Progressing)       Start:  03/28/25    Expected End:  04/25/25            2) Improve static balance Jain score >6 points to reduce her fall risk with standing ADL's       Start:  03/28/25    Expected End:  05/23/25            3) Improve MMT of Talib LE's to >/=4+/5 to improve joint stability with sit-stand transfers w/o UE WB or external assist       Start:  03/28/25    Expected End:  05/23/25            4) Functional Outcome ABC score improves to >/=40% confidence to reduce her FOF       Start:  03/28/25    Expected End:  05/23/25            5) Pt to amb >150ft w/least restrictive device Mod Indep-Sup with >50% decreased freezing occurrences       Start:  03/28/25    Expected End:  05/23/25                      "

## 2025-04-02 LAB
APPEARANCE UR: CLEAR
BACTERIA #/AREA URNS HPF: ABNORMAL /HPF
BACTERIA UR CULT: ABNORMAL
BACTERIA UR CULT: ABNORMAL
BILIRUB UR QL STRIP: NEGATIVE
COLOR UR: YELLOW
GLUCOSE UR QL STRIP: NEGATIVE
HGB UR QL STRIP: NEGATIVE
HYALINE CASTS #/AREA URNS LPF: ABNORMAL /LPF
KETONES UR QL STRIP: ABNORMAL
LEUKOCYTE ESTERASE UR QL STRIP: ABNORMAL
NITRITE UR QL STRIP: NEGATIVE
PH UR STRIP: 6 [PH] (ref 5–8)
PROT UR QL STRIP: NEGATIVE
RBC #/AREA URNS HPF: ABNORMAL /HPF
SERVICE CMNT-IMP: ABNORMAL
SP GR UR STRIP: 1.01 (ref 1–1.03)
SQUAMOUS #/AREA URNS HPF: ABNORMAL /HPF
WBC #/AREA URNS HPF: ABNORMAL /HPF

## 2025-04-04 ENCOUNTER — TREATMENT (OUTPATIENT)
Dept: PHYSICAL THERAPY | Facility: HOSPITAL | Age: 70
End: 2025-04-04
Payer: MEDICARE

## 2025-04-04 DIAGNOSIS — R26.89 SHUFFLING GAIT: ICD-10-CM

## 2025-04-04 DIAGNOSIS — G20.A2 PARKINSON'S DISEASE WITHOUT DYSKINESIA, WITH FLUCTUATING MANIFESTATIONS: ICD-10-CM

## 2025-04-04 DIAGNOSIS — R26.89 IMPAIRMENT OF BALANCE: ICD-10-CM

## 2025-04-04 DIAGNOSIS — R29.898 BILATERAL LEG WEAKNESS: ICD-10-CM

## 2025-04-04 PROCEDURE — 97110 THERAPEUTIC EXERCISES: CPT | Mod: GP | Performed by: PHYSICAL THERAPIST

## 2025-04-04 PROCEDURE — 97112 NEUROMUSCULAR REEDUCATION: CPT | Mod: GP | Performed by: PHYSICAL THERAPIST

## 2025-04-04 ASSESSMENT — PAIN SCALES - GENERAL: PAINLEVEL_OUTOF10: 0 - NO PAIN

## 2025-04-04 ASSESSMENT — PAIN - FUNCTIONAL ASSESSMENT: PAIN_FUNCTIONAL_ASSESSMENT: 0-10

## 2025-04-04 NOTE — PROGRESS NOTES
"Physical Therapy    Physical Therapy Treatment      Patient Name: Юлия Ku  MRN: [id number]  Today's Date: 4/4/2025    Time Entry:   Time Calculation  Start Time: 1300  Stop Time: 1344  Time Calculation (min): 44 min     PT Therapeutic Procedures Time Entry  Neuromuscular Re-Education Time Entry: 30  Therapeutic Exercise Time Entry: 14                 Assessment:    Pt continues to automatically respond saying I don't know to any question on proper technique for WC safety, sit-stand transfers or gait, but is able to answer with further questioning and some cues needed. More difficulty achieving big step length Talib today as pt was easily distracted within the busy clinic. Continued decreased safety awareness with WC safety, gait pattern and maneuvering the FWW. Pt states she feels more comfortable with the FWW vs RWW. Increased freezing noted with turning and walking into the larger bathroom w/ and FWW. Car transfer performed well again today, pt stepping up into SUV with ease.    Plan:   Continue to improve pt's functional safety and movement capabilities    Current Problem:   1. Parkinson's disease without dyskinesia, with fluctuating manifestations  Follow Up In Physical Therapy      2. Shuffling gait  Follow Up In Physical Therapy      3. Impairment of balance  Follow Up In Physical Therapy      4. Bilateral leg weakness  Follow Up In Physical Therapy        Subjective    General:     Pt and  present in WC with reports of being really bad balance-wise today and had difficulty getting out of the house. She denies any pain today.  denies any actual falls, but that he \"had to catch her a few times\" when walking due to freezing.    Precautions:       Pain:       Objective   -increased exercise repetitions  -increased gait distances    Outcome Measures:        Other Measures  5x Sit to Stand: 18.14 seconds  Treatments:  Date  3/28/25 4/1/25 4/4/25      Visit # #1 #2 #3     REPS               " "  Nustep  L1 x8 min              // bars amb:  Fwd  Lat  Retro  Tandem  Lat over cones  Talib UE WB  2 laps  2 laps   1 UE WB  2 laps & march  2 laps 2UE WB             Step-taps                Gastroc stretch  30\"x3  30\"x3     HS stretch  30\"x2 ea 30\"x2 ea                      Big Gait training  FWW vs RWW FWW BIG  60ft x1, 130ft x1, 100ft     Sit-stand 5x w/o UE's 6x 5x                Stairs     up/down 4 steps 1x Talib HR reciprocally       Jain 36/56           DGI             5x Sit to Stand     18.14 seconds      TUG                         // bars: LE ex  HR  Marching  Hip abd  Hip ext  HS curls     1x10  1x10 ea  1x10 ea    1x10 ea   20x    1x10 ea  1x10 ea  1x12 ea                   WC training   Self-propulsion w/Talib LE's 25ft x2        Car transfer   Standing step up into SUV CGA max VC's Standing step up into SUV CGA max VC's                                   HEP  Access Code:   [email or web address]  Reviewed           EDUCATION:   Max education for pt and  to stop amb immediately when freezing begins, regroup and restart taking big steps \"over rocks\".    Goals:  Active       Parkinson's / Long COVID       1) Independent standing home exercise program with 90% teach back capability by the patient to show progression (Progressing)       Start:  03/28/25    Expected End:  04/25/25            2) Improve static balance Jain score >6 points to reduce her fall risk with standing ADL's       Start:  03/28/25    Expected End:  05/23/25            3) Improve MMT of Talib LE's to >/=4+/5 to improve joint stability with sit-stand transfers w/o UE WB or external assist       Start:  03/28/25    Expected End:  05/23/25            4) Functional Outcome ABC score improves to >/=40% confidence to reduce her FOF       Start:  03/28/25    Expected End:  05/23/25            5) Pt to amb >150ft w/least restrictive device Mod Indep-Sup with >50% decreased freezing occurrences       Start:  03/28/25    Expected End:  05/23/25          "

## 2025-04-08 ENCOUNTER — TREATMENT (OUTPATIENT)
Dept: PHYSICAL THERAPY | Facility: HOSPITAL | Age: 70
End: 2025-04-08
Payer: MEDICARE

## 2025-04-08 DIAGNOSIS — R29.898 BILATERAL LEG WEAKNESS: ICD-10-CM

## 2025-04-08 DIAGNOSIS — R26.89 SHUFFLING GAIT: ICD-10-CM

## 2025-04-08 DIAGNOSIS — G20.A2 PARKINSON'S DISEASE WITHOUT DYSKINESIA, WITH FLUCTUATING MANIFESTATIONS: ICD-10-CM

## 2025-04-08 DIAGNOSIS — R26.89 IMPAIRMENT OF BALANCE: ICD-10-CM

## 2025-04-08 PROCEDURE — 97110 THERAPEUTIC EXERCISES: CPT | Mod: GP,CQ

## 2025-04-08 ASSESSMENT — PAIN - FUNCTIONAL ASSESSMENT: PAIN_FUNCTIONAL_ASSESSMENT: 0-10

## 2025-04-08 ASSESSMENT — PAIN SCALES - GENERAL: PAINLEVEL_OUTOF10: 0 - NO PAIN

## 2025-04-08 NOTE — PROGRESS NOTES
Physical Therapy    Physical Therapy Treatment    Patient Name: Юлия Ku  MRN: [id number]  : 1955  Today's Date: 2025  Time Calculation  Start Time: 1300  Stop Time: 1348  Time Calculation (min): 48 min    PT Therapeutic Procedures Time Entry  Therapeutic Exercise Time Entry: 48          VISIT:# 4    Current Problem  Problem List Items Addressed This Visit             ICD-10-CM    Impairment of balance R26.89    Shuffling gait R26.89    Parkinson's disease without dyskinesia, with fluctuating manifestations G20.A2    Bilateral leg weakness R29.898        Subjective  No falls reported.   waited in the waiting room, sister present during session   Reason for Referral: Parkinson's disease, freezing gait  Referred By: Dr. Aguilar  Past Medical History Relevant to Rehab: Long COVID, HTN, seizure disorder  Family/Caregiver Present: Yes (sister)     Pain  Pain Assessment: 0-10  0-10 (Numeric) Pain Score: 0 - No pain       Objective    Access Code: [email or web address]  URL: [email or web address]  Date: 2025  Prepared by: Marshall Clark    Exercises  - Standing Hip Abduction with Counter Support  - 1-2 x daily - 5-7 x weekly - 3 sets - 10 reps  - Standing Hip Extension with Counter Support  - 1-2 x daily - 5-7 x weekly - 3 sets - 10 reps  - Standing March with Counter Support  - 1-2 x daily - 5-7 x weekly - 3 sets - 10 reps  - Standing Knee Flexion  - 1-2 x daily - 5-7 x weekly - 3 sets - 10 reps  - Heel Raises with Counter Support  - 1-2 x daily - 5-7 x weekly - 3 sets - 10 reps             Precautions  Precautions  STEADI Fall Risk Score (The score of 4 or more indicates an increased risk of falling): 10  Precautions Comment: high fall risk      Treatments:     Date  3/28/25 4/1/25 4/4/25  2025     Visit # #1 #2 #3 #4    REPS                 Nustep  L1 x8 min  10' @L1            // bars amb:  Fwd  Lat  Retro  Tandem  Lat over cones  Talib UE WB  2 laps  2 laps   1 UE WB  2  "laps & march  2 laps 2UE WB             Step-taps                Gastroc stretch  30\"x3  30\"x3     HS stretch  30\"x2 ea 30\"x2 ea                      Big Gait training  FWW vs RWW FWW BIG  60ft x1, 130ft x1, 100ft FWW BIG   80' x , 60' x 1    Sit-stand 5x w/o UE's 6x 5x Many times               Stairs     up/down 4 steps 1x Talib HR reciprocally       Jain 36/56           DGI             5x Sit to Stand     18.14 seconds      TUG                         // bars: LE ex  HR  Marching  Hip abd  Hip ext  HS curls     1x10  1x10 ea  1x10 ea    1x10 ea   20x    1x10 ea  1x10 ea  1x12 ea At sink  10 x 2   10 x 2   10 x 2   10 x 2  10 x 2                   WC training   Self-propulsion w/Talib LE's 25ft x2        Car transfer   Standing step up into SUV CGA max VC's Standing step up into SUV CGA max VC's                Floor transfer x2 w/ mat assist                   HEP  Access Code:   [email or web address]  Reviewed    Access Code: [email or web address]                           Assessment:   Pt's HEP was discussed with pt and her sister,  was given paperwork.  Discussed with family to try to have pt get up and walk several times a day to the bathroom vs taking in w/c.  Pt required cues with hand placement for sit to stand, to take big steps with walking and for floor transfer.  POC was discussed with PT Judy   Outpatient Shanthi  Individual(s) Educated: Patient  Education Provided: Home Exercise Program  Risk and Benefits Discussed with Patient/Caregiver/Other: yes  Patient/Caregiver Demonstrated Understanding: yes  Plan of Care Discussed and Agreed Upon: yes  Patient Response to Education: Patient/Caregiver Verbalized Understanding of Information, Patient/Caregiver Performed Return Demonstration of Exercises/Activities, Patient/Caregiver Asked Appropriate Questions  Education Comment: Access Code: [email or web address]    Plan: cont to improve functional mobility   OP PT Plan  Treatment/Interventions: Education/ Instruction, Manual therapy, " Neuromuscular re-education, Therapeutic activities, Therapeutic exercises, Gait training  PT Plan: Skilled PT  PT Frequency: 2 times per week  Duration: 8 weeks  Certification Period Start Date: 03/28/25  Certification Period End Date: 05/23/25  Number of Treatments Authorized: medical necessity  Rehab Potential: Good  Plan of Care Agreement: Patient    Goals:  Active       Parkinson's / Long COVID       1) Independent standing home exercise program with 90% teach back capability by the patient to show progression (Progressing)       Start:  03/28/25    Expected End:  04/25/25            2) Improve static balance Jain score >6 points to reduce her fall risk with standing ADL's       Start:  03/28/25    Expected End:  05/23/25            3) Improve MMT of Talib LE's to >/=4+/5 to improve joint stability with sit-stand transfers w/o UE WB or external assist       Start:  03/28/25    Expected End:  05/23/25            4) Functional Outcome ABC score improves to >/=40% confidence to reduce her FOF       Start:  03/28/25    Expected End:  05/23/25            5) Pt to amb >150ft w/least restrictive device Mod Indep-Sup with >50% decreased freezing occurrences       Start:  03/28/25    Expected End:  05/23/25

## 2025-04-09 ENCOUNTER — APPOINTMENT (OUTPATIENT)
Dept: INTEGRATIVE MEDICINE | Facility: CLINIC | Age: 70
End: 2025-04-09
Payer: MEDICARE

## 2025-04-09 VITALS
SYSTOLIC BLOOD PRESSURE: 131 MMHG | DIASTOLIC BLOOD PRESSURE: 77 MMHG | BODY MASS INDEX: 29.25 KG/M2 | HEART RATE: 93 BPM | WEIGHT: 170.4 LBS

## 2025-04-09 DIAGNOSIS — R26.89 IMPAIRMENT OF BALANCE: ICD-10-CM

## 2025-04-09 DIAGNOSIS — E89.0 POSTABLATIVE HYPOTHYROIDISM: Primary | Chronic | ICD-10-CM

## 2025-04-09 DIAGNOSIS — R41.89 BRAIN FOG: ICD-10-CM

## 2025-04-09 DIAGNOSIS — G47.33 OSA (OBSTRUCTIVE SLEEP APNEA): ICD-10-CM

## 2025-04-09 DIAGNOSIS — U09.9 POST-ACUTE SEQUELAE OF COVID-19 (PASC): ICD-10-CM

## 2025-04-09 PROCEDURE — 99215 OFFICE O/P EST HI 40 MIN: CPT | Performed by: INTERNAL MEDICINE

## 2025-04-09 PROCEDURE — G2211 COMPLEX E/M VISIT ADD ON: HCPCS | Performed by: INTERNAL MEDICINE

## 2025-04-09 ASSESSMENT — ENCOUNTER SYMPTOMS
CONFUSION: 1
ACTIVITY CHANGE: 1
DECREASED CONCENTRATION: 1
ABDOMINAL PAIN: 0
SLEEP DISTURBANCE: 1
NAUSEA: 0
NECK PAIN: 0
FATIGUE: 1
DIZZINESS: 1
NECK STIFFNESS: 0
ABDOMINAL DISTENTION: 0
UNEXPECTED WEIGHT CHANGE: 1
DIARRHEA: 0
NERVOUS/ANXIOUS: 1
CONSTIPATION: 1

## 2025-04-09 NOTE — ASSESSMENT & PLAN NOTE
Must work with pcp and or me to get tsh into the normal range. Definitely plays a role in fatigue, brain fog, constipation.

## 2025-04-09 NOTE — ASSESSMENT & PLAN NOTE
Honestly not sure that her symptoms are from covid because  says issues began about a year after covid. Continue care with neurology . Encouraged exercise daily given the parkinsonims.

## 2025-04-09 NOTE — PATIENT INSTRUCTIONS
Stay on the tirosint 50 mcg daily for five weeks and recheck the tsh in five weeks. We can then modify the dose based on the lab.   Start vitamin d 4000 International Units by mouth daily. We can check her level later.   Bring your supplements to the next visit and show me what you take.   Vitamin b12 1000 mcg as a sublingual tablet ([employer] makes one called quick dissolve) and take in the morning.   Try ground flax seed daily  start with 1 teaspoon per day and increase to 2 tablespoons per day. Test for urine iodine levels for the dry mouth.   Dr. Junior MAYENOMBS- greens, beans/lentils, onion (leeks, garlic), mushrooms, berries, s-seeds and nuts.   Decrease tea and coffee for two weeks to see if bladder issues improve.  Make sure she is getting 60 ounces of water .  Keep doing the 30 minutes of stationary bike per day.   Stop drinking ensure.   Follow up 2 months.   Mi Fox MD PhD

## 2025-04-09 NOTE — PROGRESS NOTES
Integrative Medicine Visit:     Subjective   Patient ID: Юлия Ku is a 69 y.o. female who presents for long covid       HPI  Brain fog: she cannot concentrate. Short term memory loss. Dx with parkinsonism- on meds for this. Mom with hx of dementia.   Had covid in 2021. Symptoms came on grdually since the infection. Had GI symptoms. No breathing problems.   Chronic fatigue all day long.      Lab Results   Component Value Date    BTPQDTYT31 850 09/26/2024      Lab Results   Component Value Date    TSH 62.72 (H) 02/04/2025      Lab Results   Component Value Date    VITD25 76 09/26/2024     Dry mouth at all times. Had a biopsy and no one offers any treatment .  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   Recently her thyroid test showed very abnormal tsh. Was started on levothyroxine 50 mcg about ten days ago.  says she is tired at all time and that starting the levothyroxine has made her more tired.   CONCERNS:  issues with brain function, immune system, mobility, urinary frequency, dry mouth which al began gradually after covid infection in 2021.     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     NUTRITION: scrambled eggs with mushrooms, tomatoes, croissant and coffee or oatmeal- butter or pancakes.   Dinner: trout arugula, baked beans and cherry tomatoes and cauliflower  Lunch: does not eat lunch. Protein drink (ensure) with a [employer] frozen hamburger  Typical snacks: sweets- supermarket cookies. No fruit.   She does not like fruit.     Smoking:  non-smoker    Alcohol use:  none    Exercise:   going to parkinson's pt. Two times per week.     SLEEP: has sleep apnea. Wears cpap. Slept 9 hours and still feeels tired. Gets up a lot to urinate. Does not think her energy level is any better since starting cpap.     STRESS MANAGEMENT: unclear  SUPPORT: .     Review of Systems   Constitutional:  Positive for activity change, fatigue and unexpected weight change.   Gastrointestinal:  Positive for constipation. Negative for abdominal distention, abdominal pain, diarrhea and nausea.   Musculoskeletal:  Positive for gait problem. Negative for neck pain and neck stiffness.   Skin:  Negative for rash.   Neurological:  Positive for dizziness.   Psychiatric/Behavioral:  Positive for confusion, decreased concentration and sleep disturbance. Negative for suicidal ideas. The patient is nervous/anxious.             Pain:    Objective   /77 (BP Location: Left arm, Patient Position: Sitting, BP  Cuff Size: Adult)   Pulse 93   Wt 77.3 kg (170 lb 6.4 oz)   BMI 29.25 kg/m²       Physical Exam  Constitutional:       Comments: overweight   HENT:      Head: Normocephalic and atraumatic.      Mouth/Throat:      Mouth: Mucous membranes are moist.   Eyes:      Conjunctiva/sclera: Conjunctivae normal.      Pupils: Pupils are equal, round, and reactive to light.   Cardiovascular:      Rate and Rhythm: Normal rate.   Pulmonary:      Effort: Pulmonary effort is normal. No respiratory distress.   Abdominal:      General: There is no distension.      Palpations: Abdomen is soft.      Tenderness: There is no abdominal tenderness.   Musculoskeletal:         General: No swelling or deformity.      Cervical back: Normal range of motion.   Skin:     General: Skin is dry.      Findings: No rash.   Neurological:      Mental Status: She is alert and oriented to person, place, and time.      Motor: Weakness present.      Gait: Gait abnormal.      Comments: Needs assistance to get out of the wheel chair onto the table and has extreme trouble with balance.    Psychiatric:         Mood and Affect: Mood normal.         Thought Content: Thought content normal.      Comments: Normal affect                      Assessment/Plan     Problem List Items Addressed This Visit             ICD-10-CM    Postablative hypothyroidism - Primary (Chronic) E89.0     Must work with pcp and or me to get tsh into the normal range. Definitely plays a role in fatigue, brain fog, constipation.          Relevant Orders    TSH    Thyroxine, Free    QUEST IODINE, RANDOM URINE    Post-acute sequelae of COVID-19 (PASC) U09.9     Honestly not sure that her symptoms are from covid because  says issues began about a year after covid. Continue care with neurology . Encouraged exercise daily given the parkinsonims.          Impairment of balance R26.89     Would like to reassess her after she takes synthroid and tsh can return to normal range.          Brain  fog R41.89    Relevant Orders    TSH    Thyroxine, Free    SIMBA (obstructive sleep apnea) G47.33     Encouraged them to continue this treatment with cpap.           Stay on the tirosint 50 mcg daily for five weeks and recheck the tsh in five weeks. We can then modify the dose based on the lab.   Start vitamin d 4000 International Units by mouth daily. We can check her level later.   Bring your supplements to the next visit and show me what you take.   Vitamin b12 1000 mcg as a sublingual tablet (nature's bounty makes one called quick dissolve) and take in the morning.   Try ground flax seed daily  start with 1 teaspoon per day and increase to 2 tablespoons per day. Test for urine iodine levels for the dry mouth.   Dr. Junior Ventura   GBOMBS- greens, beans/lentils, onion (leeks, garlic), mushrooms, berries, s-seeds and nuts.   Decrease tea and coffee for two weeks to see if bladder issues improve.  Make sure she is getting 60 ounces of water .  Keep doing the 30 minutes of stationary bike per day.   Stop drinking ensure.    Dry mouth: increase omega three fats in the diet. Check urine iodine.   Increase nutrition per calorie in hte diet.     Recommend Follow up in : 2 months.     Mi Fox MD PhD    Time Spent  Prep time on day of patient encounter: 5 minutes  Time spent directly with patient, family or caregiver: 55 minutes  Additional Time Spent on Patient Care Activities: 0 minutes  Documentation Time: 5 minutes  Other Time Spent: 0 minutes  Total: 65 minutes

## 2025-04-10 ENCOUNTER — TREATMENT (OUTPATIENT)
Dept: PHYSICAL THERAPY | Facility: HOSPITAL | Age: 70
End: 2025-04-10
Payer: MEDICARE

## 2025-04-10 DIAGNOSIS — R29.898 BILATERAL LEG WEAKNESS: ICD-10-CM

## 2025-04-10 DIAGNOSIS — R26.89 SHUFFLING GAIT: ICD-10-CM

## 2025-04-10 DIAGNOSIS — G20.A2 PARKINSON'S DISEASE WITHOUT DYSKINESIA, WITH FLUCTUATING MANIFESTATIONS: ICD-10-CM

## 2025-04-10 DIAGNOSIS — R26.89 IMPAIRMENT OF BALANCE: ICD-10-CM

## 2025-04-10 PROCEDURE — 97112 NEUROMUSCULAR REEDUCATION: CPT | Mod: GP,CQ

## 2025-04-10 PROCEDURE — 97110 THERAPEUTIC EXERCISES: CPT | Mod: GP,CQ

## 2025-04-10 ASSESSMENT — PAIN - FUNCTIONAL ASSESSMENT: PAIN_FUNCTIONAL_ASSESSMENT: 0-10

## 2025-04-10 ASSESSMENT — PAIN SCALES - GENERAL: PAINLEVEL_OUTOF10: 0 - NO PAIN

## 2025-04-10 NOTE — PROGRESS NOTES
"Physical Therapy    Physical Therapy Treatment    Patient Name: Юлия Ku  MRN: [id number]  : 1955  Today's Date: 4/10/2025  Time Calculation  Start Time: 1345  Stop Time: 1430  Time Calculation (min): 45 min    PT Therapeutic Procedures Time Entry  Neuromuscular Re-Education Time Entry: 35  Therapeutic Exercise Time Entry: 10          VISIT:# 5    Current Problem  Problem List Items Addressed This Visit             ICD-10-CM    Impairment of balance R26.89    Shuffling gait R26.89    Parkinson's disease without dyskinesia, with fluctuating manifestations G20.A2    Bilateral leg weakness R29.898        Subjective    Pt's  reports pt is doing much at home.  She is stubborn and doesn't want to play games on her phone anymore.  Pt reports the brain fog is really bad and she wishes she could get rid of it.       Pain  Pain Assessment: 0-10  0-10 (Numeric) Pain Score: 0 - No pain       Objective   Other Measures  5x Sit to Stand: 19 sec             Precautions  Precautions  STEADI Fall Risk Score (The score of 4 or more indicates an increased risk of falling): unchanged  Precautions Comment: high fall risk      Treatments:     Date  3/28/25 4/1/25 4/4/25  2025  4/10/2025    Visit # #1 #2 #3 #4 #5   REPS                 Nustep  L1 x8 min  10' @L1 10' @L2           // bars amb:  Fwd  Lat  Retro  Tandem  Lat over cones  Talib UE WB  2 laps  2 laps   1 UE WB  2 laps & march  2 laps 2UE WB  Talib UE  2 laps march  2 laps  1 laps    2 laps fwd/ side           Step-taps                Gastroc stretch  30\"x3  30\"x3     HS stretch  30\"x2 ea 30\"x2 ea                      Big Gait training  FWW vs RWW FWW BIG  60ft x1, 130ft x1, 100ft FWW BIG   80' x , 60' x 1 80' x 2, 20' x 1   FWW CGA    Turns     6-8x  big steps   Sit-stand 5x w/o UE's 6x 5x Many times               Stairs     up/down 4 steps 1x Talib HR reciprocally       Jain 36/56           DGI             5x Sit to Stand     18.14 seconds   19 sec   TUG     "                     // bars: LE ex  HR  Marching  Hip abd  Hip ext  HS curls     1x10  1x10 ea  1x10 ea    1x10 ea   20x    1x10 ea  1x10 ea  1x12 ea At sink  10 x 2   10 x 2   10 x 2   10 x 2  10 x 2                   WC training   Self-propulsion w/Talib LE's 25ft x2        Car transfer   Standing step up into SUV CGA max VC's Standing step up into SUV CGA max VC's                Floor transfer x2 w/ mat assist                   HEP  Access Code:   [email or web address]  Reviewed    Access Code: [email or web address]                           Assessment:        Pt tolerated treatment without complaint of increase in symptoms.  Cues required to take big steps in all directions.  Pt did better with her hand placement for sit to stand transitions.   No LOB.      Plan: Cont to improve functional mobility to decrease risk of falls.   OP PT Plan  Treatment/Interventions: Education/ Instruction, Manual therapy, Neuromuscular re-education, Therapeutic activities, Therapeutic exercises, Gait training  PT Plan: Skilled PT  PT Frequency: 2 times per week  Duration: 8 weeks  Certification Period Start Date: 03/28/25  Certification Period End Date: 05/23/25  Number of Treatments Authorized: medical necessity  Rehab Potential: Good  Plan of Care Agreement: Patient    Goals:  Active       Parkinson's / Long COVID       1) Independent standing home exercise program with 90% teach back capability by the patient to show progression (Progressing)       Start:  03/28/25    Expected End:  04/25/25            2) Improve static balance Jain score >6 points to reduce her fall risk with standing ADL's       Start:  03/28/25    Expected End:  05/23/25            3) Improve MMT of Talib LE's to >/=4+/5 to improve joint stability with sit-stand transfers w/o UE WB or external assist       Start:  03/28/25    Expected End:  05/23/25            4) Functional Outcome ABC score improves to >/=40% confidence to reduce her FOF       Start:  03/28/25    Expected End:  05/23/25             5) Pt to amb >150ft w/least restrictive device Mod Indep-Sup with >50% decreased freezing occurrences       Start:  03/28/25    Expected End:  05/23/25

## 2025-04-11 LAB
T4 FREE SERPL-MCNC: 0.8 NG/DL (ref 0.8–1.8)
TSH SERPL-ACNC: 53.66 MIU/L (ref 0.4–4.5)

## 2025-04-13 LAB — IODINE UR-MCNC: 221 MCG/L (ref 34–523)

## 2025-04-15 ENCOUNTER — TREATMENT (OUTPATIENT)
Dept: PHYSICAL THERAPY | Facility: HOSPITAL | Age: 70
End: 2025-04-15
Payer: MEDICARE

## 2025-04-15 DIAGNOSIS — R29.898 BILATERAL LEG WEAKNESS: ICD-10-CM

## 2025-04-15 DIAGNOSIS — G20.A2 PARKINSON'S DISEASE WITHOUT DYSKINESIA, WITH FLUCTUATING MANIFESTATIONS: ICD-10-CM

## 2025-04-15 DIAGNOSIS — R26.89 IMPAIRMENT OF BALANCE: ICD-10-CM

## 2025-04-15 DIAGNOSIS — R26.89 SHUFFLING GAIT: ICD-10-CM

## 2025-04-15 PROCEDURE — 97110 THERAPEUTIC EXERCISES: CPT | Mod: GP | Performed by: PHYSICAL THERAPIST

## 2025-04-15 PROCEDURE — 97112 NEUROMUSCULAR REEDUCATION: CPT | Mod: GP | Performed by: PHYSICAL THERAPIST

## 2025-04-15 ASSESSMENT — PAIN SCALES - GENERAL: PAINLEVEL_OUTOF10: 0 - NO PAIN

## 2025-04-15 ASSESSMENT — PAIN - FUNCTIONAL ASSESSMENT: PAIN_FUNCTIONAL_ASSESSMENT: 0-10

## 2025-04-15 NOTE — PROGRESS NOTES
"Physical Therapy    Physical Therapy Treatment    Patient Name: Юлия Ku  MRN: [id number]  : 1955  Today's Date: 4/15/2025  Time Calculation  Start Time: 1346  Stop Time: 1446  Time Calculation (min): 60 min    PT Therapeutic Procedures Time Entry  Neuromuscular Re-Education Time Entry: 50  Therapeutic Exercise Time Entry: 10        VISIT:# 6    Current Problem  Problem List Items Addressed This Visit           ICD-10-CM    Impairment of balance R26.89    Shuffling gait R26.89    Parkinson's disease without dyskinesia, with fluctuating manifestations G20.A2    Bilateral leg weakness R29.898      Subjective   Reason for Referral: Parkinson's disease, freezing gait  Referred By: Dr. Aguilar  Past Medical History Relevant to Rehab: Long COVID, HTN, seizure disorder  Family/Caregiver Present: Yes (sister)   Pt reports having 1 almost fall since last visit when walking up the stairs, but her  was behind her and helped to stabilize. She denies any pain today, but her fatigue is still very bad.    Pain  Pain Assessment: 0-10  0-10 (Numeric) Pain Score: 0 - No pain     Objective    Max VC's to WB through whole foot  5x STS 17.55 seconds w/max VC's           Precautions  Precautions  STEADI Fall Risk Score (The score of 4 or more indicates an increased risk of falling): 10  Precautions Comment: high fall risk    Treatments:     Date 4/4/25  2025  4/10/2025  4/15/25   Visit # #3 #4 #5 #6   REPS               Nustep  10' @L1 10' @L2 L2 x10 min          // bars amb:  Fwd  Lat  Retro  Tandem  Lat over cones 1 UE WB  2 laps & march  2 laps 2UE WB  Talib UE  2 laps march  2 laps  1 laps    2 laps fwd/ side Talib UE WB  1 UE 2 laps  2 laps  2 laps    2 laps side/fwd          Step-taps       Foam stance    1 min x1   Gastroc stretch 30\"x3      HS stretch 30\"x2 ea   30\"x3 ea                  Big Gait training FWW BIG  60ft x1, 130ft x1, 100ft FWW BIG   80' x , 60' x 1 80' x 2, 20' x 1   FWW CGA  85ft x1; 100ft x1 " "w/FWW CGA   Turns   6-8x  big steps 10x big steps   Sit-stand 5x Many times              Stairs up/down 4 steps 1x Talib HR reciprocally        Jain          DGI          5x Sit to Stand 18.14 seconds   19 sec 17.55 seconds   TUG                   // bars: LE ex  HR  Marching  Hip abd  Hip ext  HS curls   20x    1x10 ea  1x10 ea  1x12 ea At sink  10 x 2   10 x 2   10 x 2   10 x 2  10 x 2                          Car transfer Standing step up into SUV CGA max VC's     Standing step up into SUV CGA w/max VC's and car handle        Floor transfer x2 w/ mat assist                 HEP     Access Code: [email or web address]                         Assessment:   Pt reported continued increased fatigue, but was able to perform amb in // bars w/o sig LOB. Pt continues to have tendency for retro COG especially with sit>stand transfers. Encouraged her to lean forward and \"dive\" forward out of her chair to help assist with sit-stand transfer w/o occupational health. 5x sit-stand was lower than initially, but it had gone back up recently     Plan:    Continue to increase step length and proper forward weight shifting    Goals:  Active       Parkinson's / Long COVID       1) Independent standing home exercise program with 90% teach back capability by the patient to show progression (Progressing)       Start:  03/28/25    Expected End:  04/25/25            2) Improve static balance Jain score >6 points to reduce her fall risk with standing ADL's       Start:  03/28/25    Expected End:  05/23/25            3) Improve MMT of Talib LE's to >/=4+/5 to improve joint stability with sit-stand transfers w/o UE WB or external assist       Start:  03/28/25    Expected End:  05/23/25            4) Functional Outcome ABC score improves to >/=40% confidence to reduce her FOF       Start:  03/28/25    Expected End:  05/23/25            5) Pt to amb >150ft w/least restrictive device Mod Indep-Sup with >50% decreased freezing occurrences       Start:  03/28/25    " Expected End:  05/23/25

## 2025-04-17 ENCOUNTER — APPOINTMENT (OUTPATIENT)
Dept: ALLERGY | Facility: CLINIC | Age: 70
End: 2025-04-17
Payer: MEDICARE

## 2025-04-17 VITALS
SYSTOLIC BLOOD PRESSURE: 132 MMHG | HEIGHT: 64 IN | DIASTOLIC BLOOD PRESSURE: 78 MMHG | TEMPERATURE: 98.1 F | BODY MASS INDEX: 29.02 KG/M2 | WEIGHT: 170 LBS

## 2025-04-17 DIAGNOSIS — J45.40 MODERATE PERSISTENT ASTHMA, UNSPECIFIED WHETHER COMPLICATED (HHS-HCC): Primary | ICD-10-CM

## 2025-04-17 DIAGNOSIS — D72.19 PERIPHERAL EOSINOPHILIA: ICD-10-CM

## 2025-04-17 DIAGNOSIS — D80.1 HYPOGAMMAGLOBULINEMIA (MULTI): ICD-10-CM

## 2025-04-17 PROCEDURE — 3075F SYST BP GE 130 - 139MM HG: CPT | Performed by: ALLERGY & IMMUNOLOGY

## 2025-04-17 PROCEDURE — 3078F DIAST BP <80 MM HG: CPT | Performed by: ALLERGY & IMMUNOLOGY

## 2025-04-17 PROCEDURE — 3008F BODY MASS INDEX DOCD: CPT | Performed by: ALLERGY & IMMUNOLOGY

## 2025-04-17 PROCEDURE — 99214 OFFICE O/P EST MOD 30 MIN: CPT | Performed by: ALLERGY & IMMUNOLOGY

## 2025-04-17 PROCEDURE — 1159F MED LIST DOCD IN RCRD: CPT | Performed by: ALLERGY & IMMUNOLOGY

## 2025-04-17 PROCEDURE — 4010F ACE/ARB THERAPY RXD/TAKEN: CPT | Performed by: ALLERGY & IMMUNOLOGY

## 2025-04-17 RX ORDER — FLUTICASONE FUROATE AND VILANTEROL 100; 25 UG/1; UG/1
1 POWDER RESPIRATORY (INHALATION) DAILY
Qty: 1 EACH | Refills: 3 | Status: SHIPPED | OUTPATIENT
Start: 2025-04-17

## 2025-04-17 NOTE — PROGRESS NOTES
Patient ID: Юлия Ku is a 70 y.o. female.     Chief Complaint: follow up  History Of Present Illness  Юлия Ku is a 70 y.o. female with PMx hypogammaglobulinemia presenting for follow up.   No infections.  The brain fog has not changed.  Dry mouth.  Stopped her diabetes medication for awhile, but then restarted  Also tried to stop her Thyroid medication.  Now TSH elevated.  Now on a new thyroid medication.    Using Zyrtec 5 mg-makes her too tired so using as needed  Infusing at night and doing her dosing every other week because she is so tired, but thought to be due to her thyroid.  She does have hypothyroid.     History of chronic sclerosing alodentitis.  Did biopsy to confirm.    Food Allergy  No    Eczema/ Atopic Dermatitis  No    Asthma  No    Rhinoconjunctivitis  Dust mite and cat allergy    Drug Allergy   Reviewed in chart    Insect Allergy   No    Infections  No history of frequent or recurrent infections      Review of Systems    Pertinent positives and negatives have been assessed in the HPI. All other systems have been reviewed and are negative except as noted in the HPI.    Allergies  Mold, Codeine, and Lisinopril    Past Medical History  She has a past medical history of Disease of thyroid gland (graves 2008), Fatigue (12/2023), Hypertension (2008), Personal history of irradiation, Personal history of other diseases of the circulatory system, Personal history of other endocrine, nutritional and metabolic disease, Personal history of other endocrine, nutritional and metabolic disease, Personal history of other endocrine, nutritional and metabolic disease, Personal history of other endocrine, nutritional and metabolic disease, and Right ventricular dysfunction (01/31/2024).    Family History  Family History   Problem Relation Name Age of Onset    Alzheimer's disease Mother Sandeep Petra     Hypertension Mother Sandeep Sanderskerrie     Cancer Mother Sandeep Petra     Breast cancer Mother Sandeep Petra      Heart disease Maternal Grandfather nito appiah     Hypertension Father Chichi Lambert     Kidney disease Father Chichi Lambert          Surgical History  She has a past surgical history that includes Other surgical history (01/12/2021); Breast biopsy (5/2008); and Cardiac catheterization.    Social/Environmental History  She reports that she quit smoking about 46 years ago. Her smoking use included cigarettes. She started smoking about 48 years ago. She has a 0.5 pack-year smoking history. She has never used smokeless tobacco. She reports that she does not drink alcohol and does not use drugs.      MEDICATIONS  Current Outpatient Medications on File Prior to Visit   Medication Sig Dispense Refill    carbidopa-levodopa (Sinemet CR)  mg ER tablet Take 1 tab at bedtime 90 tablet 3    carbidopa-levodopa (Sinemet)  mg tablet Take 1.5 tabs  tablet 3    cycloSPORINE (Restasis) 0.05 % ophthalmic emulsion Administer 1 drop into both eyes every 12 hours.      EPINEPHrine 0.3 mg/0.3 mL injection syringe Inject 0.3 mL (0.3 mg) into the muscle 1 time if needed.      fluticasone propion-salmeteroL (Advair HFA) 230-21 mcg/actuation inhaler Inhale 2 puffs 2 times a day. Rinse mouth with water after use to reduce aftertaste and incidence of candidiasis. Do not swallow. 12 g 11    FreeStyle Srinivas 3 Sensor device USE AS DIRECTED TO CHECK BLOOD GLUCOSE. CHANGE EVERY 14 DAYS      losartan (Cozaar) 50 mg tablet Take 1 tablet (50 mg) by mouth once daily. 90 tablet 1    NON FORMULARY Glia Plasmalogens      omega-3 fatty acids (SUPER OMEGA-3 ORAL) Take by mouth.      PreviDent 5000 Dry Mouth 1.1 % dental paste Apply to teeth once daily.      Tirosint 50 mcg capsule Take 1 capsule (50 mcg) by mouth early in the morning.. Take on an empty stomach at the same time each day, either 30 to 60 minutes prior to breakfast 30 capsule 0    pilocarpine (Salagen) 5 mg tablet TAKE 1 TABLET(5 MG) BY MOUTH THREE TIMES DAILY (Patient not  "taking: Reported on 4/17/2025) 270 tablet 1     No current facility-administered medications on file prior to visit.       Physical Exam  Visit Vitals  /78   Temp 36.7 °C (98.1 °F)   Ht 1.613 m (5' 3.5\")   Wt 77.1 kg (170 lb)   BMI 29.64 kg/m²   OB Status Postmenopausal   Smoking Status Former   BSA 1.86 m²       Wt Readings from Last 1 Encounters:   04/17/25 77.1 kg (170 lb)       Physical Exam    General: Well appearing, no acute distress  Head: Normocephalic, atraumatic, neck supple without lymphadenopathy  Eyes:  non-injected  Ears: TM's nml  Nose: No nasal crease, nares patent, minimal discharge  Throat: no erythema, mild oral dryness  Heart: Regular rate and rhythm  Lungs: Clear to auscultation bilaterally, effort normal  Abdomen: Soft, non-tender, normal bowel sounds, no hsm, no bruising  Extremities: Unsteady gate, no edema  Skin: No rashes/lesions  Psych: normal mood and affect    LAB RESULTS:  CBC:  Recent Labs     09/26/24  1020 04/09/24  1250 09/12/23  1312   WBC 4.2* 7.0 5.8   HGB 15.0 15.3 13.5   HCT 44.1 46.0 42.3    308 320   MCV 94 98 93   EOSABS 0.12 0.08 0.06       CMP:  Recent Labs     02/04/25  1427 04/09/24  1250 04/03/23  1131    137 139   K 3.9 4.4 4.3    103 103   CO2 24 25 29   ANIONGAP 12 13 11   BUN 12 19 13   CREATININE 0.87 0.82 0.76   EGFR 73 79  --      Recent Labs     02/04/25  1427 04/09/24  1250 04/03/23  1131   ALBUMIN 4.5 4.3 4.6   ALKPHOS 55 46 43   ALT 7 6* 11   AST 14 13 13   BILITOT 0.6 0.7 0.8       ALLERGY:   Lab Results   Component Value Date    ICIGE 44.4 06/02/2023    WHITEASH 0.13 06/02/2023    SILVERBIRCH <0.10 06/02/2023    BOXELDER 0.11 06/02/2023    MOUNTJUNIPER <0.10 06/02/2023    COTTONWOOD <0.10 06/02/2023    ELM <0.10 06/02/2023    MULBERRY <0.10 06/02/2023    PECANHICKORY <0.10 06/02/2023    MAPLESYCAMOR <0.10 06/02/2023    OAK <0.10 06/02/2023    BERMUDAGR <0.10 06/02/2023    JOHNSONGR <0.10 06/02/2023    BLUEGRASS 0.12 06/02/2023    " TIMOTHYGRASS <0.10 06/02/2023     Lab Results   Component Value Date    LAMBQUART <0.10 06/02/2023    PIGWEED <0.10 06/02/2023    COMRAGWEED <0.10 06/02/2023    SHEEPSOR <0.10 06/02/2023    PLANTAIN <0.10 06/02/2023    CATEPI 0.59 (A) 06/02/2023    DOGEPI 0.11 06/02/2023    ALTERNA <0.10 06/02/2023    CLADHERB <0.10 06/02/2023    ICA04 <0.10 06/02/2023    DERMFAR 1.50 (A) 06/02/2023    DERMPTE 0.92 (A) 06/02/2023    COCKR <0.10 06/02/2023       Recent Labs     06/02/23  1228   ICIGE 44.4     Recent Labs     09/26/24  1020 04/09/24  1250 09/12/23  1312   EOSABS 0.12 0.08 0.06     Recent Labs     06/02/23  1228   TRYPTASE 2.4       IMMUNO:   Recent Labs     12/19/24  1443 06/17/24  1414 10/30/23  0922    870 501*   IGA 58* 72 51*   IGM 43 47 41     HEME/ENDO:  Recent Labs     04/10/25  1440 02/04/25  1427 09/26/24  1020 04/09/24  1250 01/09/24  1000 09/12/23  1312   TSH 53.66* 62.72* 0.09*   < >  --   --    HGBA1C  --  6.7* 6.5*  --  6.2*  --    FERRITIN  --   --   --   --   --  19   IRONSAT  --   --   --   --   --  9*    < > = values in this interval not displayed.         Assessment/Plan   Юлия is a 69 yo with Parkinson's and CVID.  She has hypothyroid.     -obtain labs and reassess infusion dose (Hizentra)  -we discussed her thyroid issue and she understands that getting the thyroid levels back in normal range will help with her fatigue and brain fog.  -She prefers Breo-she has used her 's medication and feel it worked better than advair/symbicort.    I will call results of labs  6 month follow up planned.  Follow up 4-5 months      Fanny Tian DO

## 2025-04-18 ENCOUNTER — TREATMENT (OUTPATIENT)
Dept: PHYSICAL THERAPY | Facility: HOSPITAL | Age: 70
End: 2025-04-18
Payer: MEDICARE

## 2025-04-18 DIAGNOSIS — G20.A2 PARKINSON'S DISEASE WITHOUT DYSKINESIA, WITH FLUCTUATING MANIFESTATIONS: ICD-10-CM

## 2025-04-18 DIAGNOSIS — R26.89 SHUFFLING GAIT: ICD-10-CM

## 2025-04-18 DIAGNOSIS — R29.898 BILATERAL LEG WEAKNESS: ICD-10-CM

## 2025-04-18 DIAGNOSIS — R26.89 IMPAIRMENT OF BALANCE: ICD-10-CM

## 2025-04-18 PROCEDURE — 97110 THERAPEUTIC EXERCISES: CPT | Mod: GP,CQ

## 2025-04-18 ASSESSMENT — PAIN SCALES - GENERAL: PAINLEVEL_OUTOF10: 0 - NO PAIN

## 2025-04-18 ASSESSMENT — PAIN - FUNCTIONAL ASSESSMENT: PAIN_FUNCTIONAL_ASSESSMENT: 0-10

## 2025-04-18 NOTE — PROGRESS NOTES
"Physical Therapy    Physical Therapy Treatment    Patient Name: Юлия Ku  MRN: [id number]  : 1955  Today's Date: 2025  Time Calculation  Start Time: 1045  Stop Time: 1125  Time Calculation (min): 40 min    PT Therapeutic Procedures Time Entry  Therapeutic Exercise Time Entry: 40          VISIT:# 7    Current Problem  Problem List Items Addressed This Visit           ICD-10-CM    Impairment of balance R26.89    Shuffling gait R26.89    Parkinson's disease without dyskinesia, with fluctuating manifestations G20.A2    Bilateral leg weakness R29.898        Subjective    Pt's  arrives with Pt using her FWW vs a transport chair on this date.  Pt reports he is walking with her more from the bathroom since last session      Pain  Pain Assessment: 0-10  0-10 (Numeric) Pain Score: 0 - No pain       Objective    95' x 2 FWW ambulation with CGA and vc for turns.              Precautions  Precautions  STEADI Fall Risk Score (The score of 4 or more indicates an increased risk of falling): unchanged  Precautions Comment: high fall risk      Treatments:     Date 4/4/25  2025  4/10/2025  4/15/25 2025    Visit # #3 #4 #5 #6 #7   REPS                 Nustep  10' @L1 10' @L2 L2 x10 min 10' L1-L2           // bars amb:  Fwd  Lat  Retro  Tandem  Lat over cones 1 UE WB  2 laps & march  2 laps 2UE WB  Talib UE  2 laps march  2 laps  1 laps    2 laps fwd/ side Talib UE WB  1 UE 2 laps  2 laps  2 laps    2 laps side/fwd            Step-taps        Foam stance    1 min x1    Gastroc stretch 30\"x3       HS stretch 30\"x2 ea                        Big Gait training FWW BIG  60ft x1, 130ft x1, 100ft FWW BIG   80' x , 60' x 1 80' x 2, 20' x 1   FWW CGA   95' x 2  20' x 1  FWW    Turns   6-8x  big steps 10x big steps 5x big steps   Sit-stand 5x Many times                Stairs up/down 4 steps 1x Talib HR reciprocally         Jain           DGI           5x Sit to Stand 18.14 seconds   19 sec 17.55 seconds    TUG        "              // bars: LE ex  HR  Marching  Hip abd  Hip ext  HS curls   20x    1x10 ea  1x10 ea  1x12 ea At sink  10 x 2   10 x 2   10 x 2   10 x 2  10 x 2       10 x 2   10 x 2   10 x 2   10 x 2  10 x 2                         Car transfer Standing step up into SUV CGA max VC's              Floor transfer x2 w/ mat assist                   HEP     Access Code: [email or web address]                             Assessment:    and I had a long discussion about having pt move more. He admits he gets frustrated with the freezing and he doesn't always have the patience.  We talked about utilizing care givers to give himself a break.  Pt reports pt's sister is doing her HEP with her.  We also discussed with pt the importance of her doing more at home vs sitting all day as that is not helpful.  Distance improved today       Plan: Cont to improve gait.   OP PT Plan  Treatment/Interventions: Education/ Instruction, Manual therapy, Neuromuscular re-education, Therapeutic activities, Therapeutic exercises, Gait training  PT Plan: Skilled PT  PT Frequency: 2 times per week  Duration: 8 weeks  Certification Period Start Date: 03/28/25  Certification Period End Date: 05/23/25  Number of Treatments Authorized: medical necessity  Rehab Potential: Good  Plan of Care Agreement: Patient    Goals:  Active       Parkinson's / Long COVID       1) Independent standing home exercise program with 90% teach back capability by the patient to show progression (Progressing)       Start:  03/28/25    Expected End:  04/25/25            2) Improve static balance Jain score >6 points to reduce her fall risk with standing ADL's       Start:  03/28/25    Expected End:  05/23/25            3) Improve MMT of Talib LE's to >/=4+/5 to improve joint stability with sit-stand transfers w/o UE WB or external assist       Start:  03/28/25    Expected End:  05/23/25            4) Functional Outcome ABC score improves to >/=40% confidence to reduce her FOF       Start:   03/28/25    Expected End:  05/23/25            5) Pt to amb >150ft w/least restrictive device Mod Indep-Sup with >50% decreased freezing occurrences       Start:  03/28/25    Expected End:  05/23/25

## 2025-04-19 LAB
IGA SERPL-MCNC: NORMAL MG/DL
IGG SERPL-MCNC: NORMAL MG/DL
IGM SERPL-MCNC: NORMAL MG/DL
VIT B12 SERPL-MCNC: 1498 PG/ML (ref 200–1100)

## 2025-04-21 ENCOUNTER — PATIENT MESSAGE (OUTPATIENT)
Dept: INTEGRATIVE MEDICINE | Facility: CLINIC | Age: 70
End: 2025-04-21

## 2025-04-21 ENCOUNTER — APPOINTMENT (OUTPATIENT)
Dept: INTEGRATIVE MEDICINE | Facility: CLINIC | Age: 70
End: 2025-04-21
Payer: MEDICARE

## 2025-04-21 DIAGNOSIS — G20.C PARKINSONISM, UNSPECIFIED PARKINSONISM TYPE (MULTI): ICD-10-CM

## 2025-04-21 DIAGNOSIS — E89.0 POSTABLATIVE HYPOTHYROIDISM: Primary | Chronic | ICD-10-CM

## 2025-04-21 DIAGNOSIS — G20.A2 PARKINSON'S DISEASE WITHOUT DYSKINESIA, WITH FLUCTUATING MANIFESTATIONS: ICD-10-CM

## 2025-04-21 LAB
IGA SERPL-MCNC: 63 MG/DL (ref 70–320)
IGG SERPL-MCNC: 700 MG/DL (ref 600–1540)
IGM SERPL-MCNC: 43 MG/DL (ref 50–300)
VIT B12 SERPL-MCNC: 1498 PG/ML (ref 200–1100)

## 2025-04-21 PROCEDURE — 99214 OFFICE O/P EST MOD 30 MIN: CPT | Performed by: INTERNAL MEDICINE

## 2025-04-21 PROCEDURE — 4010F ACE/ARB THERAPY RXD/TAKEN: CPT | Performed by: INTERNAL MEDICINE

## 2025-04-21 PROCEDURE — G2211 COMPLEX E/M VISIT ADD ON: HCPCS | Performed by: INTERNAL MEDICINE

## 2025-04-21 RX ORDER — CARBIDOPA AND LEVODOPA 25; 100 MG/1; MG/1
TABLET ORAL
Qty: 360 TABLET | Refills: 3 | Status: SHIPPED | OUTPATIENT
Start: 2025-04-21

## 2025-04-21 ASSESSMENT — ENCOUNTER SYMPTOMS
ACTIVITY CHANGE: 1
UNEXPECTED WEIGHT CHANGE: 1
FATIGUE: 1
ABDOMINAL DISTENTION: 0
CONSTIPATION: 1
ABDOMINAL PAIN: 0
NECK STIFFNESS: 0
DIZZINESS: 1
NAUSEA: 0
DIARRHEA: 0
SLEEP DISTURBANCE: 1
DECREASED CONCENTRATION: 1
NECK PAIN: 0
CONFUSION: 1
NERVOUS/ANXIOUS: 1

## 2025-04-21 NOTE — PROGRESS NOTES
Integrative Medicine Follow-up Visit :     Subjective   Patient ID: Юлия Ku is a 70 y.o. female who presents for Hypothyroidism and Fatigue       HPI  She feels more tire than when we met and her tirosint has remained at 50 mcg by mouth daily. Taking it every morning on an empty stomach. Very tired after taking the pill in am. Want to know if ok to take at night.   They report that she feels much more tired since starting the synthroid.    Getting PT to help with her movement.  does not notice a big change in her ability to walk. They will finish this the first week of may.   Wants to know if she needs to keep taking the nasal b12 prescribed by some other doctor.   Lab Results   Component Value Date    VITD25 76 09/26/2024           Review of Systems   Constitutional:  Positive for activity change, fatigue and unexpected weight change.   Gastrointestinal:  Positive for constipation. Negative for abdominal distention, abdominal pain, diarrhea and nausea.   Musculoskeletal:  Positive for gait problem. Negative for neck pain and neck stiffness.   Skin:  Negative for rash.   Neurological:  Positive for dizziness.   Psychiatric/Behavioral:  Positive for confusion, decreased concentration and sleep disturbance. Negative for suicidal ideas. The patient is nervous/anxious.                   Objective   There were no vitals taken for this visit.    Physical Exam  Constitutional:       Comments: overweight   HENT:      Head: Normocephalic and atraumatic.      Mouth/Throat:      Mouth: Mucous membranes are moist.   Eyes:      Conjunctiva/sclera: Conjunctivae normal.      Pupils: Pupils are equal, round, and reactive to light.   Cardiovascular:      Rate and Rhythm: Normal rate.   Pulmonary:      Effort: Pulmonary effort is normal. No respiratory distress.   Abdominal:      General: There is no distension.      Palpations: Abdomen is soft.      Tenderness: There is no abdominal tenderness.   Musculoskeletal:          General: No swelling or deformity.      Cervical back: Normal range of motion.   Skin:     General: Skin is dry.      Findings: No rash.   Neurological:      Mental Status: She is alert and oriented to person, place, and time.      Motor: Weakness present.      Gait: Gait abnormal.      Comments: Needs assistance to get out of the wheel chair onto the table and has extreme trouble with balance.    Psychiatric:         Mood and Affect: Mood normal.         Thought Content: Thought content normal.      Comments: Normal affect                      Assessment/Plan     Problem List Items Addressed This Visit           ICD-10-CM    Postablative hypothyroidism - Primary (Chronic) E89.0    Relevant Orders    Thyroxine, Free    Thyroid Stimulating Hormone     Still really tired. Patient reports taking the pill makes her more tired. Want to see if we can get her tsh to be in euthyroid range before we make any changes to time of day for taking the pill.   Recheck on new dose of thyroid medication in 1 month. See me a few days after labs.     Recommend Follow up in : 1 month.     Mi Fox MD PhD    Time Spent  Prep time on day of patient encounter: 5 minutes  Time spent directly with patient, family or caregiver: 20 minutes  Additional Time Spent on Patient Care Activities: 0 minutes  Documentation Time: 5 minutes  Other Time Spent: 0 minutes  Total: 30 minutes

## 2025-04-21 NOTE — PATIENT INSTRUCTIONS
Since you have levothyroxine 88 mcg at home, please increase the dose to 88 mcg every morning on an empty stomach with no eating or drinking for 30 minutes.   See if you can be seen by endocrinology sooner.   Recommend rechecking b12 level in a year but keep taking some sublingual vitamin b12 500-1000 mcg daily.   Suggest taking vitamin d 2000 ,international units by mouth daily.   Recheck the thyroid labs on may 15 or May 16.   Mi Fox MD PhD

## 2025-04-22 ENCOUNTER — TREATMENT (OUTPATIENT)
Dept: PHYSICAL THERAPY | Facility: HOSPITAL | Age: 70
End: 2025-04-22
Payer: MEDICARE

## 2025-04-22 ENCOUNTER — PATIENT MESSAGE (OUTPATIENT)
Dept: PRIMARY CARE | Facility: CLINIC | Age: 70
End: 2025-04-22

## 2025-04-22 DIAGNOSIS — R26.89 IMPAIRMENT OF BALANCE: ICD-10-CM

## 2025-04-22 DIAGNOSIS — R42 VERTIGO: Primary | ICD-10-CM

## 2025-04-22 DIAGNOSIS — G20.A2 PARKINSON'S DISEASE WITHOUT DYSKINESIA, WITH FLUCTUATING MANIFESTATIONS: ICD-10-CM

## 2025-04-22 DIAGNOSIS — G20.A2 PARKINSON'S DISEASE WITHOUT DYSKINESIA, WITH FLUCTUATING MANIFESTATIONS: Primary | ICD-10-CM

## 2025-04-22 DIAGNOSIS — R29.898 BILATERAL LEG WEAKNESS: ICD-10-CM

## 2025-04-22 DIAGNOSIS — R26.89 SHUFFLING GAIT: ICD-10-CM

## 2025-04-22 PROCEDURE — 97110 THERAPEUTIC EXERCISES: CPT | Mod: GP,CQ

## 2025-04-22 PROCEDURE — 97116 GAIT TRAINING THERAPY: CPT | Mod: GP,CQ

## 2025-04-22 ASSESSMENT — PAIN - FUNCTIONAL ASSESSMENT: PAIN_FUNCTIONAL_ASSESSMENT: 0-10

## 2025-04-22 ASSESSMENT — PAIN SCALES - GENERAL: PAINLEVEL_OUTOF10: 0 - NO PAIN

## 2025-04-22 NOTE — PROGRESS NOTES
"Physical Therapy    Physical Therapy Treatment    Patient Name: Юлия Ku  MRN: [id number]  : 1955  Today's Date: 2025  Time Calculation  Start Time: 1300  Stop Time: 1345  Time Calculation (min): 45 min    PT Therapeutic Procedures Time Entry  Therapeutic Exercise Time Entry: 30  Gait Training Time Entry: 15          VISIT:# 8    Current Problem  Problem List Items Addressed This Visit           ICD-10-CM    Impairment of balance R26.89    Shuffling gait R26.89    Parkinson's disease without dyskinesia, with fluctuating manifestations G20.A2    Bilateral leg weakness R29.898        Subjective     reports pt needs help working her hand and arm.  Let  know we are unable to do that but we will put in a request in for OT (done with Dr. Aguilar)     Pain  Pain Assessment: 0-10  0-10 (Numeric) Pain Score: 0 - No pain       Objective    Big walking with FWW light CGA/SBA included up/down ramp 205.4 ft    165 ft   Car transfer with steps into the truck SBA/CGA                    Precautions  Precautions  STEADI Fall Risk Score (The score of 4 or more indicates an increased risk of falling): unchanged  Precautions Comment: high fall risk      Treatments:     Date 4/4/25  2025  4/10/2025  4/15/25 2025  2025    Visit # #3 #4 #5 #6 #7 #8   REPS                   Nustep  10' @L1 10' @L2 L2 x10 min 10' L1-L2 10' @L3-2            // bars amb:  Fwd  Lat  Retro  Tandem  Lat over cones 1 UE WB  2 laps & march  2 laps 2UE WB  Talib UE  2 laps march  2 laps  1 laps    2 laps fwd/ side Talib UE WB  1 UE 2 laps  2 laps  2 laps    2 laps side/fwd  Talib UE  2 laps  2 laps  2 laps            Step-taps      4\" 3 x 10   bilUE  Single UE  No UE (cga)   Foam stance    1 min x1     Gastroc stretch 30\"x3        HS stretch 30\"x2 ea                           Big Gait training FWW BIG  60ft x1, 130ft x1, 100ft FWW BIG   80' x , 60' x 1 80' x 2, 20' x 1   FWW CGA   95' x 2  20' x 1  FWW  205.4'  165'  " FWW  CGA/SBA     Turns   6-8x  big steps 10x big steps 5x big steps    Sit-stand 5x Many times                  Stairs up/down 4 steps 1x Talib HR reciprocally          Jain            DGI            5x Sit to Stand 18.14 seconds   19 sec 17.55 seconds     TUG                       // bars: LE ex  HR  Marching  Hip abd  Hip ext  HS curls   20x    1x10 ea  1x10 ea  1x12 ea At sink  10 x 2   10 x 2   10 x 2   10 x 2  10 x 2       10 x 2   10 x 2   10 x 2   10 x 2  10 x 2                            Car transfer Standing step up into SUV CGA max VC's       Standing step up into SUV CGA/SBA        Floor transfer x2 w/ mat assist                     HEP     Access Code: [email or web address]                              Assessment:   POC was discussed with PT Judy.  Pt improved her distance with gait today including walking up/down a ramp with bigger steps when cued.  Turns were quick and she needed frequent cues to remain safe.         Plan: Cont to progress functional mobility.    OP PT Plan  Treatment/Interventions: Education/ Instruction, Manual therapy, Neuromuscular re-education, Therapeutic activities, Therapeutic exercises, Gait training  PT Plan: Skilled PT  PT Frequency: 2 times per week  Duration: 8 weeks  Certification Period Start Date: 03/28/25  Certification Period End Date: 05/23/25  Number of Treatments Authorized: medical necessity  Rehab Potential: Good  Plan of Care Agreement: Patient    Goals:  Active       Parkinson's / Long COVID       1) Independent standing home exercise program with 90% teach back capability by the patient to show progression (Progressing)       Start:  03/28/25    Expected End:  04/25/25            2) Improve static balance Jain score >6 points to reduce her fall risk with standing ADL's       Start:  03/28/25    Expected End:  05/23/25            3) Improve MMT of Talib LE's to >/=4+/5 to improve joint stability with sit-stand transfers w/o UE WB or external assist       Start:  03/28/25     Expected End:  05/23/25            4) Functional Outcome ABC score improves to >/=40% confidence to reduce her FOF       Start:  03/28/25    Expected End:  05/23/25            5) Pt to amb >150ft w/least restrictive device Mod Indep-Sup with >50% decreased freezing occurrences       Start:  03/28/25    Expected End:  05/23/25

## 2025-04-25 ENCOUNTER — TREATMENT (OUTPATIENT)
Dept: PHYSICAL THERAPY | Facility: HOSPITAL | Age: 70
End: 2025-04-25
Payer: MEDICARE

## 2025-04-25 DIAGNOSIS — R26.89 SHUFFLING GAIT: ICD-10-CM

## 2025-04-25 DIAGNOSIS — R29.898 BILATERAL LEG WEAKNESS: ICD-10-CM

## 2025-04-25 DIAGNOSIS — E89.0 POSTABLATIVE HYPOTHYROIDISM: Chronic | ICD-10-CM

## 2025-04-25 DIAGNOSIS — G20.A2 PARKINSON'S DISEASE WITHOUT DYSKINESIA, WITH FLUCTUATING MANIFESTATIONS: ICD-10-CM

## 2025-04-25 DIAGNOSIS — R26.89 IMPAIRMENT OF BALANCE: ICD-10-CM

## 2025-04-25 PROCEDURE — 97112 NEUROMUSCULAR REEDUCATION: CPT | Mod: GP | Performed by: PHYSICAL THERAPIST

## 2025-04-25 PROCEDURE — 97110 THERAPEUTIC EXERCISES: CPT | Mod: GP | Performed by: PHYSICAL THERAPIST

## 2025-04-25 ASSESSMENT — BALANCE ASSESSMENTS
STANDING TO SITTING: ABLE TO STAND WITHOUT USING HANDS AND STABILIZE INDEPENDENTLY
SITTING WITH BACK UNSUPPORTED BUT FEET SUPPORTED ON FLOOR OR ON A STOOL: ABLE TO SIT SAFELY AND SECURELY FOR 2 MINUTES
STANDING ON ONE LEG: TRIES TO LIFT LEG UNABLE TO HOLD 3 SECONDS BUT REMAINS STANDING INDEPENDENTLY
STANDING UNSUPPORTED ONE FOOT IN FRONT: ABLE TO TAKE SMALL STEP INDEPENDENTLY AND HOLD 30 SECONDS
STANDING UNSUPPORTED WITH FEET TOGETHER: NEEDS HELP TO ATTAIN POSITION BUT ABLE TO STAND 15 SECONDS FEET TOGETHER
STANDING UNSUPPORTED: ABLE TO STAND SAFELY FOR 2 MINUTES
REACHING FORWARD WITH OUTSTRETCHED ARM WHILE STANDING: CAN REACH FORWARD CONFIDENTLY 25 CM (10 INCHES)
PLACE ALTERNATE FOOT ON STEP OR STOOL WHILE STANDING UNSUPPORTED: ABLE TO COMPLETE 4 STEPS WITHOUT AID WITH SUPERVISION
STANDING TO SITTING: CONTROLS DESCENT BY USING HANDS
LONG VERSION TOTAL SCORE (MAX 56): 40
PLACE ALTERNATE FOOT ON STEP OR STOOL WHILE STANDING UNSUPPORTED: LOOKS BEHIND FROM BOTH SIDES AND WEIGHT SHIFTS WELL
STANDING UNSUPPORTED WITH EYES CLOSED: ABLE TO STAND 10 SECONDS SAFELY
TRANSFERS: ABLE TO TRANSFER WITH VERBAL CUEING AND/OR SUPERVISION
PICK UP OBJECT FROM THE FLOOR FROM A STANDING POSITION: ABLE TO PICK UP SLIPPER SAFELY AND EASILY
TURN 360 DEGREES: NEEDS CLOSE SUPERVISION OR VERBAL CUEING

## 2025-04-25 ASSESSMENT — PAIN SCALES - GENERAL: PAINLEVEL_OUTOF10: 0 - NO PAIN

## 2025-04-25 ASSESSMENT — PAIN - FUNCTIONAL ASSESSMENT: PAIN_FUNCTIONAL_ASSESSMENT: 0-10

## 2025-04-25 NOTE — PROGRESS NOTES
Physical Therapy    Physical Therapy Reassessment    Patient Name: Юлия Ku  MRN: [id number]  : 1955  Today's Date: 2025  Time Calculation  Start Time: 1300  Stop Time: 1350  Time Calculation (min): 50 min    PT Therapeutic Procedures Time Entry  Neuromuscular Re-Education Time Entry: 35  Therapeutic Exercise Time Entry: 15        VISIT:# 9    Current Problem  Problem List Items Addressed This Visit           ICD-10-CM    Impairment of balance R26.89    Shuffling gait R26.89    Parkinson's disease without dyskinesia, with fluctuating manifestations G20.A2    Bilateral leg weakness R29.898      Subjective    Pt denies any falls since last visit and no close calls/sig LOB reported either. She c/o increased fatigue and feeling very out of it today. Pt's  thinks it's due to her sleeping poorly last night. No WC used to come into the hospital today and pt walked w/her FWW.     Pain  Pain Assessment: 0-10  0-10 (Numeric) Pain Score: 0 - No pain     Objective   LE Strength:   Measured with seated MMT'ing, grossly WNL's with exception of those listed below    Right Left   Hip:       Flexion 4,4+ 4,4+   Abduction 4 4   Adduction 4+,5 4+,5           Knee:       Extension 4+ 4+   Flexion 4+ 4+           Ankle:       DF 4+ 4+   PF  5  5     Other Measures  5x Sit to Stand: 16.22 seconds  Activities - Specific Balance Confidence Scale: 18.13     Jain Balance Scale  1. Sitting to Standing: Able to stand without using hands and stabilize independently  2. Standing Unsupported: Able to stand safely for 2 minutes  3. Sitting with Back Unsupported but Feet Supported on Floor or on a Stool: Able to sit safely and securely for 2 minutes  4. Standing to Sitting: Controls descent by using hands  5.  Transfers: Able to transfer with verbal cueing and/or supervision  6. Standing Unsupported with Eyes Closed: Able to stand 10 seconds safely  7. Standing Unsupported with Feet Together: Needs help to attain position  "but able to stand 15 seconds feet together  8. Reach Forward with Outstretched Arm While Standing: Can reach forward confidently 25 cm (10 inches)  9.  Object from Floor from a Standing Position: Able to  slipper safely and easily  10. Turning to Look Behind Over Left and Right Shoulders While Standing: Looks behind from both sides and weight shifts well  11. Turn 360 Degrees: Needs close supervision or verbal cueing  12. Place Alternate Foot on Step or Stool While Standing Unsupported: Able to complete 4 steps without aid with supervision  13. Standing Unsupported One Foot in Front: Able to take small step independently and hold 30 seconds  14. Standing on One Leg: Tries to lift leg unable to hold 3 seconds but remains standing independently  Jain Balance Score: 40     Precautions  Precautions  STEADI Fall Risk Score (The score of 4 or more indicates an increased risk of falling): unchanged  Precautions Comment: high fall risk    Treatments:  Date 4/15/25 4/18/2025  4/22/2025  4/25/25   Visit # #6 #7 #8 #9   REPS               Nustep L2 x10 min 10' L1-L2 10' @L3-2 L3 x8 min          // bars amb:  Fwd  Lat  Retro  Tandem  Lat over cones Talib UE WB  1 UE 2 laps  2 laps  2 laps    2 laps side/fwd  Talib UE  2 laps  2 laps  2 laps           Step-taps   4\" 3 x 10   bilUE  Single UE  No UE (cga)    Foam stance 1 min x1      Gastroc stretch       HS stretch                      Big Gait training  95' x 2  20' x 1  FWW  205.4'  165'  FWW  CGA/SBA   X w/FWW   Turns 10x big steps 5x big steps     Sit-stand    5x2           Stairs    Up/down 4 steps CGA and max VC's   Jain    40/56   DGI       5x Sit to Stand 17.55 seconds   16.22 seconds   TUG               // bars: LE ex  HR  Marching  Hip abd  Hip ext  HS curls    10 x 2   10 x 2   10 x 2   10 x 2  10 x 2                     Car transfer   Standing step up into SUV CGA/SBA                    HEP     Reviewed w/ and pt     Assessment:   Pt's static Jain " "balance score increased since IE and 5x sit-stand timed score improved as well. Pt continues to require max VC's for proper sit-stand technique every time and has the tendency to do better with less cueing when not being tested/timed. Sig freezing episodes persist with ambulation and turning and are not consistent with only doorways/julee transitions/turns. Max VC's required to \"step over rocks\" with amb using big steps and to break freezing episodes. Up/down 4 steps reciprocally CGA w/Talib HR with increased ease, but difficulty turning around at the top to descend. Educated pt and  that she should perform her standing HEP at least 1x/day to increase her time spent in functional standing and increase LE WB, both were agreeable.       Plan:    Continue PT 2x/wk for increased static & dynamic stability and gait independence    Goals:  Active       Parkinson's / Long COVID       1) Independent standing home exercise program with 90% teach back capability by the patient to show progression (Progressing)       Start:  03/28/25    Expected End:  04/25/25            2) Improve static balance Jain score >6 points to reduce her fall risk with standing ADL's (Progressing)       Start:  03/28/25    Expected End:  05/23/25            3) Improve MMT of Talib LE's to >/=4+/5 to improve joint stability with sit-stand transfers w/o UE WB or external assist (Progressing)       Start:  03/28/25    Expected End:  05/23/25            4) Functional Outcome ABC score improves to >/=40% confidence to reduce her FOF (Progressing)       Start:  03/28/25    Expected End:  05/23/25            5) Pt to amb >150ft w/least restrictive device Mod Indep-Sup with >50% decreased freezing occurrences (Progressing)       Start:  03/28/25    Expected End:  05/23/25               "

## 2025-04-29 ENCOUNTER — TREATMENT (OUTPATIENT)
Dept: PHYSICAL THERAPY | Facility: HOSPITAL | Age: 70
End: 2025-04-29
Payer: MEDICARE

## 2025-04-29 DIAGNOSIS — R26.89 SHUFFLING GAIT: ICD-10-CM

## 2025-04-29 DIAGNOSIS — R29.898 BILATERAL LEG WEAKNESS: ICD-10-CM

## 2025-04-29 DIAGNOSIS — R26.89 IMPAIRMENT OF BALANCE: ICD-10-CM

## 2025-04-29 DIAGNOSIS — G20.A2 PARKINSON'S DISEASE WITHOUT DYSKINESIA, WITH FLUCTUATING MANIFESTATIONS: ICD-10-CM

## 2025-04-29 PROCEDURE — 97110 THERAPEUTIC EXERCISES: CPT | Mod: GP,CQ

## 2025-04-29 PROCEDURE — 97116 GAIT TRAINING THERAPY: CPT | Mod: GP,CQ

## 2025-04-29 ASSESSMENT — PAIN - FUNCTIONAL ASSESSMENT: PAIN_FUNCTIONAL_ASSESSMENT: 0-10

## 2025-04-29 ASSESSMENT — PAIN SCALES - GENERAL: PAINLEVEL_OUTOF10: 0 - NO PAIN

## 2025-04-29 NOTE — PROGRESS NOTES
"Physical Therapy    Physical Therapy Treatment    Patient Name: Юлия Ku  MRN: [id number]  : 1955  Today's Date: 2025  Time Calculation  Start Time: 014  Stop Time: 0230  Time Calculation (min): 45 min    PT Therapeutic Procedures Time Entry  Therapeutic Exercise Time Entry: 25  Gait Training Time Entry: 20          VISIT:# 10    Current Problem  Problem List Items Addressed This Visit           ICD-10-CM    Impairment of balance R26.89    Shuffling gait R26.89    Parkinson's disease without dyskinesia, with fluctuating manifestations G20.A2    Bilateral leg weakness R29.898        Subjective    Per pt's  she is walking better but her turns have not improved.  Pt had an IGG infusion on .  Per pt she isn't doing it again because she doesn't think they work and she has fog.  Per pt's  she is wiped out for 2 days.       Pain  Pain Assessment: 0-10  0-10 (Numeric) Pain Score: 0 - No pain       Objective    Ambulation with FWW sba/cga with 75% of the time freezing with turns.  Pt ambulated with Min to mod cues to increase her step length              Precautions  Precautions  STEADI Fall Risk Score (The score of 4 or more indicates an increased risk of falling): unchanged  Precautions Comment: high fall risk      Treatments:     Date 4/15/25 2025  2025  4/25/25 2025    Visit # #6 #7 #8 #9 #10   REPS                 Nustep L2 x10 min 10' L1-L2 10' @L3-2 L3 x8 min 10' @L3           // bars amb:  Fwd  Lat  Retro  Tandem  Lat over cones Talib UE WB  1 UE 2 laps  2 laps  2 laps    2 laps side/fwd  Talib UE  2 laps  2 laps  2 laps  Talib UE  2 laps  2 laps  2 laps    next           Step-taps   4\" 3 x 10   bilUE  Single UE  No UE (cga)     Foam stance 1 min x1       Gastroc stretch        HS stretch                         Big Gait training  95' x 2  20' x 1  FWW  205.4'  165'  FWW  CGA/SBA   X w/' x 2   165' x 2   Fww sba/cga   Turns 10x big steps 5x big steps    "   Sit-stand    5x2             Stairs    Up/down 4 steps CGA and max VC's    Jain    40/56    DGI        5x Sit to Stand 17.55 seconds   16.22 seconds    TUG                 // bars: LE ex  HR  Marching  Hip abd  Hip ext  HS curls    10 x 2   10 x 2   10 x 2   10 x 2  10 x 2                        Car transfer   Standing step up into SUV CGA/SBA           Turns  Max cues 75% of the time            HEP     Reviewed w/ and pt                          Assessment:   Pt tolerated treatment without complaint of increase in symptoms.  Pt required max cues with turns.  Pt was able to complete 10' on the nustep without asking to stop or decrease the resistance level.        Plan: Cont to progress functional mobility   OP PT Plan  Treatment/Interventions: Education/ Instruction, Manual therapy, Neuromuscular re-education, Therapeutic activities, Therapeutic exercises, Gait training  PT Plan: Skilled PT  PT Frequency: 2 times per week  Duration: 8 weeks  Certification Period Start Date: 03/28/25  Certification Period End Date: 05/23/25  Number of Treatments Authorized: medical necessity  Rehab Potential: Good  Plan of Care Agreement: Patient    Goals:  Active       Parkinson's / Long COVID       1) Independent standing home exercise program with 90% teach back capability by the patient to show progression (Progressing)       Start:  03/28/25    Expected End:  04/25/25            2) Improve static balance Jain score >6 points to reduce her fall risk with standing ADL's (Progressing)       Start:  03/28/25    Expected End:  05/23/25            3) Improve MMT of Talib LE's to >/=4+/5 to improve joint stability with sit-stand transfers w/o UE WB or external assist (Progressing)       Start:  03/28/25    Expected End:  05/23/25            4) Functional Outcome ABC score improves to >/=40% confidence to reduce her FOF (Progressing)       Start:  03/28/25    Expected End:  05/23/25            5) Pt to amb >150ft w/least  restrictive device Mod Indep-Sup with >50% decreased freezing occurrences (Progressing)       Start:  03/28/25    Expected End:  05/23/25

## 2025-05-02 ENCOUNTER — TREATMENT (OUTPATIENT)
Dept: PHYSICAL THERAPY | Facility: HOSPITAL | Age: 70
End: 2025-05-02
Payer: MEDICARE

## 2025-05-02 DIAGNOSIS — R29.898 BILATERAL LEG WEAKNESS: ICD-10-CM

## 2025-05-02 DIAGNOSIS — R26.89 SHUFFLING GAIT: ICD-10-CM

## 2025-05-02 DIAGNOSIS — R26.89 IMPAIRMENT OF BALANCE: ICD-10-CM

## 2025-05-02 DIAGNOSIS — G20.A2 PARKINSON'S DISEASE WITHOUT DYSKINESIA, WITH FLUCTUATING MANIFESTATIONS: ICD-10-CM

## 2025-05-02 PROCEDURE — 97110 THERAPEUTIC EXERCISES: CPT | Mod: GP,CQ

## 2025-05-02 PROCEDURE — 97116 GAIT TRAINING THERAPY: CPT | Mod: GP,CQ

## 2025-05-02 ASSESSMENT — PAIN SCALES - GENERAL: PAINLEVEL_OUTOF10: 0 - NO PAIN

## 2025-05-02 ASSESSMENT — PAIN - FUNCTIONAL ASSESSMENT: PAIN_FUNCTIONAL_ASSESSMENT: 0-10

## 2025-05-05 ENCOUNTER — EVALUATION (OUTPATIENT)
Dept: OCCUPATIONAL THERAPY | Facility: HOSPITAL | Age: 70
End: 2025-05-05
Payer: MEDICARE

## 2025-05-05 DIAGNOSIS — R53.1 WEAKNESS: ICD-10-CM

## 2025-05-05 DIAGNOSIS — R27.8 ABNORMAL COORDINATION: Primary | ICD-10-CM

## 2025-05-05 DIAGNOSIS — G20.A2 PARKINSON'S DISEASE WITHOUT DYSKINESIA, WITH FLUCTUATING MANIFESTATIONS: ICD-10-CM

## 2025-05-05 PROCEDURE — 97165 OT EVAL LOW COMPLEX 30 MIN: CPT | Mod: GO | Performed by: OCCUPATIONAL THERAPIST

## 2025-05-05 PROCEDURE — 97530 THERAPEUTIC ACTIVITIES: CPT | Mod: GO | Performed by: OCCUPATIONAL THERAPIST

## 2025-05-05 ASSESSMENT — PATIENT HEALTH QUESTIONNAIRE - PHQ9
SUM OF ALL RESPONSES TO PHQ9 QUESTIONS 1 AND 2: 3
4. FEELING TIRED OR HAVING LITTLE ENERGY: MORE THAN HALF THE DAYS
2. FEELING DOWN, DEPRESSED OR HOPELESS: NOT AT ALL
6. FEELING BAD ABOUT YOURSELF - OR THAT YOU ARE A FAILURE OR HAVE LET YOURSELF OR YOUR FAMILY DOWN: NOT AT ALL
5. POOR APPETITE OR OVEREATING: NOT AT ALL
3. TROUBLE FALLING OR STAYING ASLEEP OR SLEEPING TOO MUCH: NOT AT ALL
SUM OF ALL RESPONSES TO PHQ QUESTIONS 1-9: 11
9. THOUGHTS THAT YOU WOULD BE BETTER OFF DEAD, OR OF HURTING YOURSELF: NOT AT ALL
7. TROUBLE CONCENTRATING ON THINGS, SUCH AS READING THE NEWSPAPER OR WATCHING TELEVISION: NEARLY EVERY DAY
8. MOVING OR SPEAKING SO SLOWLY THAT OTHER PEOPLE COULD HAVE NOTICED. OR THE OPPOSITE, BEING SO FIGETY OR RESTLESS THAT YOU HAVE BEEN MOVING AROUND A LOT MORE THAN USUAL: NEARLY EVERY DAY
1. LITTLE INTEREST OR PLEASURE IN DOING THINGS: NEARLY EVERY DAY

## 2025-05-05 ASSESSMENT — ENCOUNTER SYMPTOMS
DEPRESSION: 0
LOSS OF SENSATION IN FEET: 0
OCCASIONAL FEELINGS OF UNSTEADINESS: 1

## 2025-05-05 ASSESSMENT — PAIN SCALES - GENERAL: PAINLEVEL_OUTOF10: 0 - NO PAIN

## 2025-05-05 ASSESSMENT — PAIN - FUNCTIONAL ASSESSMENT: PAIN_FUNCTIONAL_ASSESSMENT: 0-10

## 2025-05-05 NOTE — PATIENT COMMUNICATION
Please let her know that I reviewed CPAP report on 3/30 and her CPAP is working well to treat her sleep apnea. No pressure adjustments required.

## 2025-05-05 NOTE — PROGRESS NOTES
Occupational Therapy    Evaluation/Treatment    Patient Name: Юлия Ku  MRN: [id number]  : 1955  Today's Date: 2025     Time Calculation  Start Time: 1435  Stop Time: 1522  Time Calculation (min): 47 min  Visit Number: 1  Therapeutic Procedure Codes:  OT Evaluation Time Entry  OT Evaluation (Low) Time Entry: 30   OT Therapeutic Procedures Time Entry  Therapeutic Activity Time Entry: 17       Subjective   Current Problem:  1. Abnormal coordination        2. Parkinson's disease without dyskinesia, with fluctuating manifestations  Referral to Occupational Therapy    Follow Up In Occupational Therapy      3. Weakness          General:   OT Received On: 25  General  Reason for Referral: weakness  Referred By: Dr. Aguilar  Pt reporting she had Covid in , developed long haulers in . Spouse reporting symptoms have progressed over the years. Pt saw Tammie Guardado OT, discharged on 25. Pt reporting she has difficulty with hand writing and overall UE strength. Spouse reporting UE vibrating during tasks. Pt stating difficulty with short term memory. Pt would like to work on UE strengthening and hand writing tasks.     Hand Dominance: R handed    Precautions:  STEADI Fall Risk Score (The score of 4 or more indicates an increased risk of falling): 9   I have reviewed patients medical history form.     Pain:  Pain Assessment  Pain Assessment: 0-10  0-10 (Numeric) Pain Score: 0 - No pain    Objective      Home Living:    Lives with spouse.   Prior Function:    IND   x40 years  ADL:   Pt able to complete ADL tasks seated with SUP. Spouse is always holding onto pt during standing tasks.   Activity Tolerance:   Poor   Pt reporting 9/10 on fatigue scale.   Fatigue noted during ADL tasks. Increased difficulty with increased duration.   Vision:  Intact   Sensation:   Intact  Strength:   BUE grossly 4/5  Coordination:    Grossly WFL.   Rapid alternating movements intact  Outcome Measures:    Quickdash Scores: 81.82%    Therapy/Activity:    5/5/2025   Exercises: Reps:                   Activities:    Pink t-putty 1x5 composite flexion, isolated pinch, digit abduction   In hand manipulation Palm to finger, finger to palm translation 1x10 B hands. Pt instructed to stack pennies. Increased difficulty.    Theraband                HEP provided on HEP provided to pt including pink t-putty. Pt instructed to complete 1x a day, 5-10 reps within pain free range. Handouts provided to pt.    Modalities:                    Manual:                    Functional review:     Completed on: 5/5/2025 OT evaluation   Measurements:  :  Average taken from best 3 measurements.   Trials Average   RUE 35, 43, 32 36   LUE 36, 33, 23 31     Lateral Pinch:   Trials Average   RUE 12, 12, 12 12   LUE 11, 12, 12 12     Three Point Pinch:   Trials Average   RUE 11, 9, 9  10   LUE 8, 9, 8 8     Nine Hole Peg Test:  RUE 31 seconds   LUE 27 seconds     OP EDUCATION:  Education  Individual(s) Educated: Patient  Education Provided: Diagnosis & Precautions, Symptom management, Fall precautons, POC discussed and agreed upon, Risk and benefits of OT discussed with patient or other  Home Program: [facility], Handout issued  Risk and Benefits Discussed with Patient/Caregiver/Other: yes  Patient/Caregiver Demonstrated Understanding: yes  Plan of Care Discussed and Agreed Upon: yes  Patient Response to Education: Patient/Caregiver Verbalized Understanding of Information    Assessment:   Pt is a 69 y/o F who presents to this facility with performance deficits in strength, FMC, and activity tolerance limiting ability to complete ADL and IADL tasks. Pt  provided with HEP including t-putty and in hand manipulation. Handouts provided to pt. Pt demonstrated understanding. Pt would continue to benefit from skilled OT services to address deficits.     OT Assessment Results: Decreased ADL status, Decreased upper extremity range of motion, Decreased upper  extremity strength, Decreased IADLs    Plan:  Frequency: 1x a week  Duration: 12x weeks  Occupational therapy intervention plan to include education/instruction, manual therapy, neuromuscular re-education, orthotic fitting/training, self-care/home management, therapeutic exercises, therapeutic activities, and home program.      Goals:  Active       OT Goals       LTG - Patient will indicate/ demonstrate the ability to resume all preinjury ADLs and IADLs without significant limits secondary to decreased ROM, decreased strength and/or pain as indicated by Quickdash score of less than 25%.        Start:  05/05/25    Expected End:  07/28/25            Develop and issue HEP to help maximize ROM, strength and tolerance to help maximize return to all pre-onset activities.        Start:  05/05/25    Expected End:  07/28/25            Pt will demonstrate increased  strength as appropriate with the B  to increase by 2-5# to help patient resume ADLs and IADLs.'       Start:  05/05/25    Expected End:  07/28/25            Pt will demonstrate increased FMC in R hand as indicated by increased 9HPT score 2-5 seconds as compared to L hand.        Start:  05/05/25    Expected End:  07/28/25            Pt will demonstrate increased strength in BUEs to 4+/5 throughout as needed to complete ADL tasks with increased independence.        Start:  05/05/25    Expected End:  07/28/25

## 2025-05-06 ENCOUNTER — TREATMENT (OUTPATIENT)
Dept: PHYSICAL THERAPY | Facility: HOSPITAL | Age: 70
End: 2025-05-06
Payer: MEDICARE

## 2025-05-06 DIAGNOSIS — R26.89 IMPAIRMENT OF BALANCE: ICD-10-CM

## 2025-05-06 DIAGNOSIS — R26.89 SHUFFLING GAIT: ICD-10-CM

## 2025-05-06 DIAGNOSIS — G20.A2 PARKINSON'S DISEASE WITHOUT DYSKINESIA, WITH FLUCTUATING MANIFESTATIONS: ICD-10-CM

## 2025-05-06 DIAGNOSIS — R29.898 BILATERAL LEG WEAKNESS: ICD-10-CM

## 2025-05-06 PROCEDURE — 97116 GAIT TRAINING THERAPY: CPT | Mod: GP,CQ

## 2025-05-06 PROCEDURE — 97110 THERAPEUTIC EXERCISES: CPT | Mod: GP,CQ

## 2025-05-06 ASSESSMENT — PAIN - FUNCTIONAL ASSESSMENT: PAIN_FUNCTIONAL_ASSESSMENT: 0-10

## 2025-05-06 ASSESSMENT — PAIN SCALES - GENERAL: PAINLEVEL_OUTOF10: 0 - NO PAIN

## 2025-05-06 NOTE — PROGRESS NOTES
Physical Therapy    Physical Therapy Treatment    Patient Name: Юлия Ku  MRN: [id number]  : 1955  Today's Date: 2025  Time Calculation  Start Time: 1300  Stop Time: 1345  Time Calculation (min): 45 min    PT Therapeutic Procedures Time Entry  Therapeutic Exercise Time Entry: 35  Gait Training Time Entry: 10          VISIT:# 12    Current Problem  Problem List Items Addressed This Visit           ICD-10-CM    Impairment of balance R26.89    Shuffling gait R26.89    Parkinson's disease without dyskinesia, with fluctuating manifestations G20.A2    Bilateral leg weakness R29.898        Subjective     reports pt's freezing is really bad today      Pain  Pain Assessment: 0-10  0-10 (Numeric) Pain Score: 0 - No pain       Objective                 Precautions  Precautions  STEADI Fall Risk Score (The score of 4 or more indicates an increased risk of falling): unchanged  Precautions Comment: fall risk      Treatments:     Date 2025    Visit # #10 #11 #12   REPS             Nustep 10' @L3 10' @L3 10' @L3         // bars amb:  Fwd  Lat  Retro  Tandem  Lat over cones Talib UE  2 laps  2 laps  2 laps    next Talib UE    2 laps  2 laps           Step-taps      Foam stance      Gastroc stretch      HS stretch             Shuttle:  DLP  SLP     4B 20 x 2   3B 10 x 2    Big Gait training 200' x 2   165' x 2   Fww sba/cga 200' x 1  138' 85' x 1  100' x 1   Turns  X x   Sit-stand            Stairs  2 full flights up/down 1-2 HRA Mod A up Min A down    Jain      DGI      5x Sit to Stand      TUG             // bars: LE ex  HR  Marching  Hip abd  Hip ext  HS curls       10 x 2 2#  10 x 2 2#  10 x 2 2#  10 x 2 2#                Car transfer        Turns  Max cues 75% of the time            HEP                             Assessment:   Pt tolerated treatment without complaint of increase in symptoms.  Advanced exercises today. Pt had decreased freezing after doing the shuttle double leg  press.         Plan: cont to improve functional mobility  OP PT Plan  Treatment/Interventions: Education/ Instruction, Manual therapy, Neuromuscular re-education, Therapeutic activities, Therapeutic exercises, Gait training  PT Plan: Skilled PT  PT Frequency: 2 times per week  Duration: 8 weeks  Certification Period Start Date: 03/28/25  Certification Period End Date: 05/23/25  Number of Treatments Authorized: medical necessity  Rehab Potential: Good  Plan of Care Agreement: Patient    Goals:  Active       Parkinson's / Long COVID       1) Independent standing home exercise program with 90% teach back capability by the patient to show progression (Progressing)       Start:  03/28/25    Expected End:  04/25/25            2) Improve static balance Jain score >6 points to reduce her fall risk with standing ADL's (Progressing)       Start:  03/28/25    Expected End:  05/23/25            3) Improve MMT of Talib LE's to >/=4+/5 to improve joint stability with sit-stand transfers w/o UE WB or external assist (Progressing)       Start:  03/28/25    Expected End:  05/23/25            4) Functional Outcome ABC score improves to >/=40% confidence to reduce her FOF (Progressing)       Start:  03/28/25    Expected End:  05/23/25            5) Pt to amb >150ft w/least restrictive device Mod Indep-Sup with >50% decreased freezing occurrences (Progressing)       Start:  03/28/25    Expected End:  05/23/25

## 2025-05-07 ENCOUNTER — TELEPHONE (OUTPATIENT)
Dept: PRIMARY CARE | Facility: CLINIC | Age: 70
End: 2025-05-07
Payer: MEDICARE

## 2025-05-07 DIAGNOSIS — E89.0 POSTABLATIVE HYPOTHYROIDISM: Chronic | ICD-10-CM

## 2025-05-07 DIAGNOSIS — G20.C PARKINSONISM, UNSPECIFIED PARKINSONISM TYPE (MULTI): ICD-10-CM

## 2025-05-07 RX ORDER — CARBIDOPA AND LEVODOPA 25; 100 MG/1; MG/1
TABLET, EXTENDED RELEASE ORAL
Qty: 90 TABLET | Refills: 3 | Status: SHIPPED | OUTPATIENT
Start: 2025-05-07

## 2025-05-07 RX ORDER — LEVOTHYROXINE SODIUM 50 UG/1
CAPSULE ORAL
Qty: 30 CAPSULE | Refills: 0 | Status: SHIPPED | OUTPATIENT
Start: 2025-05-07

## 2025-05-08 ENCOUNTER — APPOINTMENT (OUTPATIENT)
Dept: PULMONOLOGY | Facility: HOSPITAL | Age: 70
End: 2025-05-08
Payer: MEDICARE

## 2025-05-08 VITALS
OXYGEN SATURATION: 93 % | SYSTOLIC BLOOD PRESSURE: 164 MMHG | HEART RATE: 79 BPM | HEIGHT: 64 IN | WEIGHT: 170 LBS | DIASTOLIC BLOOD PRESSURE: 92 MMHG | RESPIRATION RATE: 16 BRPM | BODY MASS INDEX: 29.02 KG/M2

## 2025-05-08 DIAGNOSIS — J45.40 MODERATE PERSISTENT ASTHMA WITHOUT COMPLICATION (HHS-HCC): Primary | ICD-10-CM

## 2025-05-08 DIAGNOSIS — R06.02 SHORTNESS OF BREATH: ICD-10-CM

## 2025-05-08 PROCEDURE — 3008F BODY MASS INDEX DOCD: CPT | Performed by: NURSE PRACTITIONER

## 2025-05-08 PROCEDURE — 99214 OFFICE O/P EST MOD 30 MIN: CPT | Performed by: NURSE PRACTITIONER

## 2025-05-08 PROCEDURE — 1159F MED LIST DOCD IN RCRD: CPT | Performed by: NURSE PRACTITIONER

## 2025-05-08 PROCEDURE — 3077F SYST BP >= 140 MM HG: CPT | Performed by: NURSE PRACTITIONER

## 2025-05-08 PROCEDURE — 3080F DIAST BP >= 90 MM HG: CPT | Performed by: NURSE PRACTITIONER

## 2025-05-08 PROCEDURE — 1036F TOBACCO NON-USER: CPT | Performed by: NURSE PRACTITIONER

## 2025-05-08 PROCEDURE — 4010F ACE/ARB THERAPY RXD/TAKEN: CPT | Performed by: NURSE PRACTITIONER

## 2025-05-08 RX ORDER — AZELASTINE 1 MG/ML
1 SPRAY, METERED NASAL 2 TIMES DAILY
COMMUNITY

## 2025-05-08 RX ORDER — FLUTICASONE PROPIONATE 50 MCG
1 SPRAY, SUSPENSION (ML) NASAL DAILY
COMMUNITY

## 2025-05-08 RX ORDER — ALBUTEROL SULFATE 90 UG/1
2 INHALANT RESPIRATORY (INHALATION) EVERY 4 HOURS PRN
Qty: 18 G | Refills: 11 | Status: SHIPPED | OUTPATIENT
Start: 2025-05-08

## 2025-05-08 ASSESSMENT — ENCOUNTER SYMPTOMS
SHORTNESS OF BREATH: 1
DIZZINESS: 1

## 2025-05-08 NOTE — PROGRESS NOTES
Subjective   Patient ID: Юлия Ku is a 70 y.o. female who presents for follow up shortness of breath.       HPI: Ms. Claros is a 68 yo female with hx of Long Covid since 2021 is referred for further evaluation. Her main complaint is brain fog and also has shortness of breath, dry mouth but denies coughing and wheezing. She also gets occasional hiccups. Her  is concerned about lack of oxygen. She states she does not have brain fog when she lays down but only when she is upright. She has not smoked significantly but may be 1/3 ppd x 2 years but has not smoked since 1978. She did have some second hand smoke exposure at home and worked as a . She denies vaping or drinking alcohol. She has moved from [place] in 2020 and misses being there. Pt's sister lives in [place] and her mother lived here but passed away in Dec 2023. She denies leg swelling, chest pain. She admits to snoring, fatigue and daytime sleepiness. She takes albuterol MDI prn from her  which helps her breathing. She has HST in Nov 2023 which was considered a poor technical study.     Today she is here for follow up. She was started on Advair HFA last visit and here to assess response to this. She was not able to get Advair but is taking Breo. She states she gets shortness of breath on occasion and denies a cough. She uses albuterol about once a day which helps. She was started on CPAP by her PCP and states she does not feel benefit with this. She has no other concerns.     Review of Systems   Respiratory:  Positive for shortness of breath.    Neurological:  Positive for dizziness.   All other systems reviewed and are negative.      Objective   Physical Exam  Vitals and nursing note reviewed.   Constitutional:       Appearance: Normal appearance.   HENT:      Head: Normocephalic.      Nose: Nose normal.      Mouth/Throat:      Pharynx: Oropharynx is clear.   Eyes:      Extraocular Movements: Extraocular movements intact.       Conjunctiva/sclera: Conjunctivae normal.      Pupils: Pupils are equal, round, and reactive to light.   Cardiovascular:      Rate and Rhythm: Normal rate and regular rhythm.      Pulses: Normal pulses.      Heart sounds: Normal heart sounds.   Pulmonary:      Effort: Pulmonary effort is normal.      Breath sounds: Normal breath sounds.   Abdominal:      General: Bowel sounds are normal.      Palpations: Abdomen is soft.   Musculoskeletal:         General: Normal range of motion.      Cervical back: Normal range of motion.   Skin:     General: Skin is warm.   Neurological:      General: No focal deficit present.      Mental Status: She is alert and oriented to person, place, and time. Mental status is at baseline.   Psychiatric:         Mood and Affect: Mood normal.         Behavior: Behavior normal.         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   Iron deficiency  7.   Chronic allergic rhinitis to Dust mites and cat dander  8.   Orthostatic dizziness    Recommendations:  Patient's does not have hx of significant smoking but does have some hyperinflation on Chest Xray. We discussed differential of asthma vs ETS related COPD. She responds to albuterol MDI prn which I will continue. Discussed PFTs today and are c/w our D/D. She was given Breo from allergist and thinks this is helping her SOB, recommend continue with Breo and albuterol prn.     -She has brain fog per chart review. On review of history it appears to be orthostatic dizziness as fog improves when lays down too. She has seen cardiology and follows with neurology.   -Continue iron supplementation  -May benefit with loratadine prn. She states she does not have significant symptoms currently  -Significant fatigue, EDS and has snoring. Mallamapti 3. HST with borderline reducing in O2 but was technically  inadequate as belts did not stay on. This was done and showed no sleep apnea by CMS definition, and mild SIMBA per AASM definition, she did have periodic limb movement that can interrupt her sleep. Recommend continue iron supplementation. Dr. Lundberg had called patient with results and did not recommend CPAP at this time due to very mild SIMBA and no concern for sleep related hypoxia. Recommendation was for side lying sleeping and weight management. She was stared on CPAP by PCP, will defer further management to them.  asking why she has not had improvement in brain fog, I explained that sleep apnea is so mild it would not be causing this. Recommend to continue to follow up with neurology.     Overall she is doing well breathing wise on Breo and albuterol prn. She is getting Breo from allergist. Recommend continue current plan of care. She can follow up with Dr. Lundberg on an as needed basis.

## 2025-05-08 NOTE — PATIENT INSTRUCTIONS
Continue on Breo one puff once a day, everyday.   Continue albuterol inhaler as needed for shortness of breath.  Call with any questions or concerns.   Follow up with Dr. Lundberg as needed.

## 2025-05-09 ENCOUNTER — APPOINTMENT (OUTPATIENT)
Dept: PRIMARY CARE | Facility: CLINIC | Age: 70
End: 2025-05-09
Payer: MEDICARE

## 2025-05-09 ENCOUNTER — TREATMENT (OUTPATIENT)
Dept: PHYSICAL THERAPY | Facility: HOSPITAL | Age: 70
End: 2025-05-09
Payer: MEDICARE

## 2025-05-09 DIAGNOSIS — R29.898 BILATERAL LEG WEAKNESS: ICD-10-CM

## 2025-05-09 DIAGNOSIS — R26.89 IMPAIRMENT OF BALANCE: ICD-10-CM

## 2025-05-09 DIAGNOSIS — G20.A2 PARKINSON'S DISEASE WITHOUT DYSKINESIA, WITH FLUCTUATING MANIFESTATIONS: ICD-10-CM

## 2025-05-09 DIAGNOSIS — R26.89 SHUFFLING GAIT: ICD-10-CM

## 2025-05-09 PROCEDURE — 97110 THERAPEUTIC EXERCISES: CPT | Mod: GP | Performed by: PHYSICAL THERAPIST

## 2025-05-09 PROCEDURE — 97530 THERAPEUTIC ACTIVITIES: CPT | Mod: GP | Performed by: PHYSICAL THERAPIST

## 2025-05-09 ASSESSMENT — PAIN - FUNCTIONAL ASSESSMENT: PAIN_FUNCTIONAL_ASSESSMENT: 0-10

## 2025-05-09 ASSESSMENT — PAIN SCALES - GENERAL: PAINLEVEL_OUTOF10: 0 - NO PAIN

## 2025-05-09 NOTE — PROGRESS NOTES
Physical Therapy    Physical Therapy Treatment    Patient Name: Юлия Ku  MRN: [id number]  : 1955  Today's Date: 2025  Time Calculation  Start Time: 1300  Stop Time: 1346  Time Calculation (min): 46 min    PT Therapeutic Procedures Time Entry  Therapeutic Exercise Time Entry: 10  Therapeutic Activity Time Entry: 36        VISIT:# 13    Current Problem  Problem List Items Addressed This Visit           ICD-10-CM    Impairment of balance R26.89    Shuffling gait R26.89    Parkinson's disease without dyskinesia, with fluctuating manifestations G20.A2    Bilateral leg weakness R29.898        Subjective    Pt denies any pain today and  denies any falls. He requests to work on turning in PT today. He states she is walking better at home, but then freezes constantly with turns, as if getting to the toilet.     Pain  Pain Assessment: 0-10  0-10 (Numeric) Pain Score: 0 - No pain     Objective    Sit-stand then lateral side stepping along mat table to change sitting position   Multiple transfers to variable chair heights and sitting surfaces           Precautions  Precautions  STEADI Fall Risk Score (The score of 4 or more indicates an increased risk of falling): unchanged  Precautions Comment: fall risk    Treatments:  Date 2025   Visit # #10 #11 #12 #13   REPS               Nustep 10' @L3 10' @L3 10' @L3           // bars amb:  Fwd  Lat  Retro  Tandem  Lat over cones Talib UE  2 laps  2 laps  2 laps    next Talib UE    2 laps  2 laps     0 Ue's 3 laps          Step-taps       Foam stance       Gastroc stretch       HS stretch               Shuttle: DLP  SLP   4B 20 x 2   3B 10 x 2  4B 20x2  3B 2x15 ea    Big Gait training 200' x 2   165' x 2   Fww sba/cga 200' x 1  138' 85' x 1  100' x 1 With and w/o FWW in clinic   Turns  X x X in // bars and in gym using FWW   Sit-stand       Transfers    9x throughout clinic varying seat heights   Stairs  2 full flights up/down 1-2  HRA Mod A up Min A down     Jain       DGI       5x Sit to Stand       TUG               // bars: LE ex  HR  Marching  Hip abd  Hip ext  HS curls       10 x 2 2#  10 x 2 2#  10 x 2 2#  10 x 2 2#                   Car transfer         Turns  Max cues 75% of the time              HEP                       Assessment:   Pt was able to perform tighter turns in the // bars w/o UE, needing VC's to stop and think about which foot to move first depending on which direction she turns. Increased freezing noted with turning while amb w/FWW and she makes very wide turns. Max VC's needed for turning her feet and FWW in space vs wide turns. Pt needed cues to stop and pull her walker back when freezing begins as this helps to stop motion and then she can reset and resume large steps.     Plan:    Monitor turning safety with and without walker    Goals:  Active       Parkinson's / Long COVID       1) Independent standing home exercise program with 90% teach back capability by the patient to show progression (Progressing)       Start:  03/28/25    Expected End:  04/25/25            2) Improve static balance Jain score >6 points to reduce her fall risk with standing ADL's (Progressing)       Start:  03/28/25    Expected End:  05/23/25            3) Improve MMT of Talib LE's to >/=4+/5 to improve joint stability with sit-stand transfers w/o UE WB or external assist (Progressing)       Start:  03/28/25    Expected End:  05/23/25            4) Functional Outcome ABC score improves to >/=40% confidence to reduce her FOF (Progressing)       Start:  03/28/25    Expected End:  05/23/25            5) Pt to amb >150ft w/least restrictive device Mod Indep-Sup with >50% decreased freezing occurrences (Progressing)       Start:  03/28/25    Expected End:  05/23/25

## 2025-05-13 ENCOUNTER — TREATMENT (OUTPATIENT)
Dept: OCCUPATIONAL THERAPY | Facility: HOSPITAL | Age: 70
End: 2025-05-13
Payer: MEDICARE

## 2025-05-13 ENCOUNTER — TREATMENT (OUTPATIENT)
Dept: PHYSICAL THERAPY | Facility: HOSPITAL | Age: 70
End: 2025-05-13
Payer: MEDICARE

## 2025-05-13 ENCOUNTER — TELEPHONE (OUTPATIENT)
Dept: INTERNAL MEDICINE | Facility: HOSPITAL | Age: 70
End: 2025-05-13

## 2025-05-13 DIAGNOSIS — R26.89 SHUFFLING GAIT: ICD-10-CM

## 2025-05-13 DIAGNOSIS — R26.89 IMPAIRMENT OF BALANCE: ICD-10-CM

## 2025-05-13 DIAGNOSIS — R27.8 ABNORMAL COORDINATION: Primary | ICD-10-CM

## 2025-05-13 DIAGNOSIS — G20.A2 PARKINSON'S DISEASE WITHOUT DYSKINESIA, WITH FLUCTUATING MANIFESTATIONS: ICD-10-CM

## 2025-05-13 DIAGNOSIS — R53.1 WEAKNESS: ICD-10-CM

## 2025-05-13 DIAGNOSIS — R29.898 BILATERAL LEG WEAKNESS: ICD-10-CM

## 2025-05-13 LAB
ALBUMIN SERPL-MCNC: 4.3 G/DL (ref 3.6–5.1)
ALP SERPL-CCNC: 57 U/L (ref 37–153)
ALT SERPL-CCNC: 16 U/L (ref 6–29)
ANION GAP SERPL CALCULATED.4IONS-SCNC: 11 MMOL/L (CALC) (ref 7–17)
AST SERPL-CCNC: 11 U/L (ref 10–35)
BASOPHILS # BLD AUTO: 41 CELLS/UL (ref 0–200)
BASOPHILS NFR BLD AUTO: 0.8 %
BILIRUB SERPL-MCNC: 0.7 MG/DL (ref 0.2–1.2)
BUN SERPL-MCNC: 13 MG/DL (ref 7–25)
CALCIUM SERPL-MCNC: 9.5 MG/DL (ref 8.6–10.4)
CHLORIDE SERPL-SCNC: 103 MMOL/L (ref 98–110)
CHOLEST SERPL-MCNC: 289 MG/DL
CHOLEST/HDLC SERPL: 5 (CALC)
CO2 SERPL-SCNC: 25 MMOL/L (ref 20–32)
CREAT SERPL-MCNC: 0.86 MG/DL (ref 0.5–1.05)
EGFRCR SERPLBLD CKD-EPI 2021: 74 ML/MIN/1.73M2
EOSINOPHIL # BLD AUTO: 61 CELLS/UL (ref 15–500)
EOSINOPHIL NFR BLD AUTO: 1.2 %
ERYTHROCYTE [DISTWIDTH] IN BLOOD BY AUTOMATED COUNT: 12.9 % (ref 11–15)
GLUCOSE SERPL-MCNC: 155 MG/DL (ref 65–139)
HCT VFR BLD AUTO: 44 % (ref 35–45)
HDLC SERPL-MCNC: 58 MG/DL
HGB BLD-MCNC: 14.5 G/DL (ref 11.7–15.5)
LDLC SERPL CALC-MCNC: 199 MG/DL (CALC)
LYMPHOCYTES # BLD AUTO: 1290 CELLS/UL (ref 850–3900)
LYMPHOCYTES NFR BLD AUTO: 25.3 %
MCH RBC QN AUTO: 35.8 PG (ref 27–33)
MCHC RBC AUTO-ENTMCNC: 33 G/DL (ref 32–36)
MCV RBC AUTO: 108.6 FL (ref 80–100)
MONOCYTES # BLD AUTO: 316 CELLS/UL (ref 200–950)
MONOCYTES NFR BLD AUTO: 6.2 %
NEUTROPHILS # BLD AUTO: 3392 CELLS/UL (ref 1500–7800)
NEUTROPHILS NFR BLD AUTO: 66.5 %
NONHDLC SERPL-MCNC: 231 MG/DL (CALC)
PLATELET # BLD AUTO: 312 THOUSAND/UL (ref 140–400)
PMV BLD REES-ECKER: 9.9 FL (ref 7.5–12.5)
POTASSIUM SERPL-SCNC: 4.4 MMOL/L (ref 3.5–5.3)
PROT SERPL-MCNC: 6.5 G/DL (ref 6.1–8.1)
RBC # BLD AUTO: 4.05 MILLION/UL (ref 3.8–5.1)
SODIUM SERPL-SCNC: 139 MMOL/L (ref 135–146)
T4 FREE SERPL-MCNC: 1.3 NG/DL (ref 0.8–1.8)
TRIGL SERPL-MCNC: 155 MG/DL
TSH SERPL-ACNC: 23.56 MIU/L (ref 0.4–4.5)
WBC # BLD AUTO: 5.1 THOUSAND/UL (ref 3.8–10.8)

## 2025-05-13 PROCEDURE — 97530 THERAPEUTIC ACTIVITIES: CPT | Mod: GP | Performed by: PHYSICAL THERAPIST

## 2025-05-13 PROCEDURE — 97530 THERAPEUTIC ACTIVITIES: CPT | Mod: GO,CO

## 2025-05-13 PROCEDURE — 97112 NEUROMUSCULAR REEDUCATION: CPT | Mod: GP | Performed by: PHYSICAL THERAPIST

## 2025-05-13 ASSESSMENT — PAIN SCALES - GENERAL: PAINLEVEL_OUTOF10: 0 - NO PAIN

## 2025-05-13 ASSESSMENT — PAIN - FUNCTIONAL ASSESSMENT: PAIN_FUNCTIONAL_ASSESSMENT: 0-10

## 2025-05-13 NOTE — PROGRESS NOTES
Occupational Therapy    OT Treatment    Patient Name: Юлия Ku  MRN: [id number]  Today's Date: 5/13/2025  Visit# 6  Time Calculation  Start Time: 1345  Stop Time: 1430  Time Calculation (min): 45 min             Therapeutic Codes:  OT Therapeutic Procedures Time Entry  Therapeutic Activity Time Entry: 45  Current Problem:  1. Abnormal coordination        2. Parkinson's disease without dyskinesia, with fluctuating manifestations  Follow Up In Occupational Therapy      3. Weakness          Assessment:  Pt shown fatigue in R hand with in hand manipulation activities and pt reports her hand started to shake. Pt reports no change in pain leaving OT tx session and received hand writing worksheets for home and HEP.  OT Assessment Results: Decreased ADL status, Decreased upper extremity range of motion, Decreased upper extremity strength, Decreased IADLs    Plan:  Pt to continue with FM coordination and hand and UB strengthening to progress ADL and IADL performances.     Subjective   Pt reports she has been doing HEP activities at home with coins and T putty. Pt reports she didn't bring in T putty to therapy session today. Pt and pt's spouse reports they want to get patient's handwriting better and be able to push her self up out of bed from a lying position.     Pain:  Pain Assessment  Pain Assessment: 0-10  0-10 (Numeric) Pain Score: 0 - No pain    Objective     5/5/2025 5/13/25   Exercises: Reps:                        Activities:     Pink t-putty 1x5 composite flexion, isolated pinch, digit abduction    In hand manipulation Palm to finger, finger to palm translation 1x10 B hands. Pt instructed to stack pennies. Increased difficulty.  Palm to finger, finger to palm translation 1x10 PARISH hands. Pt instructed to stack pennies/bingo markers Increased difficulty 2 drops    Ball Manipulation 1x5 minutes with CW and CCW with R hand only 6 drops (hand shaking and fatigue)   Theraband     Hand strengthening   30# 2x10 reps  with PARISH hands with shoulder at 90 degree holds     Hand writting work sheets: Name writing, sentences, alphabet         HEP provided on HEP provided to pt including pink t-putty. Pt instructed to complete 1x a day, 5-10 reps within pain free range. Handouts provided to pt.     Modalities:                         Manual:                         Functional review:      Completed on: 5/5/2025 OT evaluation            OP EDUCATION:  Education  Individual(s) Educated: Patient  Education Provided: Diagnosis & Precautions, Symptom management, Fall precautons, POC discussed and agreed upon, Risk and benefits of OT discussed with patient or other  Home Program: [facility], Handout issued  Risk and Benefits Discussed with Patient/Caregiver/Other: yes  Patient/Caregiver Demonstrated Understanding: yes  Plan of Care Discussed and Agreed Upon: yes  Patient Response to Education: Patient/Caregiver Verbalized Understanding of Information  Education Comment:  (Pt and spouse educated and performed in hand manipulation with FM coordination with ball manipulation and added to HEP.)    Goals:  long covid- occupational therapy Problems       long covid- occupational therapy Problems (Active)       OT Problem       OT-Patient will manage fatigue independently through use of fatigue traffic light and other fatigue strategies to allow participation in ADL/IADL/leisure with fatigue rating of 4/10(yellow) or less.        Start:  03/13/24    Expected End:  06/14/24            Patient will complete complex IADL independently using cognitive and fatigue management strategies        Start:  03/13/24    Expected End:  06/14/24            OT- Patient will use memory and planning system independently as a compensatory tool for management of day, memory and planning.        Start:  03/13/24    Expected End:  06/14/24            Patient will use memory strategies independently to allow improved ability to manage appointments, remember errands,and to  improve working memory.       Start:  03/13/24    Expected End:  06/14/24            OT-Patient will demonstrate good recall and understanding of written material through use of PQRST and other reading strategies.        Start:  03/13/24    Expected End:  06/14/24            Patient will complete self care with use of compensatory tools/techniques and energy conservation techniques to allow decreased fatigue..       Start:  03/13/24    Expected End:  06/14/24            Patient will demonstrate improved writing demonstrating 25% improvement in legibility and ability to mentally process writing.       Start:  03/13/24    Expected End:  06/14/24            Patient will demonstrated improved functional communication through use of compensatory tools/techniques and improved executive function.       Start:  03/13/24    Expected End:  06/14/24            Patient will participate in Leisure tasks independently including walking through use of fatigue management, energy conservation and use of compensatory tools/techniques.       Start:  03/13/24    Expected End:  06/14/24            Patient with demonstrate 4+/5 bilateral UE strength and independent HEP completion.       Start:  03/13/24                     OT EVAL 5/5/25 Problems       OT EVAL 5/5/25 Problems (Active)       OT Goals       LTG - Patient will indicate/ demonstrate the ability to resume all preinjury ADLs and IADLs without significant limits secondary to decreased ROM, decreased strength and/or pain as indicated by Quickdash score of less than 25%.        Start:  05/05/25    Expected End:  07/28/25            Develop and issue HEP to help maximize ROM, strength and tolerance to help maximize return to all pre-onset activities.        Start:  05/05/25    Expected End:  07/28/25            Pt will demonstrate increased  strength as appropriate with the B  to increase by 2-5# to help patient resume ADLs and IADLs.'       Start:  05/05/25    Expected  End:  07/28/25            Pt will demonstrate increased FMC in R hand as indicated by increased 9HPT score 2-5 seconds as compared to L hand.        Start:  05/05/25    Expected End:  07/28/25            Pt will demonstrate increased strength in BUEs to 4+/5 throughout as needed to complete ADL tasks with increased independence.        Start:  05/05/25    Expected End:  07/28/25

## 2025-05-13 NOTE — PROGRESS NOTES
Physical Therapy    Physical Therapy Treatment    Patient Name: Юлия Ku  MRN: [id number]  : 1955  Today's Date: 2025  Time Calculation  Start Time: 1300  Stop Time: 1348  Time Calculation (min): 48 min    PT Therapeutic Procedures Time Entry  Neuromuscular Re-Education Time Entry: 18  Therapeutic Activity Time Entry: 30        VISIT:# 14    Current Problem  Problem List Items Addressed This Visit           ICD-10-CM    Impairment of balance R26.89    Shuffling gait R26.89    Parkinson's disease without dyskinesia, with fluctuating manifestations G20.A2    Bilateral leg weakness R29.898        Subjective    Pt and  present with reports of her running out of her normal carbidopa-levodopa day time meds and took a 1/2 of the extended release night time dosage to get by. However this pill makes her extremely tired and she reports being more out of it today.     Pain   Pain Assessment: 0-10  0-10 (Numeric) Pain Score: 0 - No pain     Objective    Challenged sit-stand transfers from low chair sitting on a pillow    Amb >400ft on level surface, down/up ramp with FWW CG-SBA with 1 rest break before coming back up the ramp           Precautions   Precautions  STEADI Fall Risk Score (The score of 4 or more indicates an increased risk of falling): unchanged  Precautions Comment: fall risk    Treatments:  Date 2025   Visit # #10 #11 #12 #13    REPS                 Nustep 10' @L3 10' @L3 10' @L3             // bars amb:  Fwd  Lat  Retro  Tandem  Lat over cones Talib UE  2 laps  2 laps  2 laps    next Talib UE    2 laps  2 laps     0 Ue's 3 laps   3 laps 0 Ue's  2 laps 1 UE           Step-taps        Foam stance        Gastroc stretch        HS stretch                 Shuttle: DLP  SLP   4B 20 x 2   3B 10 x 2  4B 20x2  3B 2x15 ea     Big Gait training 200' x 2   165' x 2   Fww sba/cga 200' x 1  138' 85' x 1  100' x 1 With and w/o FWW in clinic With FWW in clinic and  hallway up/down ramp >400ft   Turns  X x X in // bars and in gym using FWW Multiple in the gym w/FWW max VC's   Sit-stand     10x low chair w/pillow   Transfers    9x throughout clinic varying seat heights    Stairs  2 full flights up/down 1-2 HRA Mod A up Min A down      Jain        DGI        5x Sit to Stand        TUG                 // bars: LE ex  HR  Marching  Hip abd  Hip ext  HS curls       10 x 2 2#  10 x 2 2#  10 x 2 2#  10 x 2 2#                      Car transfer          Turns  Max cues 75% of the time                HEP           Assessment:   Pt tolerated sit-stand from lower height chair today with pillow on top to simulate her soft recliner that she has trouble getting out of at home. She continues to require VC's to lean forward and sit-stand with increased amplitude. Increased walking tolerance on level and down/up ramp w/FWW, but gait festination worsens with increasing fatigue. Pt required multiple cues to stop and reset her gait due to shortening step length and poor floor clearance. Discussed that she needs to use her FWW to walk at home. Planning to discharge at the end of this month due to coming to a plateau and pt was agreeable.     Plan:    Continue to challenge pt's turning ability and ambulation endurance    Goals:  Active       Parkinson's / Long COVID       1) Independent standing home exercise program with 90% teach back capability by the patient to show progression (Progressing)       Start:  03/28/25    Expected End:  04/25/25            2) Improve static balance Jain score >6 points to reduce her fall risk with standing ADL's (Progressing)       Start:  03/28/25    Expected End:  05/23/25            3) Improve MMT of Talib LE's to >/=4+/5 to improve joint stability with sit-stand transfers w/o UE WB or external assist (Progressing)       Start:  03/28/25    Expected End:  05/23/25            4) Functional Outcome ABC score improves to >/=40% confidence to reduce her FOF (Progressing)        Start:  03/28/25    Expected End:  05/23/25            5) Pt to amb >150ft w/least restrictive device Mod Indep-Sup with >50% decreased freezing occurrences (Progressing)       Start:  03/28/25    Expected End:  05/23/25

## 2025-05-13 NOTE — TELEPHONE ENCOUNTER
Left message for patient to check her mychart messages. Was attempting to call because her thyroid value is still not appropriate. Suggest incresae dose again but need to speak to patient to determine how much to increase. I will see her next week in the office.       Mi Fox MD PhD

## 2025-05-15 ENCOUNTER — TELEPHONE (OUTPATIENT)
Dept: PRIMARY CARE | Facility: CLINIC | Age: 70
End: 2025-05-15

## 2025-05-15 ENCOUNTER — APPOINTMENT (OUTPATIENT)
Facility: CLINIC | Age: 70
End: 2025-05-15
Payer: MEDICARE

## 2025-05-15 VITALS
HEART RATE: 98 BPM | DIASTOLIC BLOOD PRESSURE: 85 MMHG | HEIGHT: 63 IN | TEMPERATURE: 96.8 F | SYSTOLIC BLOOD PRESSURE: 151 MMHG | BODY MASS INDEX: 29.77 KG/M2 | WEIGHT: 168 LBS

## 2025-05-15 DIAGNOSIS — E89.0 POSTABLATIVE HYPOTHYROIDISM: Primary | Chronic | ICD-10-CM

## 2025-05-15 DIAGNOSIS — E89.0 POSTABLATIVE HYPOTHYROIDISM: Chronic | ICD-10-CM

## 2025-05-15 DIAGNOSIS — R71.8 ELEVATED MCV: Primary | ICD-10-CM

## 2025-05-15 DIAGNOSIS — G23.1 PROGRESSIVE SUPRANUCLEAR PALSY (MULTI): Primary | ICD-10-CM

## 2025-05-15 PROCEDURE — 3077F SYST BP >= 140 MM HG: CPT | Performed by: PSYCHIATRY & NEUROLOGY

## 2025-05-15 PROCEDURE — 4010F ACE/ARB THERAPY RXD/TAKEN: CPT | Performed by: PSYCHIATRY & NEUROLOGY

## 2025-05-15 PROCEDURE — 1126F AMNT PAIN NOTED NONE PRSNT: CPT | Performed by: PSYCHIATRY & NEUROLOGY

## 2025-05-15 PROCEDURE — 1160F RVW MEDS BY RX/DR IN RCRD: CPT | Performed by: PSYCHIATRY & NEUROLOGY

## 2025-05-15 PROCEDURE — G8433 SCR FOR DEP NOT CPT DOC RSN: HCPCS | Performed by: PSYCHIATRY & NEUROLOGY

## 2025-05-15 PROCEDURE — 1159F MED LIST DOCD IN RCRD: CPT | Performed by: PSYCHIATRY & NEUROLOGY

## 2025-05-15 PROCEDURE — 1036F TOBACCO NON-USER: CPT | Performed by: PSYCHIATRY & NEUROLOGY

## 2025-05-15 PROCEDURE — 3008F BODY MASS INDEX DOCD: CPT | Performed by: PSYCHIATRY & NEUROLOGY

## 2025-05-15 PROCEDURE — 99214 OFFICE O/P EST MOD 30 MIN: CPT | Performed by: PSYCHIATRY & NEUROLOGY

## 2025-05-15 PROCEDURE — 3079F DIAST BP 80-89 MM HG: CPT | Performed by: PSYCHIATRY & NEUROLOGY

## 2025-05-15 PROCEDURE — G2211 COMPLEX E/M VISIT ADD ON: HCPCS | Performed by: PSYCHIATRY & NEUROLOGY

## 2025-05-15 ASSESSMENT — ENCOUNTER SYMPTOMS
DEPRESSION: 0
OCCASIONAL FEELINGS OF UNSTEADINESS: 1
LOSS OF SENSATION IN FEET: 0

## 2025-05-15 ASSESSMENT — PAIN SCALES - GENERAL: PAINLEVEL_OUTOF10: 0-NO PAIN

## 2025-05-15 NOTE — PROGRESS NOTES
Subjective      Юлия Ku is a 70 y.o. year old female is here for follow up for gait ataxia/festination of gait.       Parkinson's Disease  She has had 2 years of progressive gait issues.  At that time she fell down 11 steps.  She is in a wheelchair.  Falling is frequent. Falls forward.   Short term memory , orientation is affected.  Forgetting conversations quickly.   No major head injuries.    She has lived in [place] for 4 years and previously was in [place].  She has had adjustments to her diabetic medications as well as her thyroid medications.  No double vision.  No speech changes.   She was seen by neurologists, Dr. Carrizales and Dr. Willard,  at [facility] diagnosed with parkinsonism and freezing of gait.  Patient and her  felt that the levodopa has helped her freezing.  She does feel like her freezing is worsening overnight when she wakes up to use the bathroom.  She refused to do the neuropsychology testing.  No tremors.  Sometimes her legs will be restless at times when sitting.  No dyskinesia.    Sinemet 25-100mg 1.5 tabs QID- has helped.  No adverse effects.  No hallucinations.    Has brain fog since COVID 2021.   She does have slightly high cholesterol.  Patient had a brain MRI in 2023 at [facility]. Her b6 was low.   She has igg infusions weekly. 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PASC)    Orthostatic hypotension    Abnormal echocardiography    History of Graves' disease    Immunologic deficiency syndrome (Multi)    Impairment of balance    Shuffling gait    Hypogammaglobulinemia (Multi)    Cervical myelopathy with cervical radiculopathy    Polyneuropathy associated with underlying disease    Hypertension associated with diabetes    Chronic  sclerosing sialadenitis    Dizziness    Brain fog    SIMBA (obstructive sleep apnea)    Parkinson's disease without dyskinesia, with fluctuating manifestations    Bilateral leg weakness    Abnormal coordination    Weakness     Past Medical History:   Diagnosis Date    Allergic     Arthritis     Difficulty walking 2023    Disease of thyroid gland graves     Dizziness 10/2023    Ear pain 2024    Ear problems     Fatigue 2023    Graves disease 2008    Head injury 2022    Hypertension     Hypothyroidism     Movement disorder 2022    Personal history of irradiation     Status post radioactive iodine thyroid ablation    Personal history of other diseases of the circulatory system     History of hypertension    Personal history of other endocrine, nutritional and metabolic disease     History of type 2 diabetes mellitus    Personal history of other endocrine, nutritional and metabolic disease     History of Graves' disease    Personal history of other endocrine, nutritional and metabolic disease     History of hyperlipidemia    Personal history of other endocrine, nutritional and metabolic disease     History of hypothyroidism    Right ventricular dysfunction 2024    Sleep difficulties 2020    Snoring     Type 2 diabetes mellitus 2008     Past Surgical History:   Procedure Laterality Date    BREAST BIOPSY  2008    CARDIAC CATHETERIZATION      OTHER SURGICAL HISTORY  2021    No history of surgery     Social History     Tobacco Use    Smoking status: Former     Current packs/day: 0.00     Average packs/day: 0.3 packs/day for 2.0 years (0.5 ttl pk-yrs)     Types: Cigarettes     Start date: 1976     Quit date: 1978     Years since quittin.9    Smokeless tobacco: Never   Substance Use Topics    Alcohol use: Never     family history includes Alzheimer's disease in her mother; Breast cancer in her mother; Cancer in her mother; Dementia in her mother; Heart disease in  her maternal grandfather; Hypertension in her father and mother; Kidney disease in her father.    Current Outpatient Medications:     albuterol 90 mcg/actuation inhaler, Inhale 2 puffs every 4 hours if needed for shortness of breath., Disp: 18 g, Rfl: 11    azelastine (Astelin) 137 mcg (0.1 %) nasal spray, Administer 1 spray into each nostril 2 times a day. Use in each nostril as directed, Disp: , Rfl:     carbidopa-levodopa (Sinemet CR)  mg ER tablet, Take 1 tab at bedtime, Disp: 90 tablet, Rfl: 3    carbidopa-levodopa (Sinemet)  mg tablet, Take 1.5 tabs QID, Disp: 360 tablet, Rfl: 3    cycloSPORINE (Restasis) 0.05 % ophthalmic emulsion, Administer 1 drop into both eyes every 12 hours., Disp: , Rfl:     EPINEPHrine 0.3 mg/0.3 mL injection syringe, Inject 0.3 mL (0.3 mg) into the muscle 1 time if needed., Disp: , Rfl:     fluticasone (Flonase) 50 mcg/actuation nasal spray, Administer 1 spray into each nostril once daily. Shake gently. Before first use, prime pump. After use, clean tip and replace cap., Disp: , Rfl:     fluticasone furoate-vilanteroL (Breo Ellipta) 100-25 mcg/dose inhaler, Inhale 1 puff once daily., Disp: 1 each, Rfl: 3    FreeStyle Srinivas 3 Sensor device, USE AS DIRECTED TO CHECK BLOOD GLUCOSE. CHANGE EVERY 14 DAYS, Disp: , Rfl:     immun glob G,IgG,/pro/IgA 0-50 (HIZENTRA SUBQ), Inject 10 g under the skin 1 (one) time per week., Disp: , Rfl:     losartan (Cozaar) 50 mg tablet, Take 1 tablet (50 mg) by mouth once daily., Disp: 90 tablet, Rfl: 1    NON FORMULARY, Glia Plasmalogens, Disp: , Rfl:     omega-3 fatty acids (SUPER OMEGA-3 ORAL), Take by mouth., Disp: , Rfl:     pilocarpine (Salagen) 5 mg tablet, TAKE 1 TABLET(5 MG) BY MOUTH THREE TIMES DAILY, Disp: 270 tablet, Rfl: 1    PreviDent 5000 Dry Mouth 1.1 % dental paste, Apply to teeth once daily., Disp: , Rfl:     Tirosint 50 mcg capsule, TAKE ONE CAPSULE BY MOUTH EVERY MORNING ON AN EMPTY STOMACH 30-60MIN PRIOR TO BREAKFAST (Patient  "not taking: Reported on 5/15/2025), Disp: 30 capsule, Rfl: 0  Allergies   Allergen Reactions    Mold Shortness of breath    Codeine Hives and Swelling    Lisinopril Cough     /85 (BP Location: Right arm, Patient Position: Sitting)   Pulse 98   Temp 36 °C (96.8 °F)   Ht 1.6 m (5' 3\")   Wt 76.2 kg (168 lb)   BMI 29.76 kg/m²   Neurological Exam/Physical Exam:    Constitutional: General appearance: no acute distress. Pleasant.   Auscultation of Heart: Regular rate and rhythm, no murmurs, normal S1 and S2.   Carotid Arteries: no bruits  Peripheral Vascular Exam: No swelling in extremities  Mental status: no distress, alert, interactive and cooperative. Affect is appropriate.   Attention: normal attention  Patient at times is confused.  She does make paraphasic errors.  Fund of knowledge: Patient displays adequate knowledge of current events.  Eyes: The ophthalmoscopic examination was normal.   Cranial nerve II: Visual fields full to confrontation.   Cranial nerves III, IV, and VI: Pupils round, equally reactive to light; EOMs intact. No nystagmus.   Cranial Nerve V: Facial sensation intact to LT bilaterally.   Cranial nerve VII: no facial droop  Cranial nerve VIII: Hearing is intact  Cranial nerves IX and X: Palate elevates symmetrically.   Cranial nerve XI: Shoulder shrug intact.   Cranial nerve XII: Tongue is midline.  Motor:  Muscle bulk was normal in both upper and lower extremities.    No atrophy.   Strength is normal.  No resting tremor.  No rigidity or bradykinesia.  Deep Tendon Reflexes: left biceps 2+ , right biceps 2+, left triceps 2+, right triceps 2+, left brachioradialis 2+, right brachioradialis 2+, left patella 2+, right patella 2+, left ankle jerk 2+, right ankle jerk 2+   Plantar Reflex: Toes downgoing to plantar stimulation on the left. Toes downgoing to plantar stimulation on the right.   Sensory Exam: Normal to vibratory sensation  Coordination:  no limb dystaxia  Gait: she does have " freezing of RLE with initiation of gait as well as turns And hallways, leans forward and has festination of gait. She has severe postural instability.       Labs:  CBC:   Lab Results   Component Value Date    WBC 5.1 05/12/2025    HGB 14.5 05/12/2025    HCT 44.0 05/12/2025     05/12/2025     BMP:   Lab Results   Component Value Date     05/12/2025    K 4.4 05/12/2025     05/12/2025    CO2 25 05/12/2025    BUN 13 05/12/2025    CREATININE 0.86 05/12/2025    CALCIUM 9.5 05/12/2025     LFT:   Lab Results   Component Value Date    ALKPHOS 57 05/12/2025    BILITOT 0.7 05/12/2025    PROT 6.5 05/12/2025    ALBUMIN 4.3 05/12/2025    ALT 16 05/12/2025    AST 11 05/12/2025    GGT 12 09/26/2024       Labs were viewed personally.   Kidney function normal.  CBC shows no signs of anemia.  Electrolytes normal.  LFTS are also unremarkable.       Assessment/Plan   Problem List Items Addressed This Visit    None  Trial kynmobi for FOG.  ? Autoimmune etiology.   Possible PSP versus festination of gait.  Sinemet has been helping her.    Will order physical therapy and try Sinemet ER in the evening.    This is a chronic neurologic condition that requires ongoing care and monitoring. This is a complex, serious condition that needs long term care going forward. Between myself and the patient we will be changing direction of care depending on responses to treatment.   Today we discussed medication options, non medication options for management and various other symptoms that are in relation to this disease.  I will continue to be involved in the care of this patient.

## 2025-05-15 NOTE — PATIENT INSTRUCTIONS
"It was a pleasure seeing you today.     Apomorphine sublingual film (brand name Jacobmobi) .    ( Injection version is called Apokyn)     Please make a follow up appointment in 5 months.  You may also schedule a phone or virtual visit sooner on a Friday morning with me as needed before the next clinic appointment.     For any urgent issues or needing to speak to a medical assistant please call [phone], option 6 during our office hours Monday-Friday 8am-4pm, and leave a voicemail with your concern.  My office will try to reach back you as soon as possible within 24 (business) hours.  If you have an emergency please call 911 or visit a local urgent care or nearest emergency room.      Please understand that [employer] is a useful communication tool for simple \"normal\" results or a refill request but I would not recommend using this tool for emergent or urgent issues or for conversations with me.  I am happy to ask my staff to rearrange a follow up visit or a virtual visit sooner than requested if appropriate for your care.    "

## 2025-05-16 ENCOUNTER — TREATMENT (OUTPATIENT)
Dept: PHYSICAL THERAPY | Facility: HOSPITAL | Age: 70
End: 2025-05-16
Payer: MEDICARE

## 2025-05-16 DIAGNOSIS — R29.898 BILATERAL LEG WEAKNESS: ICD-10-CM

## 2025-05-16 DIAGNOSIS — R26.89 SHUFFLING GAIT: ICD-10-CM

## 2025-05-16 DIAGNOSIS — R26.89 IMPAIRMENT OF BALANCE: ICD-10-CM

## 2025-05-16 DIAGNOSIS — E89.0 POSTABLATIVE HYPOTHYROIDISM: Chronic | ICD-10-CM

## 2025-05-16 DIAGNOSIS — G20.A2 PARKINSON'S DISEASE WITHOUT DYSKINESIA, WITH FLUCTUATING MANIFESTATIONS: ICD-10-CM

## 2025-05-16 PROCEDURE — 97110 THERAPEUTIC EXERCISES: CPT | Mod: GP,KX | Performed by: PHYSICAL THERAPIST

## 2025-05-16 PROCEDURE — 97530 THERAPEUTIC ACTIVITIES: CPT | Mod: GP,KX | Performed by: PHYSICAL THERAPIST

## 2025-05-16 ASSESSMENT — PAIN SCALES - GENERAL: PAINLEVEL_OUTOF10: 0 - NO PAIN

## 2025-05-16 ASSESSMENT — PAIN - FUNCTIONAL ASSESSMENT: PAIN_FUNCTIONAL_ASSESSMENT: 0-10

## 2025-05-16 NOTE — PROGRESS NOTES
"Physical Therapy    Physical Therapy Treatment    Patient Name: Юлия Ku  MRN: [id number]  : 1955  Today's Date: 2025  Time Calculation  Start Time: 1300  Stop Time: 1354  Time Calculation (min): 54 min    PT Therapeutic Procedures Time Entry  Therapeutic Exercise Time Entry: 8  Therapeutic Activity Time Entry: 46        VISIT:# 15    Current Problem  Problem List Items Addressed This Visit           ICD-10-CM    Impairment of balance R26.89    Shuffling gait R26.89    Parkinson's disease without dyskinesia, with fluctuating manifestations G20.A2    Bilateral leg weakness R29.898        Subjective    Pt's  reports seeing the neurologist yesterday and she suggested trying Kynmobi along with the carbidopa-levodopa to reduce her Parkinson's symptoms. States she is back on her regular medication routine now and feeling back to baseline. No falls since last visit reported. Pt denies any pain.     Pain  Pain Assessment: 0-10  0-10 (Numeric) Pain Score: 0 - No pain     Objective    Amb 735ft w/FWW CG-min A outside on sidewalk with level and slight inclines & declines      Repetitive simulated transfers from chair to her \"toilet\" x6 with CG-SBA and max VC's for proper turning      Precautions  Precautions  STEADI Fall Risk Score (The score of 4 or more indicates an increased risk of falling): unchanged  Precautions Comment: fall risk    Treatments:  Date 2025   Visit # #12 #13 #14 #15   REPS               Nustep 10' @L3             // bars amb:  Fwd  Lat  Retro  Tandem  Lat over cones    0 Ue's 3 laps   3 laps 0 Ue's  2 laps 1 UE           Step-taps       Foam stance       Gastroc stretch       HS stretch               Shuttle: DLP  SLP 4B 20 x 2   3B 10 x 2  4B 20x2  3B 2x15 ea      Big Gait training 85' x 1  100' x 1 With and w/o FWW in clinic With FWW in clinic and hallway up/down ramp 735ft CG-min A w/FWW outside on sidewalk   Turns x X in // bars and in gym using " FWW Multiple in the gym    Sit-stand   10x low chair w/pillow    Transfers  9x throughout clinic varying seat heights  Simulated walking to toilet at home   Stairs       Jain       DGI       5x Sit to Stand       TUG               // bars: LE ex  HR  Marching  Hip abd  Hip ext  HS curls     10 x 2 2#  10 x 2 2#  10 x 2 2#  10 x 2 2#                     Car transfer                       HEP                       Assessment:   Pt tolerated walking outside on the sidewalk with FWW with majority CGA, but intermittent min A needed due to freezing episodes with turns and increased freezing noted with increasing fatigue. She needed increased VC's to stop, pull back the FWW and then initiate amb again with big steps. Sig foot scuffing noted Talib today that worsened with fatigue. Pt needed cues for safe and big turning toward her simulated home toilet during transfers and she improved with increased repetitions. Educated pt and  that stomping her feet when turning helps to increase her movement amplitude.       Plan:    Progress stability with turning and transfers toward discharge    Goals:  Active       Parkinson's / Long COVID       1) Independent standing home exercise program with 90% teach back capability by the patient to show progression (Progressing)       Start:  03/28/25    Expected End:  04/25/25            2) Improve static balance Jain score >6 points to reduce her fall risk with standing ADL's (Progressing)       Start:  03/28/25    Expected End:  05/23/25            3) Improve MMT of Talib LE's to >/=4+/5 to improve joint stability with sit-stand transfers w/o UE WB or external assist (Progressing)       Start:  03/28/25    Expected End:  05/23/25            4) Functional Outcome ABC score improves to >/=40% confidence to reduce her FOF (Progressing)       Start:  03/28/25    Expected End:  05/23/25            5) Pt to amb >150ft w/least restrictive device Mod Indep-Sup with >50% decreased freezing  occurrences (Progressing)       Start:  03/28/25    Expected End:  05/23/25

## 2025-05-19 ENCOUNTER — APPOINTMENT (OUTPATIENT)
Dept: INTEGRATIVE MEDICINE | Facility: CLINIC | Age: 70
End: 2025-05-19
Payer: MEDICARE

## 2025-05-19 DIAGNOSIS — E03.9 HYPOTHYROIDISM, UNSPECIFIED TYPE: Primary | ICD-10-CM

## 2025-05-19 PROCEDURE — 99213 OFFICE O/P EST LOW 20 MIN: CPT | Performed by: INTERNAL MEDICINE

## 2025-05-19 PROCEDURE — 1159F MED LIST DOCD IN RCRD: CPT | Performed by: INTERNAL MEDICINE

## 2025-05-19 PROCEDURE — G2211 COMPLEX E/M VISIT ADD ON: HCPCS | Performed by: INTERNAL MEDICINE

## 2025-05-19 PROCEDURE — 1160F RVW MEDS BY RX/DR IN RCRD: CPT | Performed by: INTERNAL MEDICINE

## 2025-05-19 PROCEDURE — 4010F ACE/ARB THERAPY RXD/TAKEN: CPT | Performed by: INTERNAL MEDICINE

## 2025-05-19 RX ORDER — LEVOTHYROXINE SODIUM 100 UG/1
100 TABLET ORAL DAILY
Qty: 30 TABLET | Refills: 2 | Status: SHIPPED | OUTPATIENT
Start: 2025-05-19 | End: 2026-05-19

## 2025-05-19 ASSESSMENT — ENCOUNTER SYMPTOMS
SLEEP DISTURBANCE: 1
ABDOMINAL DISTENTION: 0
DIARRHEA: 0
UNEXPECTED WEIGHT CHANGE: 1
DECREASED CONCENTRATION: 1
NAUSEA: 0
NECK STIFFNESS: 0
CONFUSION: 1
FATIGUE: 1
CONSTIPATION: 1
NERVOUS/ANXIOUS: 1
ACTIVITY CHANGE: 1
ABDOMINAL PAIN: 0
DIZZINESS: 1
NECK PAIN: 0

## 2025-05-19 NOTE — PATIENT INSTRUCTIONS
Start miralax 1/2 tablespoon daily in 8 ounces of juice or water or coffee. If you do not have a bowel movement daily, recommend taking 1 tablespoon daily. Can continue to use senakot.   Increase the levothyroxine to 100 mcg by mouth daily. Empty stomach.   Keep doing the bike every day and walking, ideally outdoors if possible.   Follow up six weeks.  Mi Fox MD PhD

## 2025-05-19 NOTE — PROGRESS NOTES
Integrative Medicine Follow-up Visit :     Subjective   Patient ID: Юлия Ku is a 70 y.o. female who presents for Hypothyroidism       HPI  Still very tired. She is taking the levothyroxine regularly.  Was levothyroxine 88 mcg on an empty stomach and waiting 30 minutes before eating. Tsh still high. Still feels sluggish. Still trouble with bowel movements. Taking sennakot which helps some. Still constipated.  Does not want to take senakot daily.  wants her to do this. Having a bowel movement every other day.      Walking is improving. Using a walker better. Going to physical therapy two times per week. Still getting on a stationary bike daily with her 's help.   Saw neurologist last week and was prescribed something new called APOKYN.     They are eating oatmeal again.  gives her fruit daily.   Pain:did not discuss.     Review of Systems   Constitutional:  Positive for activity change, fatigue and unexpected weight change.   HENT:  Trouble swallowing: free t4.    Gastrointestinal:  Positive for constipation. Negative for abdominal distention, abdominal pain, diarrhea and nausea.   Musculoskeletal:  Positive for gait problem. Negative for neck pain and neck stiffness.   Skin:  Negative for rash.   Neurological:  Positive for dizziness.   Psychiatric/Behavioral:  Positive for confusion, decreased concentration and sleep disturbance. Negative for suicidal ideas. The patient is nervous/anxious.                   Objective   There were no vitals taken for this visit.    Physical Exam  Constitutional:       Comments: overweight   HENT:      Head: Normocephalic and atraumatic.      Mouth/Throat:      Mouth: Mucous membranes are moist.   Eyes:      Conjunctiva/sclera: Conjunctivae normal.      Pupils: Pupils are equal, round, and reactive to light.   Cardiovascular:      Rate and Rhythm: Normal rate.   Pulmonary:      Effort: Pulmonary effort is normal. No respiratory distress.   Abdominal:       General: There is no distension.      Palpations: Abdomen is soft.      Tenderness: There is no abdominal tenderness.   Musculoskeletal:         General: No swelling or deformity.      Cervical back: Normal range of motion.   Skin:     General: Skin is dry.      Findings: No rash.   Neurological:      Mental Status: She is alert and oriented to person, place, and time.      Motor: Weakness present.      Gait: Gait abnormal.      Comments: Needs assistance to get out of the wheel chair onto the table and has extreme trouble with balance.    Psychiatric:         Mood and Affect: Mood normal.         Thought Content: Thought content normal.      Comments: Normal affect                      Assessment/Plan     Problem List Items Addressed This Visit    None  Visit Diagnoses         Codes      Hypothyroidism, unspecified type    -  Primary E03.9    Relevant Medications    levothyroxine (Synthroid, Levoxyl) 100 mcg tablet    Other Relevant Orders    Thyroid Stimulating Hormone    Thyroxine, Free    Thyroid Stimulating Hormone    Thyroxine, Free        Keep exercising.   Thyroid dose still not right. Tsh is about 23. Recommend increase dose and check again in 6 weeks. Think her high TSH is very much causing some brain fog!  Keep working on more fruits and vegetables  Take more miralax for constipation til we can optimize diet and movement and her thyroid.       Recommend Follow up in : 6 weeks via zoom.     Mi Fox MD PhD    Time Spent  Prep time on day of patient encounter: 3 minutes  Time spent directly with patient, family or caregiver: 19 minutes  Additional Time Spent on Patient Care Activities: 0 minutes  Documentation Time: 3 minutes  Other Time Spent: 0 minutes  Total: 25 minutes

## 2025-05-20 ENCOUNTER — TREATMENT (OUTPATIENT)
Dept: OCCUPATIONAL THERAPY | Facility: HOSPITAL | Age: 70
End: 2025-05-20
Payer: MEDICARE

## 2025-05-20 ENCOUNTER — TREATMENT (OUTPATIENT)
Dept: PHYSICAL THERAPY | Facility: HOSPITAL | Age: 70
End: 2025-05-20
Payer: MEDICARE

## 2025-05-20 DIAGNOSIS — R26.89 SHUFFLING GAIT: ICD-10-CM

## 2025-05-20 DIAGNOSIS — G20.A2 PARKINSON'S DISEASE WITHOUT DYSKINESIA, WITH FLUCTUATING MANIFESTATIONS: ICD-10-CM

## 2025-05-20 DIAGNOSIS — R29.898 BILATERAL LEG WEAKNESS: ICD-10-CM

## 2025-05-20 DIAGNOSIS — R27.8 ABNORMAL COORDINATION: Primary | ICD-10-CM

## 2025-05-20 DIAGNOSIS — R53.1 WEAKNESS: ICD-10-CM

## 2025-05-20 DIAGNOSIS — R26.89 IMPAIRMENT OF BALANCE: ICD-10-CM

## 2025-05-20 PROCEDURE — 97530 THERAPEUTIC ACTIVITIES: CPT | Mod: GO,CO

## 2025-05-20 PROCEDURE — 97110 THERAPEUTIC EXERCISES: CPT | Mod: GP,CQ

## 2025-05-20 ASSESSMENT — PAIN - FUNCTIONAL ASSESSMENT
PAIN_FUNCTIONAL_ASSESSMENT: 0-10
PAIN_FUNCTIONAL_ASSESSMENT: 0-10

## 2025-05-20 ASSESSMENT — PAIN SCALES - GENERAL
PAINLEVEL_OUTOF10: 0 - NO PAIN
PAINLEVEL_OUTOF10: 0 - NO PAIN

## 2025-05-20 NOTE — PROGRESS NOTES
"Physical Therapy    Physical Therapy Treatment    Patient Name: Юлия Ku  MRN: [id number]  : 1955  Today's Date: 2025  Time Calculation  Start Time: 1300  Stop Time: 1340  Time Calculation (min): 40 min    PT Therapeutic Procedures Time Entry  Therapeutic Exercise Time Entry: 40          VISIT:# 16    Current Problem  Problem List Items Addressed This Visit           ICD-10-CM    Impairment of balance R26.89    Shuffling gait R26.89    Parkinson's disease without dyskinesia, with fluctuating manifestations G20.A2    Bilateral leg weakness R29.898        Subjective    No new falls reported.       Pain  Pain Assessment: 0-10  0-10 (Numeric) Pain Score: 0 - No pain          Objective                 Precautions  Precautions  STEADI Fall Risk Score (The score of 4 or more indicates an increased risk of falling): unchanged  Precautions Comment: fall risk      Treatments:     Date 2025    Visit # #10 #11 #12 #13  #16   REPS                   Nustep 10' @L3 10' @L3 10' @L3   10' @L3            // bars amb:  Fwd  Lat  Retro  Tandem  Lat over cones  Fwd cone tap Talib UE  2 laps  2 laps  2 laps    next Talib UE    2 laps  2 laps     0 Ue's 3 laps   3 laps 0 Ue's  2 laps 1 UE   1 lap  1 lap  1 lap    2 laps  2 laps               Step-taps      8\" 5x each 2 UE  8\" 5x each 1 UE  8\" 10x each 0 UE    Foam stance         Gastroc stretch      30\" x 3    HS stretch                   Shuttle: DLP  SLP   4B 20 x 2   3B 10 x 2  4B 20x2  3B 2x15 ea      Big Gait training 200' x 2   165' x 2   Fww sba/cga 200' x 1  138'' 85' x 1  100' x 1 With and w/o FWW in clinic With FWW in clinic and hallway up/down ramp    Turns  X x X in // bars and in gym using FWW Multiple in the gym    Sit-stand     10x low chair w/pillow    Transfers    9x throughout clinic varying seat heights     Stairs  2 full flights up/down 1-2 HRA Mod A up Min A down       Jain         DGI         5x Sit to " Stand         TUG                   // bars: LE ex  HR  Marching  Hip abd  Hip ext  HS curls       10 x 2 2#  10 x 2 2#  10 x 2 2#  10 x 2 2#                         Car transfer           Turns  Max cues 75% of the time                  HEP                                Assessment:   Pt required cues to slow down with step taps to maintain her balance.  Pt froze less than 50% of the time walking today with cues.         Plan: recheck  OP PT Plan  Treatment/Interventions: Education/ Instruction, Manual therapy, Neuromuscular re-education, Therapeutic activities, Therapeutic exercises, Gait training  PT Plan: Skilled PT  PT Frequency: 2 times per week  Duration: 8 weeks  Certification Period Start Date: 03/28/25  Certification Period End Date: 05/23/25  Number of Treatments Authorized: medical necessity  Rehab Potential: Good  Plan of Care Agreement: Patient    Goals:  Active       Parkinson's / Long COVID       1) Independent standing home exercise program with 90% teach back capability by the patient to show progression (Progressing)       Start:  03/28/25    Expected End:  04/25/25            2) Improve static balance Jain score >6 points to reduce her fall risk with standing ADL's (Progressing)       Start:  03/28/25    Expected End:  05/23/25            3) Improve MMT of Talib LE's to >/=4+/5 to improve joint stability with sit-stand transfers w/o UE WB or external assist (Progressing)       Start:  03/28/25    Expected End:  05/23/25            4) Functional Outcome ABC score improves to >/=40% confidence to reduce her FOF (Progressing)       Start:  03/28/25    Expected End:  05/23/25            5) Pt to amb >150ft w/least restrictive device Mod Indep-Sup with >50% decreased freezing occurrences (Progressing)       Start:  03/28/25    Expected End:  05/23/25

## 2025-05-20 NOTE — PROGRESS NOTES
Occupational Therapy    OT Treatment    Patient Name: Юлия Ku  MRN: [id number]  Today's Date: 5/20/2025  Visit# 7  Time Calculation  Start Time: 1345  Stop Time: 1430  Time Calculation (min): 45 min             Therapeutic Codes:  OT Therapeutic Procedures Time Entry  Therapeutic Activity Time Entry: 45  Current Problem:  1. Abnormal coordination        2. Parkinson's disease without dyskinesia, with fluctuating manifestations  Follow Up In Occupational Therapy      3. Weakness          Assessment:  Pt reports hands were fatigue but not shaking today with tx session. Pt demonstrates the proper technique with tip to tip pinch to pads of fingers with FM coordination skills. Pt tends to move very quickly at times and not focusing on the reason behind each task to be performed with education received and spouse present.  OT Assessment Results: Decreased ADL status, Decreased upper extremity range of motion, Decreased upper extremity strength, Decreased IADLs    Plan:  Pt to continue with FM coordination and hand strengthening to progress ADL and IADL performances.     Subjective   Pt and spouse reports pt is attempting and writing ed at home now. Pt rports she has been doing exercises and brought Thera Putty in today to tx session.     Pain:  Pain Assessment  Pain Assessment: 0-10  0-10 (Numeric) Pain Score: 0 - No pain    Objective     5/5/2025 5/13/25 5/20/25   Exercises: Reps:                             Activities:      Pink t-putty 1x5 composite flexion, isolated pinch, digit abduction     In hand manipulation Palm to finger, finger to palm translation 1x10 B hands. Pt instructed to stack pennies. Increased difficulty.  Palm to finger, finger to palm translation 1x10 PARISH hands. Pt instructed to stack pennies/bingo markers Increased difficulty 2 drops    Ball Manipulation 1x5 minutes with CW and CCW with R hand only 6 drops (hand shaking and fatigue) Palm to finger, finger to palm translation stacking 4x10  PARISH hands. Pt instructed to stack pennies markers Increased difficulty 6 drops    Ball Manipulation 1x5 minutes with CW and CCW with R hand only 3 drops (hand shaking and fatigue)   Theraband      Hand strengthening   30# 2x10 reps with PARISH hands with shoulder at 90 degree holds T putty red/orange PARISH hands:  Grasp 1x5 reps, opposition pinch 1x5 reps and abd 1x5 reps     Hand writting work sheets: Name writing, sentences, alphabet           HEP provided on HEP provided to pt including pink t-putty. Pt instructed to complete 1x a day, 5-10 reps within pain free range. Handouts provided to pt.      Modalities:                              Manual:                              Functional review:       Completed on: 5/5/2025 OT evaluation           OP EDUCATION:  Education  Individual(s) Educated: Patient  Education Provided: Diagnosis & Precautions, Symptom management, Fall precautons, POC discussed and agreed upon, Risk and benefits of OT discussed with patient or other  Home Program: [facility], Handout issued  Risk and Benefits Discussed with Patient/Caregiver/Other: yes  Patient/Caregiver Demonstrated Understanding: yes  Plan of Care Discussed and Agreed Upon: yes  Patient Response to Education: Patient/Caregiver Verbalized Understanding of Information  Education Comment:  (Pt and spouse educated and added new T putty exercise and added to HEP.)    Goals:  long covid- occupational therapy Problems       long covid- occupational therapy Problems (Active)       OT Problem       OT-Patient will manage fatigue independently through use of fatigue traffic light and other fatigue strategies to allow participation in ADL/IADL/leisure with fatigue rating of 4/10(yellow) or less.        Start:  03/13/24    Expected End:  06/14/24            Patient will complete complex IADL independently using cognitive and fatigue management strategies        Start:  03/13/24    Expected End:  06/14/24            OT- Patient will use memory and  planning system independently as a compensatory tool for management of day, memory and planning.        Start:  03/13/24    Expected End:  06/14/24            Patient will use memory strategies independently to allow improved ability to manage appointments, remember errands,and to improve working memory.       Start:  03/13/24    Expected End:  06/14/24            OT-Patient will demonstrate good recall and understanding of written material through use of PQRST and other reading strategies.        Start:  03/13/24    Expected End:  06/14/24            Patient will complete self care with use of compensatory tools/techniques and energy conservation techniques to allow decreased fatigue..       Start:  03/13/24    Expected End:  06/14/24            Patient will demonstrate improved writing demonstrating 25% improvement in legibility and ability to mentally process writing.       Start:  03/13/24    Expected End:  06/14/24            Patient will demonstrated improved functional communication through use of compensatory tools/techniques and improved executive function.       Start:  03/13/24    Expected End:  06/14/24            Patient will participate in Leisure tasks independently including walking through use of fatigue management, energy conservation and use of compensatory tools/techniques.       Start:  03/13/24    Expected End:  06/14/24            Patient with demonstrate 4+/5 bilateral UE strength and independent HEP completion.       Start:  03/13/24                     OT EVAL 5/5/25 Problems       OT EVAL 5/5/25 Problems (Active)       OT Goals       LTG - Patient will indicate/ demonstrate the ability to resume all preinjury ADLs and IADLs without significant limits secondary to decreased ROM, decreased strength and/or pain as indicated by Quickdash score of less than 25%.        Start:  05/05/25    Expected End:  07/28/25            Develop and issue HEP to help maximize ROM, strength and tolerance to  help maximize return to all pre-onset activities.        Start:  05/05/25    Expected End:  07/28/25            Pt will demonstrate increased  strength as appropriate with the B  to increase by 2-5# to help patient resume ADLs and IADLs.'       Start:  05/05/25    Expected End:  07/28/25            Pt will demonstrate increased FMC in R hand as indicated by increased 9HPT score 2-5 seconds as compared to L hand.        Start:  05/05/25    Expected End:  07/28/25            Pt will demonstrate increased strength in BUEs to 4+/5 throughout as needed to complete ADL tasks with increased independence.        Start:  05/05/25    Expected End:  07/28/25

## 2025-05-21 LAB
BASOPHILS # BLD AUTO: 48 CELLS/UL (ref 0–200)
BASOPHILS NFR BLD AUTO: 0.7 %
EOSINOPHIL # BLD AUTO: 88 CELLS/UL (ref 15–500)
EOSINOPHIL NFR BLD AUTO: 1.3 %
ERYTHROCYTE [DISTWIDTH] IN BLOOD BY AUTOMATED COUNT: 12.7 % (ref 11–15)
FOLATE SERPL-MCNC: 16 NG/ML
HCT VFR BLD AUTO: 43.8 % (ref 35–45)
HGB BLD-MCNC: 14.5 G/DL (ref 11.7–15.5)
LYMPHOCYTES # BLD AUTO: 2162 CELLS/UL (ref 850–3900)
LYMPHOCYTES NFR BLD AUTO: 31.8 %
MCH RBC QN AUTO: 35.5 PG (ref 27–33)
MCHC RBC AUTO-ENTMCNC: 33.1 G/DL (ref 32–36)
MCV RBC AUTO: 107.1 FL (ref 80–100)
MONOCYTES # BLD AUTO: 530 CELLS/UL (ref 200–950)
MONOCYTES NFR BLD AUTO: 7.8 %
NEUTROPHILS # BLD AUTO: 3971 CELLS/UL (ref 1500–7800)
NEUTROPHILS NFR BLD AUTO: 58.4 %
PLATELET # BLD AUTO: 303 THOUSAND/UL (ref 140–400)
PMV BLD REES-ECKER: 9.5 FL (ref 7.5–12.5)
RBC # BLD AUTO: 4.09 MILLION/UL (ref 3.8–5.1)
TSH SERPL-ACNC: 46.26 MIU/L (ref 0.4–4.5)
VIT B12 SERPL-MCNC: 910 PG/ML (ref 200–1100)
WBC # BLD AUTO: 6.8 THOUSAND/UL (ref 3.8–10.8)

## 2025-05-23 ENCOUNTER — TREATMENT (OUTPATIENT)
Dept: PHYSICAL THERAPY | Facility: HOSPITAL | Age: 70
End: 2025-05-23
Payer: MEDICARE

## 2025-05-23 DIAGNOSIS — R29.898 BILATERAL LEG WEAKNESS: ICD-10-CM

## 2025-05-23 DIAGNOSIS — R26.89 IMPAIRMENT OF BALANCE: ICD-10-CM

## 2025-05-23 DIAGNOSIS — R26.89 SHUFFLING GAIT: ICD-10-CM

## 2025-05-23 DIAGNOSIS — G20.A2 PARKINSON'S DISEASE WITHOUT DYSKINESIA, WITH FLUCTUATING MANIFESTATIONS: ICD-10-CM

## 2025-05-23 PROCEDURE — 97112 NEUROMUSCULAR REEDUCATION: CPT | Mod: GP | Performed by: PHYSICAL THERAPIST

## 2025-05-23 PROCEDURE — 97110 THERAPEUTIC EXERCISES: CPT | Mod: GP | Performed by: PHYSICAL THERAPIST

## 2025-05-23 ASSESSMENT — BALANCE ASSESSMENTS
STANDING UNSUPPORTED WITH FEET TOGETHER: ABLE TO PLACE FEET TOGETHER INDEPENDENTLY AND STAND 1 MINUTE SAFELY
STANDING UNSUPPORTED: ABLE TO STAND SAFELY FOR 2 MINUTES
STANDING TO SITTING: ABLE TO STAND WITHOUT USING HANDS AND STABILIZE INDEPENDENTLY
LONG VERSION TOTAL SCORE (MAX 56): 45
STANDING UNSUPPORTED ONE FOOT IN FRONT: ABLE TO PLACE FOOT AHEAD INDEPENDENTLY AND HOLD 30 SECONDS
PLACE ALTERNATE FOOT ON STEP OR STOOL WHILE STANDING UNSUPPORTED: ABLE TO COMPLETE 4 STEPS WITHOUT AID WITH SUPERVISION
TURN 360 DEGREES: NEEDS CLOSE SUPERVISION OR VERBAL CUEING
SITTING WITH BACK UNSUPPORTED BUT FEET SUPPORTED ON FLOOR OR ON A STOOL: ABLE TO SIT SAFELY AND SECURELY FOR 2 MINUTES
STANDING UNSUPPORTED WITH EYES CLOSED: ABLE TO STAND 10 SECONDS SAFELY
STANDING TO SITTING: CONTROLS DESCENT BY USING HANDS
TRANSFERS: ABLE TO TRANSFER WITH VERBAL CUEING AND/OR SUPERVISION
PLACE ALTERNATE FOOT ON STEP OR STOOL WHILE STANDING UNSUPPORTED: LOOKS BEHIND FROM BOTH SIDES AND WEIGHT SHIFTS WELL
STANDING ON ONE LEG: ABLE TO LIFT LEG INDEPENDENTLY AND HOLD GREATER THAN OR EQUAL TO 3 SECONDS
REACHING FORWARD WITH OUTSTRETCHED ARM WHILE STANDING: CAN REACH FORWARD CONFIDENTLY 25 CM (10 INCHES)
PICK UP OBJECT FROM THE FLOOR FROM A STANDING POSITION: ABLE TO PICK UP SLIPPER SAFELY AND EASILY

## 2025-05-23 ASSESSMENT — PAIN SCALES - GENERAL: PAINLEVEL_OUTOF10: 0 - NO PAIN

## 2025-05-23 ASSESSMENT — PAIN - FUNCTIONAL ASSESSMENT: PAIN_FUNCTIONAL_ASSESSMENT: 0-10

## 2025-05-23 NOTE — PROGRESS NOTES
Physical Therapy    Physical Therapy Reassessment / Discharge Summary    Patient Name: Юлия Ku  MRN: [id number]  : 1955  Today's Date: 2025  Time Calculation  Start Time: 1300  Stop Time: 1400  Time Calculation (min): 60 min    PT Therapeutic Procedures Time Entry  Neuromuscular Re-Education Time Entry: 50  Therapeutic Exercise Time Entry: 10        VISIT:# 17    Current Problem  Problem List Items Addressed This Visit           ICD-10-CM    Impairment of balance R26.89    Shuffling gait R26.89    Parkinson's disease without dyskinesia, with fluctuating manifestations G20.A2    Bilateral leg weakness R29.898        Subjective    Pt and  present with report of her feeling off the last 2 days due to her recent IGG infusion, stating it makes her more tired and feel off mentally.  states her turns have been worse lately and she froze walking into the building today.     Pain  Pain Assessment: 0-10  0-10 (Numeric) Pain Score: 0 - No pain       Objective   LE Strength:   Measured with seated MMT'ing, grossly WNL's with exception of those listed below    Right Left   Hip:       Flexion 4,4+ 4+   Abduction 4,4+ 4,4+   Adduction 5 5           Knee:       Extension 5 5   Flexion 4+,5 5           Ankle:       DF 4+,5 5   PF  5  5     Outcome Measures:  Other Measures  5x Sit to Stand: 12.60 seconds  Activities - Specific Balance Confidence Scale: 24.06     Jain Balance Scale  1. Sitting to Standing: Able to stand without using hands and stabilize independently  2. Standing Unsupported: Able to stand safely for 2 minutes  3. Sitting with Back Unsupported but Feet Supported on Floor or on a Stool: Able to sit safely and securely for 2 minutes  4. Standing to Sitting: Controls descent by using hands  5.  Transfers: Able to transfer with verbal cueing and/or supervision  6. Standing Unsupported with Eyes Closed: Able to stand 10 seconds safely  7. Standing Unsupported with Feet Together: Able to  "place feet together independently and stand 1 minute safely  8. Reach Forward with Outstretched Arm While Standing: Can reach forward confidently 25 cm (10 inches)  9.  Object from Floor from a Standing Position: Able to  slipper safely and easily  10. Turning to Look Behind Over Left and Right Shoulders While Standing: Looks behind from both sides and weight shifts well  11. Turn 360 Degrees: Needs close supervision or verbal cueing  12. Place Alternate Foot on Step or Stool While Standing Unsupported: Able to complete 4 steps without aid with supervision  13. Standing Unsupported One Foot in Front: Able to place foot ahead independently and hold 30 seconds  14. Standing on One Leg: Able to lift leg independently and hold greater than or equal to 3 seconds  Jain Balance Score: 45       Precautions  Precautions  STEADI Fall Risk Score (The score of 4 or more indicates an increased risk of falling): unchanged  Precautions Comment: fall risk    Treatments:  Date 5/9/25 5/13/25 5/20/2025 5/23/25   Visit # #13  #16 #17   REPS               Nustep   10' @L3           // bars amb:  Fwd  Lat  Retro  Tandem  Lat over cones  Fwd cone tap   0 Ue's 3 laps   3 laps 0 Ue's  2 laps 1 UE   1 lap  1 lap  1 lap    2 laps  2 laps              Step-taps   8\" 5x each 2 UE  8\" 5x each 1 UE  8\" 10x each 0 UE     Foam stance       Gastroc stretch   30\" x 3     HS stretch               Shuttle: DLP  SLP 4B 20x2  3B 2x15 ea       Big Gait training With and w/o FWW in clinic With FWW in clinic and hallway up/down ramp  With FWW in clinic and hallway up/down ramp   Turns X in // bars and in gym using FWW Multiple in the gym  Multiple in gym w/max VC's   Sit-stand  10x low chair w/pillow  5x2   Transfers 9x throughout clinic varying seat heights   Simulated toilet transfers   Stairs       Jain    45/56   DGI       5x Sit to Stand    1st 17.56 seconds (6x STS w/1 LOB)  2nd 12.60 seconds   TUG               // bars: LE " ex  HR  Marching  Hip abd  Hip ext  HS curls                      Car transfer                       HEP     Reviewed seated and standing HEPs     Assessment:   Pt and  were educated on the importance of continuing to practice her walking with larger steps, as well as big stepping during turns to break freezing episodes and reduce fall risk. Emphasized the need to use the FWW, as her amb w/o any device sig increases her risk of falling. Pt's static balance assessment sig improved since beginning therapy and her 5x STS test improved as well. Pt and  verbalized understanding of the importance of continuing to increase her overall activity level to prevent regression from gains made in PT. Pt is discharged to [facility] at this time as max benefit of skilled PT has been reached.     Plan:   Pt discharged to [facility]    Goals:  Resolved       Parkinson's / Long COVID       1) Independent standing home exercise program with 90% teach back capability by the patient to show progression (Adequate for Discharge)       Start:  03/28/25    Expected End:  04/25/25            2) Improve static balance Jain score >6 points to reduce her fall risk with standing ADL's (Met)       Start:  03/28/25    Expected End:  05/23/25    Resolved:  05/23/25         3) Improve MMT of Talib LE's to >/=4+/5 to improve joint stability with sit-stand transfers w/o UE WB or external assist (Met)       Start:  03/28/25    Expected End:  05/23/25    Resolved:  05/23/25         4) Functional Outcome ABC score improves to >/=40% confidence to reduce her FOF (Not met)       Start:  03/28/25    Expected End:  05/23/25    Resolved:  05/23/25         5) Pt to amb >150ft w/least restrictive device Mod Indep-Sup with >50% decreased freezing occurrences (Adequate for Discharge)       Start:  03/28/25    Expected End:  05/23/25

## 2025-05-29 ENCOUNTER — TELEPHONE (OUTPATIENT)
Dept: PRIMARY CARE | Facility: CLINIC | Age: 70
End: 2025-05-29

## 2025-05-29 ENCOUNTER — TREATMENT (OUTPATIENT)
Dept: OCCUPATIONAL THERAPY | Facility: HOSPITAL | Age: 70
End: 2025-05-29
Payer: MEDICARE

## 2025-05-29 DIAGNOSIS — R53.1 WEAKNESS: ICD-10-CM

## 2025-05-29 DIAGNOSIS — G20.A2 PARKINSON'S DISEASE WITHOUT DYSKINESIA, WITH FLUCTUATING MANIFESTATIONS: ICD-10-CM

## 2025-05-29 DIAGNOSIS — R27.8 ABNORMAL COORDINATION: Primary | ICD-10-CM

## 2025-05-29 PROCEDURE — 97110 THERAPEUTIC EXERCISES: CPT | Mod: GO,CO

## 2025-05-29 PROCEDURE — 97530 THERAPEUTIC ACTIVITIES: CPT | Mod: GO,CO

## 2025-05-29 ASSESSMENT — PAIN SCALES - GENERAL: PAINLEVEL_OUTOF10: 0 - NO PAIN

## 2025-05-29 ASSESSMENT — PAIN - FUNCTIONAL ASSESSMENT: PAIN_FUNCTIONAL_ASSESSMENT: 0-10

## 2025-05-29 NOTE — PROGRESS NOTES
Occupational Therapy    OT Treatment    Patient Name: Юлия Ku  MRN: [id number]  Today's Date: 5/29/2025  Visit# 8  Time Calculation  Start Time: 1300  Stop Time: 1345  Time Calculation (min): 45 min             Therapeutic Codes:  OT Therapeutic Procedures Time Entry  Therapeutic Activity Time Entry: 30  Therapeutic Exercise Time Entry: 15  Current Problem:  1. Abnormal coordination        2. Parkinson's disease without dyskinesia, with fluctuating manifestations  Follow Up In Occupational Therapy      3. Weakness          Assessment:  Pt needs constant verbal cues to perform HEP correctly. Pt needs cues to perform each exercise correctly like big powerful movements, instead of short and quick movements with wrist roll up for wrist strengthening exercises. Pt reports no change in pain leaving OT tx session.   OT Assessment Results: Decreased ADL status, Decreased upper extremity range of motion, Decreased upper extremity strength, Decreased IADLs    Plan:  Pt to have recheck performed next tx session.     Subjective   Pt reports and spouse that patient is not keeping up with HEP and to tired to perform them constantly. Pt did perform some HEP stretches yesterday, but hasn't been doing them all constantly at home. Pts spouse reports its hard to get patient to do anything at home. Pt seen Neurologist and started new medication that is making her more fatigued per patient and spouse.     Pain:  Pain Assessment  Pain Assessment: 0-10  0-10 (Numeric) Pain Score: 0 - No pain    Objective     5/5/2025 5/13/25 5/20/25 5/29/25   Exercises: Reps:                               Wrist roll ups 1#DMB flexion and ext 1x10 reps    Activities:       Pink t-putty 1x5 composite flexion, isolated pinch, digit abduction      In hand manipulation Palm to finger, finger to palm translation 1x10 B hands. Pt instructed to stack pennies. Increased difficulty.  Palm to finger, finger to palm translation 1x10 PARISH hands. Pt instructed to  stack pennies/bingo markers Increased difficulty 2 drops    Ball Manipulation 1x5 minutes with CW and CCW with R hand only 6 drops (hand shaking and fatigue) Palm to finger, finger to palm translation stacking 4x10 PARISH hands. Pt instructed to stack pennies markers Increased difficulty 6 drops    Ball Manipulation 1x5 minutes with CW and CCW with R hand only 3 drops (hand shaking and fatigue)    Theraband       Hand strengthening   30# 2x10 reps with PARISH hands with shoulder at 90 degree holds T putty red/orange PARISH hands:  Grasp 1x5 reps, opposition pinch 1x5 reps and abd 1x5 reps T putty red/orange PARISH hands:  Grasp 1x5 reps, opposition pinch 1x5 reps and abd 1x5 reps     Hand writting work sheets: Name writing, sentences, alphabet   [place] sitting perform panel #3 3 shapes on    Standing with fww and SBA with 6 screws on and no drops          HEP provided on HEP provided to pt including pink t-putty. Pt instructed to complete 1x a day, 5-10 reps within pain free range. Handouts provided to pt.       Modalities:                                   Manual:                                   Functional review:        Completed on: 5/5/2025 OT evaluation            OP EDUCATION:  Education  Individual(s) Educated: Patient  Education Provided: Diagnosis & Precautions, Symptom management, Fall precautons, POC discussed and agreed upon, Risk and benefits of OT discussed with patient or other  Home Program: [facility], Handout issued  Risk and Benefits Discussed with Patient/Caregiver/Other: yes  Patient/Caregiver Demonstrated Understanding: yes  Plan of Care Discussed and Agreed Upon: yes  Patient Response to Education: Patient/Caregiver Verbalized Understanding of Information    Goals:  long covid- occupational therapy Problems       long covid- occupational therapy Problems (Active)       OT Problem       OT-Patient will manage fatigue independently through use of fatigue traffic light and other fatigue strategies to allow  participation in ADL/IADL/leisure with fatigue rating of 4/10(yellow) or less.        Start:  03/13/24    Expected End:  06/14/24            Patient will complete complex IADL independently using cognitive and fatigue management strategies        Start:  03/13/24    Expected End:  06/14/24            OT- Patient will use memory and planning system independently as a compensatory tool for management of day, memory and planning.        Start:  03/13/24    Expected End:  06/14/24            Patient will use memory strategies independently to allow improved ability to manage appointments, remember errands,and to improve working memory.       Start:  03/13/24    Expected End:  06/14/24            OT-Patient will demonstrate good recall and understanding of written material through use of PQRST and other reading strategies.        Start:  03/13/24    Expected End:  06/14/24            Patient will complete self care with use of compensatory tools/techniques and energy conservation techniques to allow decreased fatigue..       Start:  03/13/24    Expected End:  06/14/24            Patient will demonstrate improved writing demonstrating 25% improvement in legibility and ability to mentally process writing.       Start:  03/13/24    Expected End:  06/14/24            Patient will demonstrated improved functional communication through use of compensatory tools/techniques and improved executive function.       Start:  03/13/24    Expected End:  06/14/24            Patient will participate in Leisure tasks independently including walking through use of fatigue management, energy conservation and use of compensatory tools/techniques.       Start:  03/13/24    Expected End:  06/14/24            Patient with demonstrate 4+/5 bilateral UE strength and independent HEP completion.       Start:  03/13/24                     OT EVAL 5/5/25 Problems       OT EVAL 5/5/25 Problems (Active)       OT Goals       LTG - Patient will indicate/  demonstrate the ability to resume all preinjury ADLs and IADLs without significant limits secondary to decreased ROM, decreased strength and/or pain as indicated by Quickdash score of less than 25%.        Start:  05/05/25    Expected End:  07/28/25            Develop and issue HEP to help maximize ROM, strength and tolerance to help maximize return to all pre-onset activities.        Start:  05/05/25    Expected End:  07/28/25            Pt will demonstrate increased  strength as appropriate with the B  to increase by 2-5# to help patient resume ADLs and IADLs.'       Start:  05/05/25    Expected End:  07/28/25            Pt will demonstrate increased FMC in R hand as indicated by increased 9HPT score 2-5 seconds as compared to L hand.        Start:  05/05/25    Expected End:  07/28/25            Pt will demonstrate increased strength in BUEs to 4+/5 throughout as needed to complete ADL tasks with increased independence.        Start:  05/05/25    Expected End:  07/28/25

## 2025-05-29 NOTE — TELEPHONE ENCOUNTER
Pt's  came in with patient's Tresiba Flextouch pen to give more info.(Per previous staff message). The pen states its 100 units/ML and I confirmed with the  that in the past she would take 10 units daily at bed time but recently she started taking it as needed during BS spikes. Unsure if there was any other information needed for this refill request. Please advise

## 2025-06-02 ENCOUNTER — TREATMENT (OUTPATIENT)
Dept: OCCUPATIONAL THERAPY | Facility: HOSPITAL | Age: 70
End: 2025-06-02
Payer: MEDICARE

## 2025-06-02 DIAGNOSIS — R27.8 ABNORMAL COORDINATION: Primary | ICD-10-CM

## 2025-06-02 DIAGNOSIS — R53.1 WEAKNESS: ICD-10-CM

## 2025-06-02 DIAGNOSIS — G20.A2 PARKINSON'S DISEASE WITHOUT DYSKINESIA, WITH FLUCTUATING MANIFESTATIONS: ICD-10-CM

## 2025-06-02 PROCEDURE — 97530 THERAPEUTIC ACTIVITIES: CPT | Mod: GO | Performed by: OCCUPATIONAL THERAPIST

## 2025-06-02 PROCEDURE — 97110 THERAPEUTIC EXERCISES: CPT | Mod: GO | Performed by: OCCUPATIONAL THERAPIST

## 2025-06-02 ASSESSMENT — PAIN - FUNCTIONAL ASSESSMENT: PAIN_FUNCTIONAL_ASSESSMENT: 0-10

## 2025-06-02 ASSESSMENT — PAIN SCALES - GENERAL: PAINLEVEL_OUTOF10: 0 - NO PAIN

## 2025-06-02 NOTE — TELEPHONE ENCOUNTER
Please call  and let him know that Tresiba should not be used as an as needed medication. This type of insulin is slow-acting and lasts for 42 hours in the body. The decision to restart Tresiba should be based on her morning blood sugar readings. Please let me know what her morning sugars have been recently.

## 2025-06-03 ENCOUNTER — APPOINTMENT (OUTPATIENT)
Dept: INTEGRATIVE MEDICINE | Facility: CLINIC | Age: 70
End: 2025-06-03
Payer: MEDICARE

## 2025-06-03 DIAGNOSIS — E03.9 HYPOTHYROIDISM, UNSPECIFIED TYPE: Primary | ICD-10-CM

## 2025-06-03 PROCEDURE — G2211 COMPLEX E/M VISIT ADD ON: HCPCS | Performed by: INTERNAL MEDICINE

## 2025-06-03 PROCEDURE — 4010F ACE/ARB THERAPY RXD/TAKEN: CPT | Performed by: INTERNAL MEDICINE

## 2025-06-03 PROCEDURE — 99213 OFFICE O/P EST LOW 20 MIN: CPT | Performed by: INTERNAL MEDICINE

## 2025-06-03 RX ORDER — LEVOTHYROXINE SODIUM 125 UG/1
125 TABLET ORAL DAILY
Qty: 30 TABLET | Refills: 2 | Status: SHIPPED | OUTPATIENT
Start: 2025-06-03 | End: 2026-06-03

## 2025-06-03 ASSESSMENT — ENCOUNTER SYMPTOMS
ABDOMINAL DISTENTION: 0
ACTIVITY CHANGE: 1
ABDOMINAL PAIN: 0
DIZZINESS: 1
NECK STIFFNESS: 0
DECREASED CONCENTRATION: 1
CONFUSION: 1
NAUSEA: 0
CONSTIPATION: 1
FATIGUE: 1
DIARRHEA: 0
NECK PAIN: 0
SLEEP DISTURBANCE: 1
UNEXPECTED WEIGHT CHANGE: 1
NERVOUS/ANXIOUS: 1

## 2025-06-03 NOTE — PROGRESS NOTES
Integrative Medicine Follow-up Visit :     Subjective   Patient ID: Юлия Ku is a 70 y.o. female who presents for No chief complaint on file.       HPI  Patient is not sure why she has an appointment today.   She had her tsh repeated again for some reason one week later and the tsh worsened. She is taking levothyroxine 100 mcg by mouth daily in the morning.      They are working with her pcp regarding her glucose levels.  has not been giving any insulin. She uses a echo cgm to measure her glucose levels. Apparently they were not told to give any insulin. (?).   Right now her glucose is 145. She is fasting. (It is 8:58 am.)  Had to disconitnue the amantadine because pt did not feel well.   She and her  continue to work on her getting movement daily. They are taking walks regularly. She continues PT and tries to do the exercises at home.     Review of Systems   Constitutional:  Positive for activity change, fatigue and unexpected weight change.   HENT:  Trouble swallowing: free t4.    Gastrointestinal:  Positive for constipation. Negative for abdominal distention, abdominal pain, diarrhea and nausea.   Musculoskeletal:  Positive for gait problem. Negative for neck pain and neck stiffness.   Skin:  Negative for rash.   Neurological:  Positive for dizziness.   Psychiatric/Behavioral:  Positive for confusion, decreased concentration and sleep disturbance. Negative for suicidal ideas. The patient is nervous/anxious.                   Objective   There were no vitals taken for this visit.    Physical Exam  Constitutional:       Comments: overweight   HENT:      Head: Normocephalic and atraumatic.      Mouth/Throat:      Mouth: Mucous membranes are moist.   Eyes:      Conjunctiva/sclera: Conjunctivae normal.      Pupils: Pupils are equal, round, and reactive to light.   Cardiovascular:      Rate and Rhythm: Normal rate.   Pulmonary:      Effort: Pulmonary effort is normal. No respiratory distress.    Abdominal:      General: There is no distension.      Palpations: Abdomen is soft.      Tenderness: There is no abdominal tenderness.   Musculoskeletal:         General: No swelling or deformity.      Cervical back: Normal range of motion.   Skin:     General: Skin is dry.      Findings: No rash.   Neurological:      Mental Status: She is alert and oriented to person, place, and time. Mental status is at baseline.      Motor: Weakness present.      Gait: Gait abnormal.      Comments: Needs assistance to get out of the wheel chair onto the table and has extreme trouble with balance.    Psychiatric:         Mood and Affect: Mood normal.         Thought Content: Thought content normal.      Comments: Normal affect                      Assessment/Plan     Problem List Items Addressed This Visit    None  Visit Diagnoses         Codes      Hypothyroidism, unspecified type    -  Primary E03.9    Relevant Medications    levothyroxine (Synthroid, Levoxyl) 125 mcg tablet    Other Relevant Orders    Thyroid Stimulating Hormone    Thyroxine, Free        Concern that they may not be taking the levothyroxine daily but  says that she is and that she waits at least 30 minutes before eating anything.   Increase dose of thyroid medication. Have them repeat level prior to new endocrine appointment.   Defer management of her diabetes to primary care.  could not tell me the readings on her echo.     Follow up August 2025.   Note to pcp.     Recommend Follow up in : 6 weeks.     Mi Fox MD PhD    Time Spent  Prep time on day of patient encounter: 3 minutes  Time spent directly with patient, family or caregiver: 19 minutes  Additional Time Spent on Patient Care Activities: 0 minutes  Documentation Time: 3 minutes  Other Time Spent: 0 minutes  Total: 25 minutes

## 2025-06-03 NOTE — PATIENT INSTRUCTIONS
Stop levothyroxine 100 mcg. Start the levothyroxine 125 mcg which I prescribed today. Take one pill every morning on an empty stomach and wait 30 minutes before eating.   Recheck the TSH, free t4 close to the appointment with new endocrine around July 25, 2025  Follow up in early August.   Keep exercising.   Discuss dosing of her diabetes meds with Dr. Dupree and or endocrine in July 2025    Mi Fox MD PhD

## 2025-06-03 NOTE — Clinical Note
They have new patient appointment with endocrine in July. I am trying to help them with her thyroid meds until then.  They are not giving the insulin that you have prescribed and could not tell me the fasting glucoses so I could not help them with this. I suggested bringing the meter in to see you to determine what to do. ( I saw them over video).  Let me know if I can help further. I think her thyroid issue is really worsening many of her other issues.  Mi Fox MD PhD

## 2025-06-09 ENCOUNTER — APPOINTMENT (OUTPATIENT)
Dept: INTEGRATIVE MEDICINE | Facility: CLINIC | Age: 70
End: 2025-06-09
Payer: MEDICARE

## 2025-06-13 ENCOUNTER — APPOINTMENT (OUTPATIENT)
Dept: PRIMARY CARE | Facility: CLINIC | Age: 70
End: 2025-06-13
Payer: MEDICARE

## 2025-06-13 VITALS
HEART RATE: 85 BPM | DIASTOLIC BLOOD PRESSURE: 70 MMHG | SYSTOLIC BLOOD PRESSURE: 118 MMHG | TEMPERATURE: 97.6 F | OXYGEN SATURATION: 97 %

## 2025-06-13 DIAGNOSIS — E11.9 TYPE 2 DIABETES MELLITUS WITHOUT COMPLICATION, WITH LONG-TERM CURRENT USE OF INSULIN: Chronic | ICD-10-CM

## 2025-06-13 DIAGNOSIS — Z91.148 NONCOMPLIANCE WITH MEDICATION REGIMEN: ICD-10-CM

## 2025-06-13 DIAGNOSIS — R79.89 INCREASED AMMONIA LEVEL: ICD-10-CM

## 2025-06-13 DIAGNOSIS — R41.89 COGNITIVE CHANGES: ICD-10-CM

## 2025-06-13 DIAGNOSIS — Z00.00 ROUTINE GENERAL MEDICAL EXAMINATION AT HEALTH CARE FACILITY: Primary | ICD-10-CM

## 2025-06-13 DIAGNOSIS — R42 DIZZINESS: ICD-10-CM

## 2025-06-13 DIAGNOSIS — I10 BENIGN ESSENTIAL HYPERTENSION: ICD-10-CM

## 2025-06-13 DIAGNOSIS — M54.12 CERVICAL MYELOPATHY WITH CERVICAL RADICULOPATHY: ICD-10-CM

## 2025-06-13 DIAGNOSIS — E89.0 POSTABLATIVE HYPOTHYROIDISM: Chronic | ICD-10-CM

## 2025-06-13 DIAGNOSIS — I15.2 HYPERTENSION ASSOCIATED WITH DIABETES: ICD-10-CM

## 2025-06-13 DIAGNOSIS — Z79.4 TYPE 2 DIABETES MELLITUS WITHOUT COMPLICATION, WITH LONG-TERM CURRENT USE OF INSULIN: Chronic | ICD-10-CM

## 2025-06-13 DIAGNOSIS — E78.5 HYPERLIPIDEMIA, UNSPECIFIED HYPERLIPIDEMIA TYPE: Chronic | ICD-10-CM

## 2025-06-13 DIAGNOSIS — R41.89 BRAIN FOG: ICD-10-CM

## 2025-06-13 DIAGNOSIS — Z86.39 HISTORY OF GRAVES' DISEASE: ICD-10-CM

## 2025-06-13 DIAGNOSIS — D83.9 CVID (COMMON VARIABLE IMMUNODEFICIENCY): ICD-10-CM

## 2025-06-13 DIAGNOSIS — G95.9 CERVICAL MYELOPATHY WITH CERVICAL RADICULOPATHY: ICD-10-CM

## 2025-06-13 DIAGNOSIS — E11.59 HYPERTENSION ASSOCIATED WITH DIABETES: ICD-10-CM

## 2025-06-13 PROCEDURE — 99215 OFFICE O/P EST HI 40 MIN: CPT | Performed by: FAMILY MEDICINE

## 2025-06-13 PROCEDURE — G0439 PPPS, SUBSEQ VISIT: HCPCS | Performed by: FAMILY MEDICINE

## 2025-06-13 PROCEDURE — 1160F RVW MEDS BY RX/DR IN RCRD: CPT | Performed by: FAMILY MEDICINE

## 2025-06-13 PROCEDURE — 3078F DIAST BP <80 MM HG: CPT | Performed by: FAMILY MEDICINE

## 2025-06-13 PROCEDURE — 3074F SYST BP LT 130 MM HG: CPT | Performed by: FAMILY MEDICINE

## 2025-06-13 PROCEDURE — 1036F TOBACCO NON-USER: CPT | Performed by: FAMILY MEDICINE

## 2025-06-13 PROCEDURE — 1170F FXNL STATUS ASSESSED: CPT | Performed by: FAMILY MEDICINE

## 2025-06-13 PROCEDURE — 1159F MED LIST DOCD IN RCRD: CPT | Performed by: FAMILY MEDICINE

## 2025-06-13 PROCEDURE — 4010F ACE/ARB THERAPY RXD/TAKEN: CPT | Performed by: FAMILY MEDICINE

## 2025-06-13 RX ORDER — LOSARTAN POTASSIUM 50 MG/1
50 TABLET ORAL DAILY
Qty: 90 TABLET | Refills: 1 | Status: CANCELLED | OUTPATIENT
Start: 2025-06-13

## 2025-06-13 RX ORDER — METFORMIN HYDROCHLORIDE 500 MG/1
TABLET, EXTENDED RELEASE ORAL
Start: 2025-06-13

## 2025-06-13 ASSESSMENT — ACTIVITIES OF DAILY LIVING (ADL)
BATHING: DEPENDENT
GROCERY_SHOPPING: NEEDS ASSISTANCE
DOING_HOUSEWORK: NEEDS ASSISTANCE
MANAGING_FINANCES: NEEDS ASSISTANCE
DRESSING: DEPENDENT
TAKING_MEDICATION: NEEDS ASSISTANCE

## 2025-06-13 ASSESSMENT — ENCOUNTER SYMPTOMS
CONFUSION: 1
NERVOUS/ANXIOUS: 0
DYSPHORIC MOOD: 0
FEVER: 0
CHILLS: 0
SLEEP DISTURBANCE: 0

## 2025-06-13 NOTE — PROGRESS NOTES
Subjective   Reason for Visit: Юлия Ku is an 70 y.o. female here for a Medicare Wellness visit.          Reviewed all medications by prescribing practitioner or clinical pharmacist (such as prescriptions, OTCs, herbal therapies and supplements) and documented in the medical record.    Юлия continues to have significant brain fog and disruption of her ability to ambulate.  Reports that when she first wakes up in the morning and is lying in bed, she does not experience any brain fog at all.  As soon as she stands up, she begins to feel the brain fog.  Brain fog last throughout the day.  Family has noticed that she has difficulty answering questions and is often confused.    She is following with neurology regarding her gait issues.  Was started on a new medication, but the medicine gave the patient side effects.    She has been monitoring her blood sugars.  Morning blood sugars run 110-138.  She has not had any low blood sugars.  She had stopped taking her metformin for several weeks, but then restarted it.  She was using Tresiba insulin as needed if the blood sugar got above 200.    She has been experiencing dizziness after ambulating.  Feels very off balance.  As soon as she lies down, the symptoms resolved.   is concerned that she may have POTS disease and is requesting testing.    She is using her CPAP machine nightly and sleep well. No longer getting up in the night to urinate. Sleeping more soundly.         Patient Care Team:  Bibiana Dupree DO as PCP - General  Rony Ingram MD as Referring Physician (Endocrinology)  Tiff Obrien MD as Referring Physician (Rheumatology)  Noel Carrizales as Consulting Physician (Neurology)  Pramod Lance DO as Referring Physician (Orthopaedic Surgery)  ANTONELLA Ramos-CNP as Nurse Practitioner (Internal Medicine)     Review of Systems   Constitutional:  Negative for chills and fever.   Psychiatric/Behavioral:  Positive for confusion. Negative for  behavioral problems, dysphoric mood and sleep disturbance. The patient is not nervous/anxious.        Objective   Vitals:  /70   Pulse 85   Temp 36.4 °C (97.6 °F)   SpO2 97%       Physical Exam  Constitutional:       General: She is not in acute distress.     Appearance: She is not ill-appearing.   HENT:      Head: Normocephalic and atraumatic.      Nose: No congestion.      Mouth/Throat:      Mouth: Mucous membranes are dry.   Eyes:      Extraocular Movements: Extraocular movements intact.   Cardiovascular:      Rate and Rhythm: Normal rate.   Pulmonary:      Effort: Pulmonary effort is normal.   Musculoskeletal:      Cervical back: Neck supple.      Right lower leg: No edema.      Left lower leg: No edema.   Skin:     General: Skin is warm and dry.   Neurological:      Mental Status: She is alert. Mental status is at baseline.      Cranial Nerves: No cranial nerve deficit.   Psychiatric:         Mood and Affect: Mood normal.         Behavior: Behavior normal.         Assessment & Plan  Routine general medical examination at health care facility  Medicare AWE performed. Vaccines reviewed.        Brain fog  Chronic issue. Possibly related to poorly controlled thyroid disease. Following with functional medicine.        Cognitive changes  MOCA 23/30. Uncontrolled thyroid disease may be contributing. Last brain MRI completed 3/2023. Mother with history of dementia. Will continue to close monitoring and repeat MOCA after thyroid regulated.        Increased ammonia level  Mild increase in ammonia level noted in February. Thought to be secondary to decreased liver ureagenesis from uncontrolled thyroid disorder. Repeat level ordered.   Orders:    Ammonia; Future    Type 2 diabetes mellitus without complication, with long-term current use of insulin  Last HgbA1c 6.7% in February. Repeat HgbA1c ordered.  Discussed that Tresiba should not be used as an as needed insulin and should be discontinued at this time.   Continue current dose of metformin.  Orders:    metFORMIN XR (Glucophage-XR) 500 mg 24 hr tablet; Take 2 tabs PO every morning and 1 tab every evening.    Hemoglobin A1C; Future    Comprehensive Metabolic Panel; Future    CBC and Auto Differential; Future    Hypertension associated with diabetes  Controlled.       Benign essential hypertension  Controlled.  Continue losartan.       Hyperlipidemia, unspecified hyperlipidemia type  Recheck lipid panel.       Postablative hypothyroidism  Functional medicine currently managing thyroid disease.  Patient is scheduled to see endocrinology in July.       History of Graves' disease  Scheduled for follow-up with endocrinology in July.       Dizziness   requesting tilt table testing since patient's symptoms often onset while she is walking.  Tilt table test ordered to assess for POTS.  Orders:    Tilt Table; Future    CVID (common variable immunodeficiency)  Following with immunology.       Cervical myelopathy with cervical radiculopathy  Symptoms stable.       Noncompliance with medication regimen  Patient and her  continue to stop and start medications to see if that will improve her symptoms.  Discussed that randomly stopping her medicines is confusing the clinical picture.  Recommend that she take her medications as prescribed and discuss any concerns about the medication with her care team.          Time Based Billing:  - Prep Time on Date of Patient Encounter:  1 minutes  - Time Directly with Patient/Family/Caregiver:   45 minutes  - Documentation Time:   5 minutes    Total Minutes:  51

## 2025-06-13 NOTE — ASSESSMENT & PLAN NOTE
Chronic issue. Possibly related to poorly controlled thyroid disease. Following with functional medicine.

## 2025-06-13 NOTE — PROGRESS NOTES
Subjective   Patient ID: Юлия Ku is a 70 y.o. female who presents for Diabetes (Recheck, review bw), Hyperlipidemia, and Fall (2 falls last week).          Sleeping 9-10 hours with cpap    Mroning 138- highest 138          Review of Systems    Objective   /70   Pulse 85   Temp 36.4 °C (97.6 °F)   SpO2 97%     Physical Exam    Assessment/Plan   {Assess/PlanSmartLinks:[id number]}

## 2025-06-13 NOTE — ASSESSMENT & PLAN NOTE
Last HgbA1c 6.7% in February. Repeat HgbA1c ordered.  Discussed that Tresiba should not be used as an as needed insulin and should be discontinued at this time.  Continue current dose of metformin.  Orders:    metFORMIN XR (Glucophage-XR) 500 mg 24 hr tablet; Take 2 tabs PO every morning and 1 tab every evening.    Hemoglobin A1C; Future    Comprehensive Metabolic Panel; Future    CBC and Auto Differential; Future

## 2025-06-13 NOTE — LETTER
Юлия Ku  1955 6/13/2025    To Whom This May Concern:    My patient, Юлия Ku, is unable to perform Jury Duty due to a physical condition that renders the prospective juror unfit for jury service for the remainder of the jury year.      Sincerely,        Bibiana Dupree, DO

## 2025-06-13 NOTE — ASSESSMENT & PLAN NOTE
Functional medicine currently managing thyroid disease.  Patient is scheduled to see endocrinology in July.

## 2025-06-13 NOTE — ASSESSMENT & PLAN NOTE
requesting tilt table testing since patient's symptoms often onset while she is walking.  Tilt table test ordered to assess for POTS.  Orders:    Tilt Table; Future

## 2025-06-13 NOTE — ASSESSMENT & PLAN NOTE
MOCA 23/30. Uncontrolled thyroid disease may be contributing. Last brain MRI completed 3/2023. Mother with history of dementia. Will continue to close monitoring and repeat MOCA after thyroid regulated.

## 2025-06-16 ENCOUNTER — TELEPHONE (OUTPATIENT)
Dept: CARDIOLOGY | Facility: HOSPITAL | Age: 70
End: 2025-06-16
Payer: MEDICARE

## 2025-06-16 NOTE — TELEPHONE ENCOUNTER
Pt spouse Gary called to schedule tilt table test w/ Dr. Shea. Gary informed me that 7/9 would be a good date to schedule. Informed him that we will not be able to inquire about availability for 7/9 until Wednesday 6/25 d/t anesthesia 2 week block schedule. Informed pt spouse that we will reach out to cardiology on 6/25 and will call with date and time for procedure afterwards - Gary verbalized understanding.    Sent reminder to myself/Lou to inquire w/ cardiology on 6/25 for 7/9.     6/25 - [facility] for pt - Dr. Shea next availability for tilt table Wed 7/16 8:30am (arrive 8am). Requested pt return call to confirm.

## 2025-06-20 ENCOUNTER — APPOINTMENT (OUTPATIENT)
Dept: NEUROLOGY | Facility: CLINIC | Age: 70
End: 2025-06-20
Payer: MEDICARE

## 2025-06-20 VITALS — HEIGHT: 66 IN | BODY MASS INDEX: 25.71 KG/M2 | WEIGHT: 160 LBS

## 2025-06-20 DIAGNOSIS — G20.C ATYPICAL PARKINSONISM: Primary | ICD-10-CM

## 2025-06-20 DIAGNOSIS — G20.C PARKINSONISM, UNSPECIFIED PARKINSONISM TYPE (MULTI): ICD-10-CM

## 2025-06-20 LAB
ALBUMIN SERPL-MCNC: 4.4 G/DL (ref 3.6–5.1)
ALP SERPL-CCNC: 50 U/L (ref 37–153)
ALT SERPL-CCNC: 7 U/L (ref 6–29)
AMMONIA PLAS-SCNC: 57 UMOL/L
ANION GAP SERPL CALCULATED.4IONS-SCNC: 14 MMOL/L (CALC) (ref 7–17)
AST SERPL-CCNC: 13 U/L (ref 10–35)
BASOPHILS # BLD AUTO: 29 CELLS/UL (ref 0–200)
BASOPHILS NFR BLD AUTO: 0.6 %
BILIRUB SERPL-MCNC: 0.5 MG/DL (ref 0.2–1.2)
BUN SERPL-MCNC: 13 MG/DL (ref 7–25)
CALCIUM SERPL-MCNC: 9.4 MG/DL (ref 8.6–10.4)
CHLORIDE SERPL-SCNC: 104 MMOL/L (ref 98–110)
CO2 SERPL-SCNC: 21 MMOL/L (ref 20–32)
CREAT SERPL-MCNC: 0.75 MG/DL (ref 0.5–1.05)
EGFRCR SERPLBLD CKD-EPI 2021: 87 ML/MIN/1.73M2
EOSINOPHIL # BLD AUTO: 139 CELLS/UL (ref 15–500)
EOSINOPHIL NFR BLD AUTO: 2.9 %
ERYTHROCYTE [DISTWIDTH] IN BLOOD BY AUTOMATED COUNT: 11.7 % (ref 11–15)
EST. AVERAGE GLUCOSE BLD GHB EST-MCNC: 140 MG/DL
EST. AVERAGE GLUCOSE BLD GHB EST-SCNC: 7.7 MMOL/L
GLUCOSE SERPL-MCNC: 126 MG/DL (ref 65–99)
HBA1C MFR BLD: 6.5 %
HCT VFR BLD AUTO: 43.9 % (ref 35–45)
HGB BLD-MCNC: 14.6 G/DL (ref 11.7–15.5)
LYMPHOCYTES # BLD AUTO: 1325 CELLS/UL (ref 850–3900)
LYMPHOCYTES NFR BLD AUTO: 27.6 %
MCH RBC QN AUTO: 34.5 PG (ref 27–33)
MCHC RBC AUTO-ENTMCNC: 33.3 G/DL (ref 32–36)
MCV RBC AUTO: 103.8 FL (ref 80–100)
MONOCYTES # BLD AUTO: 331 CELLS/UL (ref 200–950)
MONOCYTES NFR BLD AUTO: 6.9 %
NEUTROPHILS # BLD AUTO: 2976 CELLS/UL (ref 1500–7800)
NEUTROPHILS NFR BLD AUTO: 62 %
PLATELET # BLD AUTO: 302 THOUSAND/UL (ref 140–400)
PMV BLD REES-ECKER: 9.9 FL (ref 7.5–12.5)
POTASSIUM SERPL-SCNC: 4.1 MMOL/L (ref 3.5–5.3)
PROT SERPL-MCNC: 6.5 G/DL (ref 6.1–8.1)
RBC # BLD AUTO: 4.23 MILLION/UL (ref 3.8–5.1)
SODIUM SERPL-SCNC: 139 MMOL/L (ref 135–146)
T4 FREE SERPL-MCNC: 1.9 NG/DL (ref 0.8–1.8)
TSH SERPL-ACNC: 2.03 MIU/L (ref 0.4–4.5)
WBC # BLD AUTO: 4.8 THOUSAND/UL (ref 3.8–10.8)

## 2025-06-20 PROCEDURE — 99212 OFFICE O/P EST SF 10 MIN: CPT | Performed by: PSYCHIATRY & NEUROLOGY

## 2025-06-20 PROCEDURE — G8433 SCR FOR DEP NOT CPT DOC RSN: HCPCS | Performed by: PSYCHIATRY & NEUROLOGY

## 2025-06-20 PROCEDURE — 4010F ACE/ARB THERAPY RXD/TAKEN: CPT | Performed by: PSYCHIATRY & NEUROLOGY

## 2025-06-20 PROCEDURE — 1159F MED LIST DOCD IN RCRD: CPT | Performed by: PSYCHIATRY & NEUROLOGY

## 2025-06-20 PROCEDURE — 1036F TOBACCO NON-USER: CPT | Performed by: PSYCHIATRY & NEUROLOGY

## 2025-06-20 PROCEDURE — 1126F AMNT PAIN NOTED NONE PRSNT: CPT | Performed by: PSYCHIATRY & NEUROLOGY

## 2025-06-20 PROCEDURE — G2211 COMPLEX E/M VISIT ADD ON: HCPCS | Performed by: PSYCHIATRY & NEUROLOGY

## 2025-06-20 PROCEDURE — 1160F RVW MEDS BY RX/DR IN RCRD: CPT | Performed by: PSYCHIATRY & NEUROLOGY

## 2025-06-20 PROCEDURE — 3008F BODY MASS INDEX DOCD: CPT | Performed by: PSYCHIATRY & NEUROLOGY

## 2025-06-20 RX ORDER — CARBIDOPA AND LEVODOPA 25; 100 MG/1; MG/1
TABLET, EXTENDED RELEASE ORAL
Start: 2025-06-20

## 2025-06-20 ASSESSMENT — ENCOUNTER SYMPTOMS
OCCASIONAL FEELINGS OF UNSTEADINESS: 1
LOSS OF SENSATION IN FEET: 0
DEPRESSION: 0

## 2025-06-20 ASSESSMENT — PAIN SCALES - GENERAL: PAINLEVEL_OUTOF10: 0-NO PAIN

## 2025-06-20 NOTE — PROGRESS NOTES
Subjective      Юлия Ku is a 70 y.o. year old female is here for follow up for gait ataxia/festination of gait.     Virtual or Telephone Consent    An interactive audio and video telecommunication system which permits real time communications between the patient (at the originating site) and provider (at the distant site) was utilized to provide this telehealth service.   Verbal consent was requested and obtained from Юлия Ku on this date, 06/20/25 for a telehealth visit and the patient's location was confirmed at the time of the visit.    Parkinson's Disease    Tried Amantadine 100mg daily for a few days and was more tired and did not help at all. She stopped it after a few days.   Her thyroid medication was adjusted and her fogginess is improved.   She has had 2 years of progressive gait issues.  She is falling frequently.   Short term memory , orientation is affected.  Forgetting conversations quickly.   No major head injuries.  She has had adjustments to her diabetic medications as well as her thyroid medications.  No double vision.  No speech changes.   She was seen by neurologists, Dr. Carrizales and Dr. Willard,  at [facility] who diagnosed her with parkinsonism and freezing of gait.  Patient and her  felt that the levodopa has helped her freezing.  She does feel like her freezing is worsening overnight when she wakes up to use the bathroom.  She bikes 30 minutes in the morning.    She has high cholesterol.   Sinemet 25-100mg 1 tabs QID- has helped.  No adverse effects.  No hallucinations.    Has brain fog since COVID 2021.   Patient had a brain MRI in 2023 at [facility].  She has Igg infusions weekly. 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[facility])     Orthostatic hypotension    Abnormal echocardiography    History of Graves' disease    Immunologic deficiency syndrome (Multi)    Impairment of balance    Shuffling gait    Hypogammaglobulinemia (Multi)    Cervical myelopathy with cervical radiculopathy    Polyneuropathy associated with underlying disease    Hypertension associated with diabetes    Chronic sclerosing sialadenitis    Dizziness    Brain fog    SIMBA (obstructive sleep apnea)    Parkinson's disease without dyskinesia, with fluctuating manifestations    Bilateral leg weakness    Abnormal coordination    Weakness    CVID (common variable immunodeficiency)     Past Medical History:   Diagnosis Date    Allergic     Arthritis     Difficulty walking 1/2023    Disease of thyroid gland graves 2008    Dizziness 10/2023    Ear pain 5/2024    Ear problems 0/2022    Fatigue 12/2023    Graves disease 6/2008    Head injury 6/2022    Hypertension 2008    Hypothyroidism 6/23    Movement disorder 6/2022    Personal history of irradiation     Status post radioactive iodine thyroid ablation    Personal history of other diseases of the circulatory system     History of hypertension    Personal history of other endocrine, nutritional and metabolic disease     History of type 2 diabetes mellitus    Personal history of other endocrine, nutritional and metabolic disease     History of Graves' disease    Personal history of other endocrine, nutritional and metabolic disease     History of hyperlipidemia    Personal history of other endocrine, nutritional and metabolic disease     History of hypothyroidism    Right ventricular dysfunction 01/31/2024    Sleep difficulties 2020    Snoring 2023    Type 2 diabetes mellitus 5/2008     Past Surgical History:   Procedure Laterality Date    BREAST BIOPSY  5/2008    CARDIAC CATHETERIZATION      OTHER SURGICAL HISTORY  01/12/2021    No history of surgery     Social History     Tobacco Use    Smoking status: Former     Current packs/day:  0.00     Average packs/day: 0.3 packs/day for 2.0 years (0.5 ttl pk-yrs)     Types: Cigarettes     Start date: 1976     Quit date: 1978     Years since quittin.0    Smokeless tobacco: Never   Substance Use Topics    Alcohol use: Never     family history includes Alzheimer's disease in her mother; Breast cancer in her mother; Cancer in her mother; Dementia in her mother; Heart disease in her maternal grandfather; Hypertension in her father and mother; Kidney disease in her father.    Current Outpatient Medications:     albuterol 90 mcg/actuation inhaler, Inhale 2 puffs every 4 hours if needed for shortness of breath., Disp: 18 g, Rfl: 11    azelastine (Astelin) 137 mcg (0.1 %) nasal spray, Administer 1 spray into each nostril 2 times a day. Use in each nostril as directed, Disp: , Rfl:     carbidopa-levodopa (Sinemet CR)  mg ER tablet, Take 1 tab at bedtime, Disp: 90 tablet, Rfl: 3    carbidopa-levodopa (Sinemet)  mg tablet, Take 1.5 tabs QID, Disp: 360 tablet, Rfl: 3    EPINEPHrine 0.3 mg/0.3 mL injection syringe, Inject 0.3 mL (0.3 mg) into the muscle 1 time if needed., Disp: , Rfl:     fluticasone (Flonase) 50 mcg/actuation nasal spray, Administer 1 spray into each nostril once daily. Shake gently. Before first use, prime pump. After use, clean tip and replace cap., Disp: , Rfl:     fluticasone furoate-vilanteroL (Breo Ellipta) 100-25 mcg/dose inhaler, Inhale 1 puff once daily., Disp: 1 each, Rfl: 3    FreeStyle Srinivas 3 Sensor device, USE AS DIRECTED TO CHECK BLOOD GLUCOSE. CHANGE EVERY 14 DAYS, Disp: , Rfl:     levothyroxine (Synthroid, Levoxyl) 125 mcg tablet, Take 1 tablet (125 mcg) by mouth early in the morning.. Take on an empty stomach at the same time each day, either 30 to 60 minutes prior to breakfast, Disp: 30 tablet, Rfl: 2    losartan (Cozaar) 50 mg tablet, Take 1 tablet (50 mg) by mouth once daily., Disp: 90 tablet, Rfl: 1    metFORMIN XR (Glucophage-XR) 500 mg 24 hr tablet,  "Take 2 tabs PO every morning and 1 tab every evening., Disp: , Rfl:     NON FORMULARY, Glia Plasmalogens, Disp: , Rfl:     omega-3 fatty acids (SUPER OMEGA-3 ORAL), Take by mouth., Disp: , Rfl:     pilocarpine (Salagen) 5 mg tablet, TAKE 1 TABLET(5 MG) BY MOUTH THREE TIMES DAILY, Disp: 270 tablet, Rfl: 1    PreviDent 5000 Dry Mouth 1.1 % dental paste, Apply to teeth once daily., Disp: , Rfl:     cycloSPORINE (Restasis) 0.05 % ophthalmic emulsion, Administer 1 drop into both eyes every 12 hours. (Patient not taking: Reported on 6/20/2025), Disp: , Rfl:   Allergies   Allergen Reactions    Mold Shortness of breath    Codeine Hives and Swelling    Lisinopril Cough     Ht 1.676 m (5' 6\")   Wt 72.6 kg (160 lb)   BMI 25.82 kg/m²   Physical Exam:  alert, oriented. face symmetric. speech is soft, fluent.  appropriate, conversational.   moves all extremities above gravity.          Labs:  CBC:   Lab Results   Component Value Date    WBC 4.8 06/19/2025    HGB 14.6 06/19/2025    HCT 43.9 06/19/2025     06/19/2025     BMP:   Lab Results   Component Value Date     06/19/2025    K 4.1 06/19/2025     06/19/2025    CO2 21 06/19/2025    BUN 13 06/19/2025    CREATININE 0.75 06/19/2025    CALCIUM 9.4 06/19/2025     LFT:   Lab Results   Component Value Date    ALKPHOS 50 06/19/2025    BILITOT 0.5 06/19/2025    PROT 6.5 06/19/2025    ALBUMIN 4.4 06/19/2025    ALT 7 06/19/2025    AST 13 06/19/2025    GGT 12 09/26/2024         Assessment/Plan   Problem List Items Addressed This Visit    None    Try to add Sinemet ER 1 tab in morning.   Possible PSP versus festination of gait.  Sinemet has been helping her.  Trying another formulation of levodopa , ? Crexont.  She is sensitive to medications.     This is a chronic neurologic condition that requires ongoing care and monitoring. This is a complex, serious condition that needs long term care going forward. Between myself and the patient we will be changing direction of " care depending on responses to treatment.   Today we discussed medication options, non medication options for management and various other symptoms that are in relation to this disease.  I will continue to be involved in the care of this patient.     Total time with patient encounter:  18 minutes.

## 2025-06-23 ENCOUNTER — APPOINTMENT (OUTPATIENT)
Dept: INTEGRATIVE MEDICINE | Facility: CLINIC | Age: 70
End: 2025-06-23
Payer: MEDICARE

## 2025-06-26 NOTE — TELEPHONE ENCOUNTER
Please call instructions for: tilt table test with Dr. Shea scheduled 7/16 8:30am (arrive 8am) [facility].

## 2025-07-01 ENCOUNTER — PATIENT MESSAGE (OUTPATIENT)
Dept: INTEGRATIVE MEDICINE | Facility: CLINIC | Age: 70
End: 2025-07-01
Payer: MEDICARE

## 2025-07-01 NOTE — TELEPHONE ENCOUNTER
RN called pt at this time and went over tilt table instructions. Pt verbalized understanding and stated she did not have any questions.

## 2025-07-03 ENCOUNTER — APPOINTMENT (OUTPATIENT)
Facility: CLINIC | Age: 70
End: 2025-07-03
Payer: MEDICARE

## 2025-07-08 ENCOUNTER — APPOINTMENT (OUTPATIENT)
Dept: UROLOGY | Facility: CLINIC | Age: 70
End: 2025-07-08
Payer: MEDICARE

## 2025-07-14 DIAGNOSIS — E03.9 HYPOTHYROIDISM, UNSPECIFIED TYPE: ICD-10-CM

## 2025-07-16 ENCOUNTER — HOSPITAL ENCOUNTER (OUTPATIENT)
Dept: CARDIOLOGY | Facility: HOSPITAL | Age: 70
Discharge: HOME | End: 2025-07-16
Payer: MEDICARE

## 2025-07-16 VITALS — HEART RATE: 96 BPM | DIASTOLIC BLOOD PRESSURE: 78 MMHG | SYSTOLIC BLOOD PRESSURE: 147 MMHG

## 2025-07-16 DIAGNOSIS — R42 DIZZINESS: ICD-10-CM

## 2025-07-16 PROCEDURE — 93660 TILT TABLE EVALUATION: CPT

## 2025-07-30 NOTE — PROGRESS NOTES
"Subjective   Юлия Ku is a 70 y.o. female who I am asked to see in consultation for evaluation of thyroid function. She is accompanied by her  today.    She had COVID-19 in 2020.  She was being managed in the [facility] COVID clinic.  Two years ago, she developed brain fog, dry mouth, and weight loss.    She had a history of Graves' and underwent MCLEAN in [place] in 2008. She developed resultant postablative hypothyroidism in 2009.      Current Regimen  Levothyroxine 125mcg p daoiy     She was previously non adherent with taking pills and thus her  is now doing it for the last 3-4 weeks.      Symptoms related to thyroid disease are as listed below:  Energy levels -  fatigued; worsened by Amantadine; receiving IgG infusions at home every two weeks instead of weekly due to cost   Sleep -  uses CPAP; has awakenings; can get 4-6 hours   Temperature intolerances -  denies   Bowel movements -  every three days   Hair changes -  shedding; improving   Skin changes -  denies   Palpitations - denies   Dysphagia -denies   Dyspnea upon lying supine -  denies  Dysphonia -  denies   Weight changes -  lost 10 pounds    She was previously taking Tresiba 10 units prn.  This prescription was then stopped as it was not deemed to be medically necessary.    She has been unable to get her FreeStyle Srinivas CGMs.        Review of Systems   HENT:          Dry mouth    Respiratory:  Positive for shortness of breath.    Neurological:         Memory loss   Psychiatric/Behavioral:  Positive for confusion.    All other systems reviewed and are negative.      Objective   /84   Pulse 84   Ht 1.676 m (5' 6\")   Wt 71.7 kg (158 lb)   BMI 25.50 kg/m²    Physical Exam  Vitals and nursing note reviewed.   Constitutional:       General: She is not in acute distress.     Appearance: Normal appearance. She is normal weight.   HENT:      Head: Normocephalic and atraumatic.      Nose: Nose normal.      Mouth/Throat:      Mouth: Mucous " membranes are moist.     Eyes:      Extraocular Movements: Extraocular movements intact.     Pulmonary:      Effort: Pulmonary effort is normal.     Musculoskeletal:         General: Normal range of motion.     Neurological:      General: No focal deficit present.      Mental Status: She is alert.     Psychiatric:         Mood and Affect: Mood normal.         Lab Results   Component Value Date    TSH 2.03 06/19/2025    FREET4 1.9 (H) 06/19/2025    T3FREE 4.2 09/26/2024    THYROIDPAB 75 (H) 09/26/2024      Lab Results   Component Value Date    HGBA1C 6.5 (H) 06/19/2025    HGBA1C 6.7 (H) 02/04/2025    HGBA1C 6.5 (H) 09/26/2024     06/19/2025    K 4.1 06/19/2025     06/19/2025    CO2 21 06/19/2025    BUN 13 06/19/2025    CREATININE 0.75 06/19/2025    CALCIUM 9.4 06/19/2025    ALBUMIN 4.4 06/19/2025    PROT 6.5 06/19/2025    BILITOT 0.5 06/19/2025    ALKPHOS 50 06/19/2025    ALT 7 06/19/2025    AST 13 06/19/2025    GLUCOSE 126 (H) 06/19/2025    CHOL 289 (H) 05/12/2025    TRIG 155 (H) 05/12/2025    HDL 58 05/12/2025    [id number] <5.0 01/25/2024        Assessment/Plan   70-year-old female presents for the evaluation of for the management of post ablative hypothyroidism.  Testing was positive for Hashimoto's thyroiditis also.      Postablative hypothyroidism  To continue Levothyroxine 125mcg po daily   Take levothyroxine on an empty stomach with water alone, 30-60 minutes before eating or taking other medications, 4 hours before any calcium or iron supplement  To obtain TFTs      Type 2 diabetes mellitus  To continue Metformin  Counseled that given her most recent hemoglobin A1c, the use of basal insulin is not warranted  To continue the use of the  CGM but this would not be covered as you are no longer on insulin therapy  Counseled that the goal A1C should be 7% or less and thus you are at goal   Counseled glycemic control is warranted to prevent microvascular complications    For follow up in 6 months

## 2025-07-30 NOTE — PATIENT INSTRUCTIONS
Thank you for choosing [facility]  for your health care needs.  If you have any questions, concerns or medical needs, please feel free to contact our office at [phone].    Please ensure you complete your blood work one week before the next scheduled appointment.    To continue Levothyroxine 125mcg po daily   Take levothyroxine on an empty stomach with water alone, 30-60 minutes before eating or taking other medications, 4 hours before any calcium or iron supplement  To continue Metformin  To continue the use of the  CGM but this would not be covered as you are no longer on insulin therapy   For follow up in 6 months

## 2025-07-31 ENCOUNTER — APPOINTMENT (OUTPATIENT)
Dept: ENDOCRINOLOGY | Facility: CLINIC | Age: 70
End: 2025-07-31
Payer: MEDICARE

## 2025-07-31 VITALS
SYSTOLIC BLOOD PRESSURE: 128 MMHG | BODY MASS INDEX: 25.39 KG/M2 | HEIGHT: 66 IN | DIASTOLIC BLOOD PRESSURE: 84 MMHG | HEART RATE: 84 BPM | WEIGHT: 158 LBS

## 2025-07-31 DIAGNOSIS — E89.0 POSTABLATIVE HYPOTHYROIDISM: ICD-10-CM

## 2025-07-31 DIAGNOSIS — E11.69 TYPE 2 DIABETES MELLITUS WITH OTHER SPECIFIED COMPLICATION, WITHOUT LONG-TERM CURRENT USE OF INSULIN: Primary | ICD-10-CM

## 2025-07-31 RX ORDER — BLOOD-GLUCOSE SENSOR
EACH MISCELLANEOUS
Qty: 2 EACH | Refills: 11 | Status: SHIPPED | OUTPATIENT
Start: 2025-07-31

## 2025-07-31 NOTE — LETTER
August 1, 2025     Bibiana Dupree DO  [address]  [address] [address]    Patient: Юлия Ku   YOB: 1955   Date of Visit: 7/31/2025       Dear Dr. Bibiana Dupree DO:    Thank you for referring Юлия Ku to me for evaluation. Below are my notes for this consultation.  If you have questions, please do not hesitate to call me. I look forward to following your patient along with you.       Sincerely,     Alley Swan MD      CC: No Recipients  ______________________________________________________________________________________    Subjective   Юлия Ku is a 70 y.o. female who I am asked to see in consultation for evaluation of thyroid function. She is accompanied by her  today.    She had COVID-19 in 2020.  She was being managed in the [facility] clinic.  Two years ago, she developed brain fog, dry mouth, and weight loss.    She had a history of Graves' and underwent MCLEAN in [place] in 2008. She developed resultant postablative hypothyroidism in 2009.      Current Regimen  Levothyroxine 125mcg p daoiy     She was previously non adherent with taking pills and thus her  is now doing it for the last 3-4 weeks.      Symptoms related to thyroid disease are as listed below:  Energy levels -  fatigued; worsened by Amantadine; receiving IgG infusions at home every two weeks instead of weekly due to cost   Sleep -  uses CPAP; has awakenings; can get 4-6 hours   Temperature intolerances -  denies   Bowel movements -  every three days   Hair changes -  shedding; improving   Skin changes -  denies   Palpitations - denies   Dysphagia -denies   Dyspnea upon lying supine -  denies  Dysphonia -  denies   Weight changes -  lost 10 pounds    She was previously taking Tresiba 10 units prn.  This prescription was then stopped as it was not deemed to be medically necessary.    She has been unable to get her FreeStyle Srinivas CGMs.        Review of  "Systems   HENT:          Dry mouth    Respiratory:  Positive for shortness of breath.    Neurological:         Memory loss   Psychiatric/Behavioral:  Positive for confusion.    All other systems reviewed and are negative.      Objective   /84   Pulse 84   Ht 1.676 m (5' 6\")   Wt 71.7 kg (158 lb)   BMI 25.50 kg/m²    Physical Exam  Vitals and nursing note reviewed.   Constitutional:       General: She is not in acute distress.     Appearance: Normal appearance. She is normal weight.   HENT:      Head: Normocephalic and atraumatic.      Nose: Nose normal.      Mouth/Throat:      Mouth: Mucous membranes are moist.     Eyes:      Extraocular Movements: Extraocular movements intact.     Pulmonary:      Effort: Pulmonary effort is normal.     Musculoskeletal:         General: Normal range of motion.     Neurological:      General: No focal deficit present.      Mental Status: She is alert.     Psychiatric:         Mood and Affect: Mood normal.         Lab Results   Component Value Date    TSH 2.03 06/19/2025    FREET4 1.9 (H) 06/19/2025    T3FREE 4.2 09/26/2024    THYROIDPAB 75 (H) 09/26/2024      Lab Results   Component Value Date    HGBA1C 6.5 (H) 06/19/2025    HGBA1C 6.7 (H) 02/04/2025    HGBA1C 6.5 (H) 09/26/2024     06/19/2025    K 4.1 06/19/2025     06/19/2025    CO2 21 06/19/2025    BUN 13 06/19/2025    CREATININE 0.75 06/19/2025    CALCIUM 9.4 06/19/2025    ALBUMIN 4.4 06/19/2025    PROT 6.5 06/19/2025    BILITOT 0.5 06/19/2025    ALKPHOS 50 06/19/2025    ALT 7 06/19/2025    AST 13 06/19/2025    GLUCOSE 126 (H) 06/19/2025    CHOL 289 (H) 05/12/2025    TRIG 155 (H) 05/12/2025    HDL 58 05/12/2025    [id number] <5.0 01/25/2024        Assessment/Plan   70-year-old female presents for the evaluation of for the management of post ablative hypothyroidism.  Testing was positive for Hashimoto's thyroiditis also.      Postablative hypothyroidism  To continue Levothyroxine 125mcg po daily   Take " levothyroxine on an empty stomach with water alone, 30-60 minutes before eating or taking other medications, 4 hours before any calcium or iron supplement  To obtain TFTs      Type 2 diabetes mellitus  To continue Metformin  Counseled that given her most recent hemoglobin A1c, the use of basal insulin is not warranted  To continue the use of the  CGM but this would not be covered as you are no longer on insulin therapy  Counseled that the goal A1C should be 7% or less and thus you are at goal   Counseled glycemic control is warranted to prevent microvascular complications    For follow up in 6 months

## 2025-08-01 ASSESSMENT — ENCOUNTER SYMPTOMS
SHORTNESS OF BREATH: 1
CONFUSION: 1

## 2025-08-01 NOTE — ASSESSMENT & PLAN NOTE
To continue Metformin  Counseled that given her most recent hemoglobin A1c, the use of basal insulin is not warranted  To continue the use of the  CGM but this would not be covered as you are no longer on insulin therapy  Counseled that the goal A1C should be 7% or less and thus you are at goal   Counseled glycemic control is warranted to prevent microvascular complications    For follow up in 6 months

## 2025-08-01 NOTE — ASSESSMENT & PLAN NOTE
To continue Levothyroxine 125mcg po daily   Take levothyroxine on an empty stomach with water alone, 30-60 minutes before eating or taking other medications, 4 hours before any calcium or iron supplement  To obtain TFTs

## 2025-08-02 LAB
T4 FREE SERPL-MCNC: 2.3 NG/DL (ref 0.8–1.8)
TSH SERPL-ACNC: 0.03 MIU/L (ref 0.4–4.5)

## 2025-08-04 ENCOUNTER — TELEPHONE (OUTPATIENT)
Dept: ENDOCRINOLOGY | Facility: CLINIC | Age: 70
End: 2025-08-04
Payer: MEDICARE

## 2025-08-04 NOTE — TELEPHONE ENCOUNTER
The office  received paperwork from [employer] for CGM supplies. Before the office fills out the paperwork want to confirm this is accurate. Left a voicemail and asked to call the office back.

## 2025-08-05 NOTE — TELEPHONE ENCOUNTER
Spoke with patient  regarding cgm info, he is unable to connect or print patient data, so that we can send to insurance to show proof of hypoglycemia if any to have sensors covered by insurance. Patient  agreed to wait for the chucky sensors to finish and he will then delete maty and reinstall the maty through [place], and the office will send CGM data to insurance once connected to our practice.    (Next appt 2/26/2026)

## 2025-08-08 ENCOUNTER — PATIENT MESSAGE (OUTPATIENT)
Dept: INTEGRATIVE MEDICINE | Facility: CLINIC | Age: 70
End: 2025-08-08
Payer: MEDICARE

## 2025-08-11 ENCOUNTER — RESULTS FOLLOW-UP (OUTPATIENT)
Dept: INTEGRATIVE MEDICINE | Facility: CLINIC | Age: 70
End: 2025-08-11
Payer: MEDICARE

## 2025-08-20 ENCOUNTER — PATIENT MESSAGE (OUTPATIENT)
Dept: PRIMARY CARE | Facility: CLINIC | Age: 70
End: 2025-08-20

## 2025-08-20 ENCOUNTER — TELEMEDICINE (OUTPATIENT)
Dept: PRIMARY CARE | Facility: CLINIC | Age: 70
End: 2025-08-20
Payer: MEDICARE

## 2025-08-20 DIAGNOSIS — R34 DECREASED URINATION: Primary | ICD-10-CM

## 2025-08-20 DIAGNOSIS — N30.00 ACUTE CYSTITIS WITHOUT HEMATURIA: Primary | ICD-10-CM

## 2025-08-20 DIAGNOSIS — R30.0 DYSURIA: Primary | ICD-10-CM

## 2025-08-20 PROCEDURE — 1159F MED LIST DOCD IN RCRD: CPT | Performed by: FAMILY MEDICINE

## 2025-08-20 PROCEDURE — 99214 OFFICE O/P EST MOD 30 MIN: CPT | Performed by: FAMILY MEDICINE

## 2025-08-20 PROCEDURE — G2211 COMPLEX E/M VISIT ADD ON: HCPCS | Performed by: FAMILY MEDICINE

## 2025-08-20 PROCEDURE — 4010F ACE/ARB THERAPY RXD/TAKEN: CPT | Performed by: FAMILY MEDICINE

## 2025-08-20 PROCEDURE — 1160F RVW MEDS BY RX/DR IN RCRD: CPT | Performed by: FAMILY MEDICINE

## 2025-08-20 RX ORDER — SULFAMETHOXAZOLE AND TRIMETHOPRIM 800; 160 MG/1; MG/1
1 TABLET ORAL 2 TIMES DAILY
Qty: 6 TABLET | Refills: 0 | Status: SHIPPED | OUTPATIENT
Start: 2025-08-20 | End: 2025-08-23

## 2025-08-20 ASSESSMENT — ENCOUNTER SYMPTOMS
FEVER: 0
FATIGUE: 1
DYSURIA: 0
FREQUENCY: 1
HEMATURIA: 0
CHILLS: 1

## 2025-08-22 ENCOUNTER — PATIENT MESSAGE (OUTPATIENT)
Dept: PRIMARY CARE | Facility: CLINIC | Age: 70
End: 2025-08-22
Payer: MEDICARE

## 2025-08-22 DIAGNOSIS — R50.9 FEVER, UNSPECIFIED FEVER CAUSE: Primary | ICD-10-CM

## 2025-08-22 LAB
APPEARANCE UR: CLEAR
BACTERIA #/AREA URNS HPF: ABNORMAL /HPF
BACTERIA UR CULT: ABNORMAL
BACTERIA UR CULT: ABNORMAL
BILIRUB UR QL STRIP: NEGATIVE
COLOR UR: ABNORMAL
GLUCOSE UR QL STRIP: NEGATIVE
HGB UR QL STRIP: NEGATIVE
HYALINE CASTS #/AREA URNS LPF: ABNORMAL /LPF
KETONES UR QL STRIP: ABNORMAL
LEUKOCYTE ESTERASE UR QL STRIP: ABNORMAL
NITRITE UR QL STRIP: NEGATIVE
PH UR STRIP: 6.5 [PH] (ref 5–8)
PROT UR QL STRIP: ABNORMAL
RBC #/AREA URNS HPF: ABNORMAL /HPF
SERVICE CMNT-IMP: ABNORMAL
SP GR UR STRIP: 1.03 (ref 1–1.03)
SQUAMOUS #/AREA URNS HPF: ABNORMAL /HPF
WBC #/AREA URNS HPF: ABNORMAL /HPF

## 2025-08-25 DIAGNOSIS — E89.0 POSTABLATIVE HYPOTHYROIDISM: Primary | ICD-10-CM

## 2025-08-25 RX ORDER — LEVOTHYROXINE SODIUM 112 UG/1
112 TABLET ORAL DAILY
Qty: 30 TABLET | Refills: 5 | Status: SHIPPED | OUTPATIENT
Start: 2025-08-25 | End: 2026-02-21

## 2025-08-28 ENCOUNTER — APPOINTMENT (OUTPATIENT)
Dept: ALLERGY | Facility: CLINIC | Age: 70
End: 2025-08-28
Payer: MEDICARE

## 2025-09-03 ENCOUNTER — PATIENT MESSAGE (OUTPATIENT)
Dept: INTEGRATIVE MEDICINE | Facility: CLINIC | Age: 70
End: 2025-09-03
Payer: MEDICARE

## 2025-09-04 ENCOUNTER — APPOINTMENT (OUTPATIENT)
Facility: CLINIC | Age: 70
End: 2025-09-04
Payer: MEDICARE

## 2025-09-05 ENCOUNTER — TELEPHONE (OUTPATIENT)
Dept: PRIMARY CARE | Facility: CLINIC | Age: 70
End: 2025-09-05
Payer: MEDICARE

## 2025-09-06 LAB
ALBUMIN SERPL-MCNC: 4.4 G/DL (ref 3.6–5.1)
ALBUMIN/GLOB SERPL: 2.3 (CALC) (ref 1–2.5)
ALP SERPL-CCNC: 46 U/L (ref 37–153)
ALT SERPL-CCNC: 6 U/L (ref 6–29)
AST SERPL-CCNC: 11 U/L (ref 10–35)
BASOPHILS # BLD AUTO: 51 CELLS/UL (ref 0–200)
BASOPHILS NFR BLD AUTO: 1 %
BILIRUB SERPL-MCNC: 0.5 MG/DL (ref 0.2–1.2)
BUN SERPL-MCNC: 18 MG/DL (ref 7–25)
BUN/CREAT SERPL: NORMAL (CALC) (ref 6–22)
CALCIUM SERPL-MCNC: 9.7 MG/DL (ref 8.6–10.4)
CHLORIDE SERPL-SCNC: 105 MMOL/L (ref 98–110)
CHOLEST SERPL-MCNC: 181 MG/DL
CHOLEST/HDLC SERPL: 3.6 (CALC)
CO2 SERPL-SCNC: 26 MMOL/L (ref 20–32)
CREAT SERPL-MCNC: 0.71 MG/DL (ref 0.5–1.05)
EGFRCR SERPLBLD CKD-EPI 2021: 93 ML/MIN/1.73M2
EOSINOPHIL # BLD AUTO: 71 CELLS/UL (ref 15–500)
EOSINOPHIL NFR BLD AUTO: 1.4 %
ERYTHROCYTE [DISTWIDTH] IN BLOOD BY AUTOMATED COUNT: 12.1 % (ref 11–15)
GLOBULIN SER CALC-MCNC: 1.9 G/DL (CALC) (ref 1.9–3.7)
GLUCOSE SERPL-MCNC: 128 MG/DL (ref 65–139)
HCT VFR BLD AUTO: 45.1 % (ref 35–45)
HDLC SERPL-MCNC: 50 MG/DL
HGB BLD-MCNC: 14.9 G/DL (ref 11.7–15.5)
LDLC SERPL CALC-MCNC: 97 MG/DL (CALC)
LYMPHOCYTES # BLD AUTO: 1683 CELLS/UL (ref 850–3900)
LYMPHOCYTES NFR BLD AUTO: 33 %
MCH RBC QN AUTO: 32.5 PG (ref 27–33)
MCHC RBC AUTO-ENTMCNC: 33 G/DL (ref 32–36)
MCV RBC AUTO: 98.5 FL (ref 80–100)
MONOCYTES # BLD AUTO: 500 CELLS/UL (ref 200–950)
MONOCYTES NFR BLD AUTO: 9.8 %
NEUTROPHILS # BLD AUTO: 2795 CELLS/UL (ref 1500–7800)
NEUTROPHILS NFR BLD AUTO: 54.8 %
NONHDLC SERPL-MCNC: 131 MG/DL (CALC)
PLATELET # BLD AUTO: 313 THOUSAND/UL (ref 140–400)
PMV BLD REES-ECKER: 10.1 FL (ref 7.5–12.5)
POTASSIUM SERPL-SCNC: 3.8 MMOL/L (ref 3.5–5.3)
PROT SERPL-MCNC: 6.3 G/DL (ref 6.1–8.1)
RBC # BLD AUTO: 4.58 MILLION/UL (ref 3.8–5.1)
SODIUM SERPL-SCNC: 141 MMOL/L (ref 135–146)
TRIGL SERPL-MCNC: 218 MG/DL
WBC # BLD AUTO: 5.1 THOUSAND/UL (ref 3.8–10.8)

## 2025-09-29 ENCOUNTER — APPOINTMENT (OUTPATIENT)
Facility: CLINIC | Age: 70
End: 2025-09-29
Payer: MEDICARE

## 2026-02-26 ENCOUNTER — APPOINTMENT (OUTPATIENT)
Dept: ENDOCRINOLOGY | Facility: CLINIC | Age: 71
End: 2026-02-26
Payer: MEDICARE